# Patient Record
Sex: FEMALE | Race: WHITE | Employment: FULL TIME | URBAN - NONMETROPOLITAN AREA
[De-identification: names, ages, dates, MRNs, and addresses within clinical notes are randomized per-mention and may not be internally consistent; named-entity substitution may affect disease eponyms.]

---

## 2017-01-04 ENCOUNTER — OFFICE VISIT (OUTPATIENT)
Dept: FAMILY MEDICINE | Facility: CLINIC | Age: 37
End: 2017-01-04
Payer: COMMERCIAL

## 2017-01-04 VITALS
TEMPERATURE: 98.8 F | RESPIRATION RATE: 18 BRPM | SYSTOLIC BLOOD PRESSURE: 120 MMHG | HEIGHT: 60 IN | HEART RATE: 76 BPM | OXYGEN SATURATION: 98 % | BODY MASS INDEX: 39.19 KG/M2 | WEIGHT: 199.6 LBS | DIASTOLIC BLOOD PRESSURE: 70 MMHG

## 2017-01-04 DIAGNOSIS — N89.8 VAGINAL DISCHARGE: ICD-10-CM

## 2017-01-04 DIAGNOSIS — J34.89 NOSE IRRITATION: Primary | ICD-10-CM

## 2017-01-04 LAB
MICRO REPORT STATUS: NORMAL
SPECIMEN SOURCE: NORMAL
WET PREP SPEC: NORMAL

## 2017-01-04 PROCEDURE — 99213 OFFICE O/P EST LOW 20 MIN: CPT | Performed by: NURSE PRACTITIONER

## 2017-01-04 PROCEDURE — 87210 SMEAR WET MOUNT SALINE/INK: CPT | Performed by: NURSE PRACTITIONER

## 2017-01-04 ASSESSMENT — PAIN SCALES - GENERAL: PAINLEVEL: MILD PAIN (2)

## 2017-01-04 NOTE — MR AVS SNAPSHOT
After Visit Summary   1/4/2017    Sebastian Schuster    MRN: [id number]           Patient Information     Date Of Birth          1980        Visit Information        Provider Department      1/4/2017 3:20 PM Kaela Gerardo APRN CNP [facility]        Today's Diagnoses     Nose irritation    -  1     Vaginal discharge           Care Instructions    Increase humidity in your home. Recommend using a humidifier in your bedroom.     Begin using saline nasal spray, which you can buy over the counter, 2 squirts into each nostril 2-4 times a day.    If you don't begin to feel better by the end of the week or if you begin to cough up discolored phlegm or nasal discharge, please call us back.         Follow-ups after your visit        Who to contact     If you have questions or need follow up information about today's clinic visit or your schedule please contact [facility] directly at [phone].  Normal or non-critical lab and imaging results will be communicated to you by WooWhohart, letter or phone within 4 business days after the clinic has received the results. If you do not hear from us within 7 days, please contact the clinic through WooWhohart or phone. If you have a critical or abnormal lab result, we will notify you by phone as soon as possible.  Submit refill requests through BlaBlaCar or call your pharmacy and they will forward the refill request to us. Please allow 3 business days for your refill to be completed.          Additional Information About Your Visit        MyChart Information     BlaBlaCar gives you secure access to your electronic health record. If you see a primary care provider, you can also send messages to your care team and make appointments. If you have questions, please call your primary care clinic.  If you do not have a primary care provider, please call [phone] and they will assist you.        Care EveryWhere ID     This is your Care EveryWhere  "ID. This could be used by other organizations to access your [facility] medical records  [id number]        Your Vitals Were     Pulse Temperature Respirations    76 98.8  F (37.1  C) (Tympanic) 18    Height BMI (Body Mass Index) Pulse Oximetry    5' 0.25\" (1.53 m) 38.68 kg/m2 98%    Last Period Breastfeeding?       12/04/2016 No        Blood Pressure from Last 3 Encounters:   01/04/17 120/70   11/19/15 112/68   10/22/15 114/82    Weight from Last 3 Encounters:   01/04/17 199 lb 9.6 oz (90.538 kg)   11/19/15 192 lb (87.091 kg)   10/22/15 188 lb 12.8 oz (85.639 kg)              We Performed the Following     Wet prep          Today's Medication Changes          These changes are accurate as of: 1/4/17  4:09 PM.  If you have any questions, ask your nurse or doctor.               Stop taking these medicines if you haven't already. Please contact your care team if you have questions.     CLARITIN 10 MG tablet   Generic drug:  loratadine   Stopped by:  Kaela Gerardo APRN CNP           ibuprofen 200 MG tablet   Commonly known as:  ADVIL/MOTRIN   Stopped by:  Kaela Gerardo APRN CNP           metroNIDAZOLE 500 MG tablet   Commonly known as:  FLAGYL   Stopped by:  Kaela Gerardo APRN CNP                    Primary Care Provider Office Phone # Fax #    Karyna Kaiser BOB [place] [phone]       [address] [address]        Thank you!     Thank you for choosing [facility]  for your care. Our goal is always to provide you with excellent care. Hearing back from our patients is one way we can continue to improve our services. Please take a few minutes to complete the written survey that you may receive in the mail after your visit with us. Thank you!             Your Updated Medication List - Protect others around you: Learn how to safely use, store and throw away your medicines at www.disposemymeds.org.          This list is " accurate as of: 1/4/17  4:09 PM.  Always use your most recent med list.                   Brand Name Dispense Instructions for use    desogestrel-ethinyl estradiol 0.15-30 MG-MCG per tablet    APRI    84 tablet    Take 1 tablet by mouth daily

## 2017-01-04 NOTE — PROGRESS NOTES
SUBJECTIVE:                                                    Sebastian Schuster is a 36 year old female who presents to clinic today for the following health issues:      ENT SYMPTOMS      Duration: 2 weeks    Description  nasal congestion, sore throat and right nostril is bleeding (noticed last night)    Severity: mild    Accompanying signs and symptoms: None    History (predisposing factors):  none    Precipitating or alleviating factors: None    Therapies tried and outcome:  Sinus and cold otc, only helped temporary    -stopped taking last week   - Throat is scratchy, raw and feels irritated.   - Lots of nasal discharge. Typically clear watery drainage. Has had occasional thick, discharge and noted bloody mucous yesterday.  - No cough, no sob, no pain, pressure with inspir.   - Has felt well enough to work.  - Has not been around others who are sick. Son had cold but resolved.      Vaginal  SYMPTOMS    Duration: 2-3 days ago  Description  vaginal discharge, clear. Increased about a week ago. Period is due shortly. On OCP, sexually active, one partner  Intensity:  mild  Accompanying signs and symptoms:  Fever/chills: no   Flank pain no   Nausea and vomiting: no   Vaginal symptoms: discharge  Abdominal/Pelvic Pain: no   History  History of frequent UTI's: no   History of kidney stones: no   Sexually Active: YES  Possibility of pregnancy: No  Precipitating or alleviating factors: None  Therapies tried and outcome: none   Outcome: 0          Problem list and histories reviewed & adjusted, as indicated.  Additional history: as documented    Problem list, Medication list, Allergies, and Medical/Social/Surgical histories reviewed in [facility] and updated as appropriate.    ROS:  Constitutional, HEENT, cardiovascular, pulmonary, gi and gu systems are negative, except as otherwise noted.    OBJECTIVE:                                                    /70 mmHg  Pulse 76  Temp(Src) 98.8  F (37.1  C) (Tympanic)  Resp 18   "Ht 5' 0.25\" (1.53 m)  Wt 199 lb 9.6 oz (90.538 kg)  BMI 38.68 kg/m2  SpO2 98%  LMP 12/04/2016  Breastfeeding? No  Body mass index is 38.68 kg/(m^2).  GENERAL: healthy, alert and no distress  EYES: Eyes grossly normal to inspection and conjunctivae and sclerae normal  HENT: bilateral nares are mildly reddened, right nare has 3 small irritated areas which are likely source of bleeding. normal cephalic/atraumatic, ear canals and TM's normal, nose and mouth without ulcers or lesions, rhinorrhea clear, oropharynx clear, oral mucous membranes moist   NECK: no adenopathy, no asymmetry, masses, or scars and thyroid normal to palpation  RESP: lungs clear to auscultation - no rales, rhonchi or wheezes  CV: regular rate and rhythm, normal S1 S2, no S3 or S4, no murmur, click or rub, no peripheral edema and peripheral pulses strong  MS: no gross musculoskeletal defects noted, no edema  PSYCH: mentation appears normal, affect normal/bright    Diagnostic Test Results:  none      ASSESSMENT/PLAN:                                                        1. Nose irritation  Dry nasal mucosa, irritated area in right nares bleeding. No sign of infection.  - Saline nasal spray 2-4 times daily  - Humidify home/ bedroom    2. Vaginal discharge  Description  Consistent with  Normal vaginal discharge as no odor, color or unusual texture/consistency  - Wet prep    Patient Instructions   Increase humidity in your home. Recommend using a humidifier in your bedroom.     Begin using saline nasal spray, which you can buy over the counter, 2 squirts into each nostril 2-4 times a day.    If you don't begin to feel better by the end of the week or if you begin to cough up discolored phlegm or nasal discharge, please call us back.         Kaela Gerardo, NP, APRN [facility]    "

## 2017-01-04 NOTE — PATIENT INSTRUCTIONS
Increase humidity in your home. Recommend using a humidifier in your bedroom.     Begin using saline nasal spray, which you can buy over the counter, 2 squirts into each nostril 2-4 times a day.    If you don't begin to feel better by the end of the week or if you begin to cough up discolored phlegm or nasal discharge, please call us back.

## 2017-01-04 NOTE — NURSING NOTE
"Chief Complaint   Patient presents with     UTI     Throat Problem       Initial /70 mmHg  Pulse 76  Temp(Src) 98.8  F (37.1  C) (Tympanic)  Resp 18  Ht 5' 0.25\" (1.53 m)  Wt 199 lb 9.6 oz (90.538 kg)  BMI 38.68 kg/m2  SpO2 98%  LMP 12/04/2016  Breastfeeding? No Estimated body mass index is 38.68 kg/(m^2) as calculated from the following:    Height as of this encounter: 5' 0.25\" (1.53 m).    Weight as of this encounter: 199 lb 9.6 oz (90.538 kg).  BP completed using cuff size: regular  "

## 2017-01-26 DIAGNOSIS — N92.1 METRORRHAGIA: Primary | ICD-10-CM

## 2017-01-26 RX ORDER — DESOGESTREL AND ETHINYL ESTRADIOL 0.15-0.03
1 KIT ORAL DAILY
Qty: 84 TABLET | Refills: 1 | Status: SHIPPED | OUTPATIENT
Start: 2017-01-26 | End: 2017-07-21

## 2017-01-26 NOTE — TELEPHONE ENCOUNTER
APRI      Last Written Prescription Date: 8/17/16  Last Fill Quantity: 84, # refills: 1  Last Office Visit with G, [facility] or [facility] prescribing provider: 1/4/17       BP Readings from Last 3 Encounters:   01/04/17 120/70   11/19/15 112/68   10/22/15 114/82     Date of last Breast Exam: 8/26/15

## 2017-02-28 ENCOUNTER — OFFICE VISIT (OUTPATIENT)
Dept: FAMILY MEDICINE | Facility: CLINIC | Age: 37
End: 2017-02-28
Payer: COMMERCIAL

## 2017-02-28 VITALS
WEIGHT: 203 LBS | SYSTOLIC BLOOD PRESSURE: 120 MMHG | DIASTOLIC BLOOD PRESSURE: 80 MMHG | HEART RATE: 79 BPM | OXYGEN SATURATION: 97 % | TEMPERATURE: 98.6 F | RESPIRATION RATE: 20 BRPM | BODY MASS INDEX: 39.32 KG/M2

## 2017-02-28 DIAGNOSIS — H65.02 ACUTE SEROUS OTITIS MEDIA OF LEFT EAR, RECURRENCE NOT SPECIFIED: Primary | ICD-10-CM

## 2017-02-28 PROCEDURE — 99213 OFFICE O/P EST LOW 20 MIN: CPT | Performed by: NURSE PRACTITIONER

## 2017-02-28 RX ORDER — FLUTICASONE PROPIONATE 50 MCG
1-2 SPRAY, SUSPENSION (ML) NASAL DAILY
Qty: 16 G | Refills: 0 | Status: SHIPPED | OUTPATIENT
Start: 2017-02-28 | End: 2019-03-14

## 2017-02-28 NOTE — NURSING NOTE
"Chief Complaint   Patient presents with     Otalgia       Initial /80  Pulse 79  Temp 98.6  F (37  C) (Tympanic)  Resp 20  Wt 203 lb (92.1 kg)  SpO2 97%  BMI 39.32 kg/m2 Estimated body mass index is 39.32 kg/(m^2) as calculated from the following:    Height as of 1/4/17: 5' 0.25\" (1.53 m).    Weight as of this encounter: 203 lb (92.1 kg).  Medication Reconciliation: complete    Health Maintenance that is potentially due pending provider review:  NONE    "

## 2017-02-28 NOTE — MR AVS SNAPSHOT
After Visit Summary   2/28/2017    Sebastian Schuster    MRN: [id number]           Patient Information     Date Of Birth          1980        Visit Information        Provider Department      2/28/2017 1:20 PM Cherelle Aaron APRN CNP [facility]        Today's Diagnoses     Acute serous otitis media of left ear, recurrence not specified    -  1      Care Instructions    Use medication as directed.    May use acetaminophen, ibuprofen prn.  Follow up with PCP for recheck in 2 weeks, return sooner if symptoms worsen or change.    Discussed further evaluation by ENT if recurrent otitis media or treatment failure occurs.     Patient voiced understanding of instructions given.        Fluid in the Middle Ear, No Infection (Adult)  Earaches can happen without an infection. This occurs when air and fluid build up behind the eardrum causing a feeling of fullness and discomfort and reduced hearing. This is called otitis media with effusion (OME) or serous otitis media. It means there is fluid in the middle ear. It is not the same as acute otitis media, which is typically from infection.  OME can happen when you have a cold if congestion blocks the passage that drains the middle ear (eustachian tube). It may also occur with nasal allergies or after a bacterial middle ear infection.    The pain/discomfort may come and go. You may hear clicking or popping sounds when you chew or swallow. You may feel that your balance is off. Or you may hear ringing in the ear.  It often takes from several weeks up to 3 months for the fluid to clear on its own. Oral pain relievers and ear drops help if there is pain. Decongestants and antihistamines sometimes help. Antibiotics don't help since there is no infection. Your doctor may prescribe a nasal spray to help reduce swelling in the nose and eustachian tube. This can allow the ear to drain.  If it doesn't improve after 3 months, surgery may be used to drain  the fluid and insert a small tube in the eardrum to allow continued drainage.  Because the middle ear fluid can become infected, it is important to watch for signs of an ear infection which may develop later. These signs include increased ear pain, fever, or drainage from the ear.  Home care  The following guidelines will help you care for yourself at home:    You may use acetaminophen or ibuprofen to control pain, unless another medicine was prescribed. [NOTE: If you have chronic liver or kidney disease or ever had a stomach ulcer or GI bleeding, talk with your doctor before using these medicines.] (Aspirin should never be used in anyone under 18 years of age who is ill with a fever. It may cause severe liver damage.)    While not always helpful, you may use over-the-counter decongestants such as phenylephrine or pseudoephedrine. Don't use nasal spray decongestants more than 3 days. Longer use can make congestion worse. (Prescription nasal sprays from your doctor don't typically have those restrictions.)    Antihistamines may help if you are also having allergy symptoms.    You may use medicines such as guaifenesin to thin mucus and promote drainage.  Follow-up care  Follow up with your doctor or as advised if you are not feeling better after 3 days.  When to seek medical care  Get prompt medical attention if any of the following occur:    Ear pain gets worse or does not start to improve     Fever of 100.4 F (38 C) or higher, or as directed by your health care provider    Fluid or blood draining from the ear    Headache or sinus pain    Stiff neck    Unusual drowsiness or confusion    9462-1121 [employer]. [address] [place] [address], [place] [address]. All rights reserved. This information is not intended as a substitute for professional medical care. Always follow your healthcare professional's instructions.              Follow-ups after your visit        Who to contact     If you have questions or need  follow up information about today's clinic visit or your schedule please contact [facility] directly at [phone].  Normal or non-critical lab and imaging results will be communicated to you by Xamplifiedhart, letter or phone within 4 business days after the clinic has received the results. If you do not hear from us within 7 days, please contact the clinic through Xamplifiedhart or phone. If you have a critical or abnormal lab result, we will notify you by phone as soon as possible.  Submit refill requests through PodPoster or call your pharmacy and they will forward the refill request to us. Please allow 3 business days for your refill to be completed.          Additional Information About Your Visit        XamplifiedharSpotbros Information     PodPoster gives you secure access to your electronic health record. If you see a primary care provider, you can also send messages to your care team and make appointments. If you have questions, please call your primary care clinic.  If you do not have a primary care provider, please call [phone] and they will assist you.        Care EveryWhere ID     This is your Care EveryWhere ID. This could be used by other organizations to access your [facility] medical records  [id number]        Your Vitals Were     Pulse Temperature Respirations Pulse Oximetry BMI (Body Mass Index)       79 98.6  F (37  C) (Tympanic) 20 97% 39.32 kg/m2        Blood Pressure from Last 3 Encounters:   02/28/17 120/80   01/04/17 120/70   11/19/15 112/68    Weight from Last 3 Encounters:   02/28/17 203 lb (92.1 kg)   01/04/17 199 lb 9.6 oz (90.5 kg)   11/19/15 192 lb (87.1 kg)              Today, you had the following     No orders found for display         Today's Medication Changes          These changes are accurate as of: 2/28/17  2:07 PM.  If you have any questions, ask your nurse or doctor.               Start taking these medicines.        Dose/Directions    fluticasone 50 MCG/ACT spray   Commonly  known as:  FLONASE   Used for:  Acute serous otitis media of left ear, recurrence not specified        Dose:  1-2 spray   Spray 1-2 sprays into both nostrils daily   Quantity:  16 g   Refills:  0            Where to get your medicines      [place] [facility] [place] [address] [place] [address]  [address] [place] [address]     Phone:  [phone]     fluticasone 50 MCG/ACT spray                Primary Care Provider Office Phone # Fax #    Karyna Awildadavid [place], [facility] [phone]       [address] [address]        Thank you!     Thank you for choosing [facility]  for your care. Our goal is always to provide you with excellent care. Hearing back from our patients is one way we can continue to improve our services. Please take a few minutes to complete the written survey that you may receive in the mail after your visit with us. Thank you!             Your Updated Medication List - Protect others around you: Learn how to safely use, store and throw away your medicines at www.disposemymeds.org.          This list is accurate as of: 2/28/17  2:07 PM.  Always use your most recent med list.                   Brand Name Dispense Instructions for use    desogestrel-ethinyl estradiol 0.15-30 MG-MCG per tablet    APRI    84 tablet    Take 1 tablet by mouth daily       fluticasone 50 MCG/ACT spray    FLONASE    16 g    Spray 1-2 sprays into both nostrils daily

## 2017-02-28 NOTE — PROGRESS NOTES
SUBJECTIVE:                                                    Sebastian Schuster is a 36 year old female who presents to clinic today for the following health issues:      ENT Symptoms             Symptoms: cc Present Absent Comment   Fever/Chills   x    Fatigue   x    Muscle Aches   x    Eye Irritation   x    Sneezing   x    Nasal Ervin/Drg   x    Sinus Pressure/Pain   x    Loss of smell   x    Dental pain   x    Sore Throat   x    Swollen Glands   x    Ear Pain/Fullness  x  L ear   Cough   x    Wheeze   x    Chest Pain   x    Shortness of breath   x    Rash   x    Other   x      Symptom duration:  couple of days   Symptom severity:  mild   Treatments tried:  none   Contacts:       History of recurrent otitis: no    Past Medical History   Diagnosis Date     Metrorrhagia 4/25/2013       Social History   Substance Use Topics     Smoking status: Never Smoker     Smokeless tobacco: Never Used     Alcohol use No      Comment: rare       REVIEW OF SYSTEMS  ROS:   CONSTITUTIONAL:NEGATIVE for fever, chills, change in weight  INTEGUMENTARY/SKIN: NEGATIVE for worrisome rashes, moles or lesions  EYES: NEGATIVE for vision changes or irritation  ENT/MOUTH: See above  RESP:NEGATIVE for significant cough or SOB  CV: NEGATIVE for chest pain, palpitations or peripheral edema  GI: NEGATIVE for nausea, abdominal pain, heartburn, or change in bowel habits  MUSCULOSKELETAL: NEGATIVE for significant arthralgias or myalgia  NEURO: NEGATIVE for weakness, dizziness or paresthesias        OBJECTIVE:  /80  Pulse 79  Temp 98.6  F (37  C) (Tympanic)  Resp 20  Wt 203 lb (92.1 kg)  SpO2 97%  BMI 39.32 kg/m2   The right TM is normal: no effusions, no erythema, and normal landmarks     The right auditory canal is normal and without drainage, edema or erythema  The left TM is normal: no effusions, no erythema, and normal landmarks and bubbles  The left auditory canal is normal and without drainage, edema or erythema  Oropharynx exam is normal: no  lesions, erythema, adenopathy or exudate.  GENERAL: no acute distress  EYES: EOMI,  PERRL, conjunctiva clear  NECK: supple, non-tender to palpation, no adenopathy noted  RESP: lungs clear to auscultation - no rales, rhonchi or wheezes  SKIN: no suspicious lesions or rashes   CV: regular rates and rhythm, normal    ASSESSMENT:    ICD-10-CM    1. Acute serous otitis media of left ear, recurrence not specified H65.02 fluticasone (FLONASE) 50 MCG/ACT spray         PLAN:  Patient Instructions   Use medication as directed.    May use acetaminophen, ibuprofen prn.  Follow up with PCP for recheck in 2 weeks, return sooner if symptoms worsen or change.    Discussed further evaluation by ENT if recurrent otitis media or treatment failure occurs.     Patient voiced understanding of instructions given.        Fluid in the Middle Ear, No Infection (Adult)  Earaches can happen without an infection. This occurs when air and fluid build up behind the eardrum causing a feeling of fullness and discomfort and reduced hearing. This is called otitis media with effusion (OME) or serous otitis media. It means there is fluid in the middle ear. It is not the same as acute otitis media, which is typically from infection.  OME can happen when you have a cold if congestion blocks the passage that drains the middle ear (eustachian tube). It may also occur with nasal allergies or after a bacterial middle ear infection.    The pain/discomfort may come and go. You may hear clicking or popping sounds when you chew or swallow. You may feel that your balance is off. Or you may hear ringing in the ear.  It often takes from several weeks up to 3 months for the fluid to clear on its own. Oral pain relievers and ear drops help if there is pain. Decongestants and antihistamines sometimes help. Antibiotics don't help since there is no infection. Your doctor may prescribe a nasal spray to help reduce swelling in the nose and eustachian tube. This can allow  the ear to drain.  If it doesn't improve after 3 months, surgery may be used to drain the fluid and insert a small tube in the eardrum to allow continued drainage.  Because the middle ear fluid can become infected, it is important to watch for signs of an ear infection which may develop later. These signs include increased ear pain, fever, or drainage from the ear.  Home care  The following guidelines will help you care for yourself at home:    You may use acetaminophen or ibuprofen to control pain, unless another medicine was prescribed. [NOTE: If you have chronic liver or kidney disease or ever had a stomach ulcer or GI bleeding, talk with your doctor before using these medicines.] (Aspirin should never be used in anyone under 18 years of age who is ill with a fever. It may cause severe liver damage.)    While not always helpful, you may use over-the-counter decongestants such as phenylephrine or pseudoephedrine. Don't use nasal spray decongestants more than 3 days. Longer use can make congestion worse. (Prescription nasal sprays from your doctor don't typically have those restrictions.)    Antihistamines may help if you are also having allergy symptoms.    You may use medicines such as guaifenesin to thin mucus and promote drainage.  Follow-up care  Follow up with your doctor or as advised if you are not feeling better after 3 days.  When to seek medical care  Get prompt medical attention if any of the following occur:    Ear pain gets worse or does not start to improve     Fever of 100.4 F (38 C) or higher, or as directed by your health care provider    Fluid or blood draining from the ear    Headache or sinus pain    Stiff neck    Unusual drowsiness or confusion    [id number] [employer]. [address] [place] [address], [place] [address]. All rights reserved. This information is not intended as a substitute for professional medical care. Always follow your healthcare professional's  instructions.                JOSE MANUEL Cleaning CNP

## 2017-02-28 NOTE — PATIENT INSTRUCTIONS
Use medication as directed.    May use acetaminophen, ibuprofen prn.  Follow up with PCP for recheck in 2 weeks, return sooner if symptoms worsen or change.    Discussed further evaluation by ENT if recurrent otitis media or treatment failure occurs.     Patient voiced understanding of instructions given.        Fluid in the Middle Ear, No Infection (Adult)  Earaches can happen without an infection. This occurs when air and fluid build up behind the eardrum causing a feeling of fullness and discomfort and reduced hearing. This is called otitis media with effusion (OME) or serous otitis media. It means there is fluid in the middle ear. It is not the same as acute otitis media, which is typically from infection.  OME can happen when you have a cold if congestion blocks the passage that drains the middle ear (eustachian tube). It may also occur with nasal allergies or after a bacterial middle ear infection.    The pain/discomfort may come and go. You may hear clicking or popping sounds when you chew or swallow. You may feel that your balance is off. Or you may hear ringing in the ear.  It often takes from several weeks up to 3 months for the fluid to clear on its own. Oral pain relievers and ear drops help if there is pain. Decongestants and antihistamines sometimes help. Antibiotics don't help since there is no infection. Your doctor may prescribe a nasal spray to help reduce swelling in the nose and eustachian tube. This can allow the ear to drain.  If it doesn't improve after 3 months, surgery may be used to drain the fluid and insert a small tube in the eardrum to allow continued drainage.  Because the middle ear fluid can become infected, it is important to watch for signs of an ear infection which may develop later. These signs include increased ear pain, fever, or drainage from the ear.  Home care  The following guidelines will help you care for yourself at home:    You may use acetaminophen or ibuprofen to  control pain, unless another medicine was prescribed. [NOTE: If you have chronic liver or kidney disease or ever had a stomach ulcer or GI bleeding, talk with your doctor before using these medicines.] (Aspirin should never be used in anyone under 18 years of age who is ill with a fever. It may cause severe liver damage.)    While not always helpful, you may use over-the-counter decongestants such as phenylephrine or pseudoephedrine. Don't use nasal spray decongestants more than 3 days. Longer use can make congestion worse. (Prescription nasal sprays from your doctor don't typically have those restrictions.)    Antihistamines may help if you are also having allergy symptoms.    You may use medicines such as guaifenesin to thin mucus and promote drainage.  Follow-up care  Follow up with your doctor or as advised if you are not feeling better after 3 days.  When to seek medical care  Get prompt medical attention if any of the following occur:    Ear pain gets worse or does not start to improve     Fever of 100.4 F (38 C) or higher, or as directed by your health care provider    Fluid or blood draining from the ear    Headache or sinus pain    Stiff neck    Unusual drowsiness or confusion    6604-9881 [employer]. [address] [place] [address], [place] [address]. All rights reserved. This information is not intended as a substitute for professional medical care. Always follow your healthcare professional's instructions.

## 2017-03-09 ENCOUNTER — OFFICE VISIT (OUTPATIENT)
Dept: FAMILY MEDICINE | Facility: CLINIC | Age: 37
End: 2017-03-09
Payer: COMMERCIAL

## 2017-03-09 VITALS
DIASTOLIC BLOOD PRESSURE: 83 MMHG | BODY MASS INDEX: 38.74 KG/M2 | TEMPERATURE: 98.2 F | WEIGHT: 200 LBS | HEART RATE: 70 BPM | SYSTOLIC BLOOD PRESSURE: 121 MMHG

## 2017-03-09 DIAGNOSIS — F41.0 ANXIETY ATTACK: Primary | ICD-10-CM

## 2017-03-09 DIAGNOSIS — R07.89 CHEST PRESSURE: ICD-10-CM

## 2017-03-09 DIAGNOSIS — R63.5 UNEXPLAINED WEIGHT GAIN: ICD-10-CM

## 2017-03-09 LAB — TSH SERPL DL<=0.005 MIU/L-ACNC: 2.28 MU/L (ref 0.4–4)

## 2017-03-09 PROCEDURE — 84443 ASSAY THYROID STIM HORMONE: CPT | Performed by: NURSE PRACTITIONER

## 2017-03-09 PROCEDURE — 99213 OFFICE O/P EST LOW 20 MIN: CPT | Performed by: NURSE PRACTITIONER

## 2017-03-09 PROCEDURE — 93000 ELECTROCARDIOGRAM COMPLETE: CPT | Performed by: NURSE PRACTITIONER

## 2017-03-09 PROCEDURE — 36415 COLL VENOUS BLD VENIPUNCTURE: CPT | Performed by: NURSE PRACTITIONER

## 2017-03-09 RX ORDER — HYDROXYZINE HYDROCHLORIDE 25 MG/1
25 TABLET, FILM COATED ORAL EVERY 6 HOURS PRN
Qty: 40 TABLET | Refills: 0 | Status: SHIPPED | OUTPATIENT
Start: 2017-03-09 | End: 2019-03-14

## 2017-03-09 NOTE — PROGRESS NOTES
SUBJECTIVE:                                                    Sebastian Schuster is a 36 year old female who presents to clinic today for the following health issues:      Abnormal Mood Symptoms     Onset: started this moring     Description:   Depression: YES  Anxiety: YES    Accompanying Signs & Symptoms:  Still participating in activities that you used to enjoy: no  Fatigue: no   Irritability: no   Difficulty concentrating: no  Changes in appetite: no  Problems with sleep: no   Heart racing/beating fast : no  Thoughts of hurting yourself or others: none     History:   Recent stress: YES winter driving   Prior depression hospitalization: None  Family history of depression: no  Family history of anxiety: no      Precipitating factors:   Alcohol/drug use: no    Alleviating factors:  none       Therapies Tried and outcome: None    Has happened a little in the past but nothing like this. Felt like she could not breath and had heaviness in her chest and then it went away. Had a jittery feeling that woke her up about 5:30 this morning. No chest pressure until she got to work. No radiation of pain. No diaphoresis or nausea. No family hx of cardiac disease under age 50. No current chest pain. Feels better.      Problem list and histories reviewed & adjusted, as indicated.  Additional history: as documented    Patient Active Problem List   Diagnosis     Contraceptive management     CARDIOVASCULAR SCREENING; LDL GOAL LESS THAN 160     Adjustment disorder with disturbance of emotion     Obesity (BMI 35.0-39.9 without comorbidity) (H)     Past Surgical History   Procedure Laterality Date     C/section, low transverse  3-     , Low Transverse     C/section, low transverse       Gallbladder surgery       Colonoscopy  2013     Procedure: COLONOSCOPY;  Colonoscopy  ;  Surgeon: Jorge Underwood MD;  Location: [facility]       Social History   Substance Use Topics     Smoking status: Never Smoker      Smokeless tobacco: Never Used     Alcohol use No      Comment: rare     Family History   Problem Relation Age of Onset     CANCER Maternal Grandmother      Circulatory Mother 43     carotid and leg stents         Current Outpatient Prescriptions   Medication Sig Dispense Refill     hydrOXYzine (ATARAX) 25 MG tablet Take 1 tablet (25 mg) by mouth every 6 hours as needed for anxiety 40 tablet 0     fluticasone (FLONASE) 50 MCG/ACT spray Spray 1-2 sprays into both nostrils daily 16 g 0     desogestrel-ethinyl estradiol (APRI) 0.15-30 MG-MCG per tablet Take 1 tablet by mouth daily 84 tablet 1     Allergies   Allergen Reactions     Augmentin Rash     Rash      Penicillins      See augmentin reaction      Lavender Oil Rash     Labs reviewed in EPIC    Reviewed and updated as needed this visit by clinical staff  Allergies  Meds  Problems       Reviewed and updated as needed this visit by Provider  Allergies  Meds  Problems         ROS:  Constitutional, HEENT, cardiovascular, pulmonary, GI, , musculoskeletal, neuro, skin, endocrine and psych systems are negative, except as otherwise noted.    OBJECTIVE:                                                    /83  Pulse 70  Temp 98.2  F (36.8  C) (Tympanic)  Wt 200 lb (90.7 kg)  BMI 38.74 kg/m2  Body mass index is 38.74 kg/(m^2).  GENERAL: healthy, alert and no distress, nontoxic in appearrance   EYES: Eyes grossly normal to inspection, PERRL and conjunctivae and sclerae normal  HENT: ear canals and TM's normal, nose and mouth without ulcers or lesions  NECK: no adenopathy, no asymmetry, masses, or scars and thyroid normal to palpation  RESP: lungs clear to auscultation - no rales, rhonchi or wheezes  CV: regular rate and rhythm, normal S1 S2, no S3 or S4, no murmur, click or rub, no peripheral edema   ABDOMEN: soft, nontender, no hepatosplenomegaly, no masses and bowel sounds normal  MS: no gross musculoskeletal defects noted, no edema  No rash    Diagnostic Test  Results:  No results found for this or any previous visit (from the past 24 hour(s)).     ASSESSMENT/PLAN:                                                      Problem List Items Addressed This Visit     None      Visit Diagnoses     Anxiety attack    -  Primary    Relevant Medications    hydrOXYzine (ATARAX) 25 MG tablet    Other Relevant Orders    MENTAL HEALTH REFERRAL    Chest pressure        Relevant Orders    EKG 12-lead complete w/read - Clinics (Completed)    Unexplained weight gain        Relevant Orders    TSH with free T4 reflex (Completed)               Patient Instructions     Take medication as directed.    Follow up with your primary care provider next week.    Set up counseling appointment to see what could be causing your anxiety.        Indications for emergent return to emergency department discussed with patient, who verbalized good understanding and agreement.  Patient understands the limitations of today's evaluation.         Treating Anxiety Disorders with Therapy  If you have an anxiety disorder, you don t have to suffer anymore. Treatment is available. Therapy (also called counseling) is often a helpful treatment for anxiety disorders. With therapy, a specially trained professional (therapist) helps you face and learn to manage your anxiety. Therapy can be short-term or long-term depending on your needs. In some cases, medication may also be prescribed with therapy. It may take time before you notice how much therapy is helping, but stick with it. With therapy, you can feel better.    Cognitive behavioral therapy (CBT)  Cognitive behavioral therapy (CBT) teaches you to manage anxiety. It does this by helping you understand how you think and act when you re anxious. Research has shown CBT to be a very effective treatment for anxiety disorders. How CBT is run is almost like a class. It involves homework and activities to build skills that teach you to cope with anxiety step by step. It can be  done in a group or one-on-one, and often takes place for a set number of sessions. CBT has two main parts:    Cognitive therapy helps you identify the negative, irrational thoughts that occur with your anxiety. You ll learn to replace these with more positive, realistic thoughts.    Behavioral therapy helps you change how you react to anxiety. You ll learn coping skills and methods for relaxing to help you better deal with anxiety.  Other forms of therapy  Other therapy methods may work better for you than CBT. Or, you may move from CBT to another form of therapy as your treatment needs change. This may mean meeting with a therapist by yourself or in a group. Therapy can also help you work through problems in your life, such as drug or alcohol dependence, that may be making your anxiety worse.  Getting better takes time  Therapy will help you feel better and teach you skills to help manage anxiety long term. But change doesn t happen right away. It takes a commitment from you. And treatment only works if you learn to face the causes of your anxiety. So, you might feel worse before you feel better. This can sometimes make it hard to stick with it. But remember: Therapy is a very effective treatment. The results will be well worth it.  Helping yourself  If anxiety is wearing you down, here are some things you can do to cope:    Don t fight your feelings. Anxiety feeds itself. The more you worry about it, the worse it gets. Instead, try to identify what might have triggered your anxiety. Then try to put this threat in perspective.    Keep in mind that you can t control everything about a situation. Change what you can and let the rest take its course.    Exercise -- it s a great way to relieve tension and help your body feel relaxed.    Examine your life for stress, and try to find ways to reduce it.    Avoid caffeine and nicotine, which can make anxiety symptoms worse.    Fight the temptation to turn to alcohol or  unprescribed drugs for relief. They only make things worse in the long run.     0767-5431 [employer]. [address] [place] [address], [place] [address]. All rights reserved. This information is not intended as a substitute for professional medical care. Always follow your healthcare professional's instructions.        Treating Anxiety Disorders with Medication  An anxiety disorder can make you feel nervous or apprehensive, even without a clear reason. Certain anxiety disorders can cause intense feelings of fear or panic. You may even have physical symptoms, such as a racing heartbeat or dizziness. If you have these feelings, you don t have to suffer anymore. Treatment to help you overcome your fears will likely include therapy (also called counseling). Medication may also be prescribed to help control your symptoms.    Medications  Certain medications may be prescribed to help control your symptoms. As a result, you may feel less anxious. You may also feel able to move forward with therapy. At first, medications and dosages may need to be adjusted to find what works best for you. Try to be patient. Tell your health care provider how a medication makes you feel. This way, you can work together to find the treatment that s best for you. Keep in mind that medications can have side effects. Talk to your provider about any side effects that are bothering you. Changing the dose or type of medication may help. Don t stop taking medication on your own because it can cause symptoms to come back.    Anti-anxiety medication: This medication eases symptoms and helps you relax. Your health care provider will explain when and how to use it. It may be prescribed for use before situations that makes you anxious. Or, you may be told to take it on a regular schedule. Anti-anxiety medication may make you feel a little sleepy or  out of it.  Don t drive a car or operate machinery while on this medication, until you know how it  affects you.  Caution  Never use alcohol or other drugs with anti-anxiety medications. This could result in loss of muscular control, sedation, coma or death. Also, use only the amount of medication prescribed for you. If you think you may have taken too much, get emergency care right away.     Antidepressant medication: This kind of medication is often used to treat anxiety, even if you aren t depressed. An antidepressant helps balance out brain chemicals. This helps keep anxiety under control. This medication is taken on a schedule. It takes a few weeks to start working. If you don t notice a change at first, you may just need more time. But if you don t notice results after the first few weeks, tell your provider.  Keep taking medications as prescribed  Never change your dosage or stop taking your medications without talking to your health care provider first. Keep the following in mind:    Some medications must be taken on a schedule. Make this part of your daily routine. For instance, always take your pill before brushing your teeth. A pillbox can help you remember if you ve taken your medication each day.    Medications are often taken for 6 to 12 months. Your health care provider will then evaluate whether you need to stay on them. Many people who have also had therapy may no longer need medication to manage anxiety.    You may need to stop taking medication slowly to give your body time to adjust. When it s time to stop, your health care provider will tell you more. Remember: Never stop taking your medication without talking to your provider first.    If symptoms return, you may need to start taking medications again. This isn t your fault. It s just the nature of your anxiety disorder.  Special concerns    Side effects: Medications may cause side effects. Ask your health care provider or pharmacist what you can expect. They may have ideas for avoiding some side effects.    Sexual problems: Some  antidepressants can affect your desire for sex or your ability to have an orgasm. A change in dosage or medication often solves the problem. If you have a sexual side effect that concerns you, tell your health care provider.    Addiction: Antidepressants are not addictive. And if you ve never had a problem with drugs or alcohol, you likely won t have a problem with anti-anxiety medication. But if you have history of addiction, you may need to avoid this medication.     5531-4560 [employer]. [address] [place] [address], [place] [address]. All rights reserved. This information is not intended as a substitute for professional medical care. Always follow your healthcare professional's instructions.            JOSE MANUEL Torres [facility]

## 2017-03-09 NOTE — MR AVS SNAPSHOT
After Visit Summary   3/9/2017    Sebastian Schuster    MRN: [id number]           Patient Information     Date Of Birth          1980        Visit Information        Provider Department      3/9/2017 11:20 AM Janett Mix APRN [facility]        Today's Diagnoses     Anxiety attack    -  1    Chest pressure        Unexplained weight gain          Care Instructions    Take medication as directed.    Follow up with your primary care provider next week.    Set up counseling appointment to see what could be causing your anxiety.        Indications for emergent return to emergency department discussed with patient, who verbalized good understanding and agreement.  Patient understands the limitations of today's evaluation.         Treating Anxiety Disorders with Therapy  If you have an anxiety disorder, you don t have to suffer anymore. Treatment is available. Therapy (also called counseling) is often a helpful treatment for anxiety disorders. With therapy, a specially trained professional (therapist) helps you face and learn to manage your anxiety. Therapy can be short-term or long-term depending on your needs. In some cases, medication may also be prescribed with therapy. It may take time before you notice how much therapy is helping, but stick with it. With therapy, you can feel better.    Cognitive behavioral therapy (CBT)  Cognitive behavioral therapy (CBT) teaches you to manage anxiety. It does this by helping you understand how you think and act when you re anxious. Research has shown CBT to be a very effective treatment for anxiety disorders. How CBT is run is almost like a class. It involves homework and activities to build skills that teach you to cope with anxiety step by step. It can be done in a group or one-on-one, and often takes place for a set number of sessions. CBT has two main parts:    Cognitive therapy helps you identify the negative, irrational thoughts  that occur with your anxiety. You ll learn to replace these with more positive, realistic thoughts.    Behavioral therapy helps you change how you react to anxiety. You ll learn coping skills and methods for relaxing to help you better deal with anxiety.  Other forms of therapy  Other therapy methods may work better for you than CBT. Or, you may move from CBT to another form of therapy as your treatment needs change. This may mean meeting with a therapist by yourself or in a group. Therapy can also help you work through problems in your life, such as drug or alcohol dependence, that may be making your anxiety worse.  Getting better takes time  Therapy will help you feel better and teach you skills to help manage anxiety long term. But change doesn t happen right away. It takes a commitment from you. And treatment only works if you learn to face the causes of your anxiety. So, you might feel worse before you feel better. This can sometimes make it hard to stick with it. But remember: Therapy is a very effective treatment. The results will be well worth it.  Helping yourself  If anxiety is wearing you down, here are some things you can do to cope:    Don t fight your feelings. Anxiety feeds itself. The more you worry about it, the worse it gets. Instead, try to identify what might have triggered your anxiety. Then try to put this threat in perspective.    Keep in mind that you can t control everything about a situation. Change what you can and let the rest take its course.    Exercise -- it s a great way to relieve tension and help your body feel relaxed.    Examine your life for stress, and try to find ways to reduce it.    Avoid caffeine and nicotine, which can make anxiety symptoms worse.    Fight the temptation to turn to alcohol or unprescribed drugs for relief. They only make things worse in the long run.     2179-5738 [employer]. [address] [place] [address], [place] [address]. All rights reserved.  This information is not intended as a substitute for professional medical care. Always follow your healthcare professional's instructions.        Treating Anxiety Disorders with Medication  An anxiety disorder can make you feel nervous or apprehensive, even without a clear reason. Certain anxiety disorders can cause intense feelings of fear or panic. You may even have physical symptoms, such as a racing heartbeat or dizziness. If you have these feelings, you don t have to suffer anymore. Treatment to help you overcome your fears will likely include therapy (also called counseling). Medication may also be prescribed to help control your symptoms.    Medications  Certain medications may be prescribed to help control your symptoms. As a result, you may feel less anxious. You may also feel able to move forward with therapy. At first, medications and dosages may need to be adjusted to find what works best for you. Try to be patient. Tell your health care provider how a medication makes you feel. This way, you can work together to find the treatment that s best for you. Keep in mind that medications can have side effects. Talk to your provider about any side effects that are bothering you. Changing the dose or type of medication may help. Don t stop taking medication on your own because it can cause symptoms to come back.    Anti-anxiety medication: This medication eases symptoms and helps you relax. Your health care provider will explain when and how to use it. It may be prescribed for use before situations that makes you anxious. Or, you may be told to take it on a regular schedule. Anti-anxiety medication may make you feel a little sleepy or  out of it.  Don t drive a car or operate machinery while on this medication, until you know how it affects you.  Caution  Never use alcohol or other drugs with anti-anxiety medications. This could result in loss of muscular control, sedation, coma or death. Also, use only the  amount of medication prescribed for you. If you think you may have taken too much, get emergency care right away.     Antidepressant medication: This kind of medication is often used to treat anxiety, even if you aren t depressed. An antidepressant helps balance out brain chemicals. This helps keep anxiety under control. This medication is taken on a schedule. It takes a few weeks to start working. If you don t notice a change at first, you may just need more time. But if you don t notice results after the first few weeks, tell your provider.  Keep taking medications as prescribed  Never change your dosage or stop taking your medications without talking to your health care provider first. Keep the following in mind:    Some medications must be taken on a schedule. Make this part of your daily routine. For instance, always take your pill before brushing your teeth. A pillbox can help you remember if you ve taken your medication each day.    Medications are often taken for 6 to 12 months. Your health care provider will then evaluate whether you need to stay on them. Many people who have also had therapy may no longer need medication to manage anxiety.    You may need to stop taking medication slowly to give your body time to adjust. When it s time to stop, your health care provider will tell you more. Remember: Never stop taking your medication without talking to your provider first.    If symptoms return, you may need to start taking medications again. This isn t your fault. It s just the nature of your anxiety disorder.  Special concerns    Side effects: Medications may cause side effects. Ask your health care provider or pharmacist what you can expect. They may have ideas for avoiding some side effects.    Sexual problems: Some antidepressants can affect your desire for sex or your ability to have an orgasm. A change in dosage or medication often solves the problem. If you have a sexual side effect that concerns  you, tell your health care provider.    Addiction: Antidepressants are not addictive. And if you ve never had a problem with drugs or alcohol, you likely won t have a problem with anti-anxiety medication. But if you have history of addiction, you may need to avoid this medication.     2831-4362 [employer]. [address] [place] [address]. All rights reserved. This information is not intended as a substitute for professional medical care. Always follow your healthcare professional's instructions.              Follow-ups after your visit        Additional Services     MENTAL HEALTH REFERRAL       Your provider has referred you to: Behavioral Healthcare Providers Intake Scheduling [phone]   [email or web address]  [facility]: [facility] [phone]   [email or web address]   *Referral from [facility] Primary Care Provider required - Consultation Model - medication management & future refills will be returned to [facility] PCP upon completion of evaluation  *Please call to schedule an appointment.  [facility]: [facility] [phone]   [email or web address]   *Referral from [facility] Primary Care Provider required - Consultation Model - medication management & future refills will be returned to [facility] PCP upon completion of evaluation  *Please call to schedule an appointment.  [facility]: [facility]  [phone]   [email or web address]   *Referral from [facility] Primary Care Provider required - Consultation Model - medication management & future refills will be returned to [facility] PCP upon completion of evaluation  *Please call to schedule an  appointment.    All scheduling is subject to the client's specific insurance plan & benefits, provider/location availability, and provider clinical specialities.  Please arrive 15 minutes early for your first appointment and bring your completed paperwork.    Please be aware that coverage of these services is subject to the terms and limitations of your health insurance plan.  Call member services at your health plan with any benefit or coverage questions.                  Follow-up notes from your care team     See patient instructions section of the AVS Return in about 1 week (around 3/16/2017) for Follow up with your primary care provider.      Who to contact     If you have questions or need follow up information about today's clinic visit or your schedule please contact [facility] directly at [phone].  Normal or non-critical lab and imaging results will be communicated to you by Nuforce[employer], letter or phone within 4 business days after the clinic has received the results. If you do not hear from us within 7 days, please contact the clinic through [employer]force[employer] or phone. If you have a critical or abnormal lab result, we will notify you by phone as soon as possible.  Submit refill requests through [employer] or call your pharmacy and they will forward the refill request to us. Please allow 3 business days for your refill to be completed.          Additional Information About Your Visit        Data[employer] Information     [employer] gives you secure access to your electronic health record. If you see a primary care provider, you can also send messages to your care team and make appointments. If you have questions, please call your primary care clinic.  If you do not have a primary care provider, please call [phone] and they will assist you.        Care EveryWhere ID     This is your Care EveryWhere ID. This could be used by other organizations to access your [place] medical records  [id number]         Your Vitals Were     Pulse Temperature BMI (Body Mass Index)             70 98.2  F (36.8  C) (Tympanic) 38.74 kg/m2          Blood Pressure from Last 3 Encounters:   03/09/17 121/83   02/28/17 120/80   01/04/17 120/70    Weight from Last 3 Encounters:   03/09/17 200 lb (90.7 kg)   02/28/17 203 lb (92.1 kg)   01/04/17 199 lb 9.6 oz (90.5 kg)              We Performed the Following     EKG 12-lead complete w/read - Clinics     MENTAL HEALTH REFERRAL     TSH with free T4 reflex          Today's Medication Changes          These changes are accurate as of: 3/9/17 12:00 PM.  If you have any questions, ask your nurse or doctor.               Start taking these medicines.        Dose/Directions    hydrOXYzine 25 MG tablet   Commonly known as:  ATARAX   Used for:  Anxiety attack        Dose:  25 mg   Take 1 tablet (25 mg) by mouth every 6 hours as needed for anxiety   Quantity:  40 tablet   Refills:  0            Where to get your medicines      These medications were sent to [facility] [place] [id number]     Phone:  [phone]     hydrOXYzine 25 MG tablet                Primary Care Provider Office Phone # Fax #    Karyna Stockton [facility] [phone]       [address] [address]        Thank you!     Thank you for choosing [facility]  for your care. Our goal is always to provide you with excellent care. Hearing back from our patients is one way we can continue to improve our services. Please take a few minutes to complete the written survey that you may receive in the mail after your visit with us. Thank you!             Your Updated Medication List - Protect others around you: Learn how to safely use, store and throw away your medicines at www.disposemymeds.org.          This list is accurate as of: 3/9/17 12:00 PM.  Always use your most recent med list.                    Brand Name Dispense Instructions for use    desogestrel-ethinyl estradiol 0.15-30 MG-MCG per tablet    APRI    84 tablet    Take 1 tablet by mouth daily       fluticasone 50 MCG/ACT spray    FLONASE    16 g    Spray 1-2 sprays into both nostrils daily       hydrOXYzine 25 MG tablet    ATARAX    40 tablet    Take 1 tablet (25 mg) by mouth every 6 hours as needed for anxiety

## 2017-03-09 NOTE — PATIENT INSTRUCTIONS
Take medication as directed.    Follow up with your primary care provider next week.    Set up counseling appointment to see what could be causing your anxiety.        Indications for emergent return to emergency department discussed with patient, who verbalized good understanding and agreement.  Patient understands the limitations of today's evaluation.         Treating Anxiety Disorders with Therapy  If you have an anxiety disorder, you don t have to suffer anymore. Treatment is available. Therapy (also called counseling) is often a helpful treatment for anxiety disorders. With therapy, a specially trained professional (therapist) helps you face and learn to manage your anxiety. Therapy can be short-term or long-term depending on your needs. In some cases, medication may also be prescribed with therapy. It may take time before you notice how much therapy is helping, but stick with it. With therapy, you can feel better.    Cognitive behavioral therapy (CBT)  Cognitive behavioral therapy (CBT) teaches you to manage anxiety. It does this by helping you understand how you think and act when you re anxious. Research has shown CBT to be a very effective treatment for anxiety disorders. How CBT is run is almost like a class. It involves homework and activities to build skills that teach you to cope with anxiety step by step. It can be done in a group or one-on-one, and often takes place for a set number of sessions. CBT has two main parts:    Cognitive therapy helps you identify the negative, irrational thoughts that occur with your anxiety. You ll learn to replace these with more positive, realistic thoughts.    Behavioral therapy helps you change how you react to anxiety. You ll learn coping skills and methods for relaxing to help you better deal with anxiety.  Other forms of therapy  Other therapy methods may work better for you than CBT. Or, you may move from CBT to another form of therapy as your treatment needs  change. This may mean meeting with a therapist by yourself or in a group. Therapy can also help you work through problems in your life, such as drug or alcohol dependence, that may be making your anxiety worse.  Getting better takes time  Therapy will help you feel better and teach you skills to help manage anxiety long term. But change doesn t happen right away. It takes a commitment from you. And treatment only works if you learn to face the causes of your anxiety. So, you might feel worse before you feel better. This can sometimes make it hard to stick with it. But remember: Therapy is a very effective treatment. The results will be well worth it.  Helping yourself  If anxiety is wearing you down, here are some things you can do to cope:    Don t fight your feelings. Anxiety feeds itself. The more you worry about it, the worse it gets. Instead, try to identify what might have triggered your anxiety. Then try to put this threat in perspective.    Keep in mind that you can t control everything about a situation. Change what you can and let the rest take its course.    Exercise -- it s a great way to relieve tension and help your body feel relaxed.    Examine your life for stress, and try to find ways to reduce it.    Avoid caffeine and nicotine, which can make anxiety symptoms worse.    Fight the temptation to turn to alcohol or unprescribed drugs for relief. They only make things worse in the long run.     9616-6873 [employer]. [address] [place] [address]. All rights reserved. This information is not intended as a substitute for professional medical care. Always follow your healthcare professional's instructions.        Treating Anxiety Disorders with Medication  An anxiety disorder can make you feel nervous or apprehensive, even without a clear reason. Certain anxiety disorders can cause intense feelings of fear or panic. You may even have physical symptoms, such as a racing heartbeat  or dizziness. If you have these feelings, you don t have to suffer anymore. Treatment to help you overcome your fears will likely include therapy (also called counseling). Medication may also be prescribed to help control your symptoms.    Medications  Certain medications may be prescribed to help control your symptoms. As a result, you may feel less anxious. You may also feel able to move forward with therapy. At first, medications and dosages may need to be adjusted to find what works best for you. Try to be patient. Tell your health care provider how a medication makes you feel. This way, you can work together to find the treatment that s best for you. Keep in mind that medications can have side effects. Talk to your provider about any side effects that are bothering you. Changing the dose or type of medication may help. Don t stop taking medication on your own because it can cause symptoms to come back.    Anti-anxiety medication: This medication eases symptoms and helps you relax. Your health care provider will explain when and how to use it. It may be prescribed for use before situations that makes you anxious. Or, you may be told to take it on a regular schedule. Anti-anxiety medication may make you feel a little sleepy or  out of it.  Don t drive a car or operate machinery while on this medication, until you know how it affects you.  Caution  Never use alcohol or other drugs with anti-anxiety medications. This could result in loss of muscular control, sedation, coma or death. Also, use only the amount of medication prescribed for you. If you think you may have taken too much, get emergency care right away.     Antidepressant medication: This kind of medication is often used to treat anxiety, even if you aren t depressed. An antidepressant helps balance out brain chemicals. This helps keep anxiety under control. This medication is taken on a schedule. It takes a few weeks to start working. If you don t notice  a change at first, you may just need more time. But if you don t notice results after the first few weeks, tell your provider.  Keep taking medications as prescribed  Never change your dosage or stop taking your medications without talking to your health care provider first. Keep the following in mind:    Some medications must be taken on a schedule. Make this part of your daily routine. For instance, always take your pill before brushing your teeth. A pillbox can help you remember if you ve taken your medication each day.    Medications are often taken for 6 to 12 months. Your health care provider will then evaluate whether you need to stay on them. Many people who have also had therapy may no longer need medication to manage anxiety.    You may need to stop taking medication slowly to give your body time to adjust. When it s time to stop, your health care provider will tell you more. Remember: Never stop taking your medication without talking to your provider first.    If symptoms return, you may need to start taking medications again. This isn t your fault. It s just the nature of your anxiety disorder.  Special concerns    Side effects: Medications may cause side effects. Ask your health care provider or pharmacist what you can expect. They may have ideas for avoiding some side effects.    Sexual problems: Some antidepressants can affect your desire for sex or your ability to have an orgasm. A change in dosage or medication often solves the problem. If you have a sexual side effect that concerns you, tell your health care provider.    Addiction: Antidepressants are not addictive. And if you ve never had a problem with drugs or alcohol, you likely won t have a problem with anti-anxiety medication. But if you have history of addiction, you may need to avoid this medication.     2857-7262 [employer]. [address] [place] [address], [place] [address]. All rights reserved. This information is not intended as a  substitute for professional medical care. Always follow your healthcare professional's instructions.

## 2017-03-09 NOTE — NURSING NOTE
"Chief Complaint   Patient presents with     Anxiety     panic attack this morning        Initial /83  Pulse 70  Temp 98.2  F (36.8  C) (Tympanic)  Wt 200 lb (90.7 kg)  BMI 38.74 kg/m2 Estimated body mass index is 38.74 kg/(m^2) as calculated from the following:    Height as of 1/4/17: 5' 0.25\" (1.53 m).    Weight as of this encounter: 200 lb (90.7 kg).  Medication Reconciliation: complete    Health Maintenance that is potentially due pending provider review:  NONE    n/a    "

## 2017-04-07 ENCOUNTER — TELEPHONE (OUTPATIENT)
Dept: FAMILY MEDICINE | Facility: CLINIC | Age: 37
End: 2017-04-07

## 2017-04-07 ENCOUNTER — MYC MEDICAL ADVICE (OUTPATIENT)
Dept: FAMILY MEDICINE | Facility: CLINIC | Age: 37
End: 2017-04-07

## 2017-04-07 PROBLEM — F41.0 PANIC DISORDER WITHOUT AGORAPHOBIA: Status: ACTIVE | Noted: 2017-04-07

## 2017-04-07 ASSESSMENT — ANXIETY QUESTIONNAIRES
3. WORRYING TOO MUCH ABOUT DIFFERENT THINGS: MORE THAN HALF THE DAYS
GAD7 TOTAL SCORE: 9
7. FEELING AFRAID AS IF SOMETHING AWFUL MIGHT HAPPEN: 1 = SEVERAL DAYS
GAD7 TOTAL SCORE: 9
5. BEING SO RESTLESS THAT IT IS HARD TO SIT STILL: NOT AT ALL
1. FEELING NERVOUS, ANXIOUS, OR ON EDGE: MORE THAN HALF THE DAYS
6. BECOMING EASILY ANNOYED OR IRRITABLE: SEVERAL DAYS
2. NOT BEING ABLE TO STOP OR CONTROL WORRYING: MORE THAN HALF THE DAYS
7. FEELING AFRAID AS IF SOMETHING AWFUL MIGHT HAPPEN: SEVERAL DAYS

## 2017-04-07 ASSESSMENT — PATIENT HEALTH QUESTIONNAIRE - PHQ9: 5. POOR APPETITE OR OVEREATING: SEVERAL DAYS

## 2017-04-07 NOTE — TELEPHONE ENCOUNTER
I haven't seen this patient since 2015. She has seen Leatha Gerardo and Alex Mix. Read Alex Wilkerson's recent OV notes. She should follow-up with someone. Thanks. DAVIDE Maxwell

## 2017-04-07 NOTE — TELEPHONE ENCOUNTER
Panel Management Review      Patient has the following on her problem list: None   This pt came on the CV suite weekly scorecard as needing Anxiety added to the problem list. This was added per dx 3/2017  Due for Gad7    Composite cancer screening  Chart review shows that this patient is due/due soon for the following None  Summary:    Patient is due/failing the following:   gad7    Action needed:   gad7    Type of outreach:    Sent [employer] message.    Questions for provider review:    None                                                                                                                                    Dai AMBROSE CMA       Chart routed to Care Team .

## 2017-04-08 ASSESSMENT — ANXIETY QUESTIONNAIRES: GAD7 TOTAL SCORE: 9

## 2017-04-11 ENCOUNTER — OFFICE VISIT (OUTPATIENT)
Dept: FAMILY MEDICINE | Facility: CLINIC | Age: 37
End: 2017-04-11
Payer: COMMERCIAL

## 2017-04-11 VITALS
RESPIRATION RATE: 20 BRPM | HEART RATE: 80 BPM | WEIGHT: 203 LBS | TEMPERATURE: 99 F | DIASTOLIC BLOOD PRESSURE: 72 MMHG | BODY MASS INDEX: 39.85 KG/M2 | SYSTOLIC BLOOD PRESSURE: 126 MMHG | HEIGHT: 60 IN

## 2017-04-11 DIAGNOSIS — N89.8 VAGINAL DISCHARGE: Primary | ICD-10-CM

## 2017-04-11 DIAGNOSIS — B36.0 TINEA VERSICOLOR: ICD-10-CM

## 2017-04-11 DIAGNOSIS — B37.31 YEAST INFECTION OF THE VAGINA: ICD-10-CM

## 2017-04-11 DIAGNOSIS — F41.0 PANIC DISORDER WITHOUT AGORAPHOBIA: ICD-10-CM

## 2017-04-11 LAB
MICRO REPORT STATUS: ABNORMAL
SPECIMEN SOURCE: ABNORMAL
WET PREP SPEC: ABNORMAL

## 2017-04-11 PROCEDURE — 87210 SMEAR WET MOUNT SALINE/INK: CPT | Performed by: NURSE PRACTITIONER

## 2017-04-11 PROCEDURE — 99214 OFFICE O/P EST MOD 30 MIN: CPT | Performed by: NURSE PRACTITIONER

## 2017-04-11 RX ORDER — FLUCONAZOLE 150 MG/1
150 TABLET ORAL ONCE
Qty: 2 TABLET | Refills: 0 | Status: SHIPPED | OUTPATIENT
Start: 2017-04-11 | End: 2017-04-11

## 2017-04-11 RX ORDER — KETOCONAZOLE 20 MG/ML
SHAMPOO TOPICAL
Qty: 120 ML | Refills: 1 | Status: SHIPPED | OUTPATIENT
Start: 2017-04-11 | End: 2019-03-14

## 2017-04-11 NOTE — PATIENT INSTRUCTIONS
Results for orders placed or performed in visit on 04/11/17   Wet prep   Result Value Ref Range    Specimen Description Vagina     Wet Prep (A)      No Trichomonas seen  No clue cells seen  Yeast seen      Micro Report Status FINAL 04/11/2017      Kefir 1/4 cup daily for preventative    Tinea Versicolor  This is a rash caused by a fungus in the top layers of the skin. This fungus is normally present in the pores of the skin and causes no symptoms, but when the fungus overgrows it causes a rash. The fungus grows more easily in hot climates and on oily or sweaty skin. It is unknown why some people get this rash and others do not, or why the rash would suddenly appear in someone who has never had it before.  The rash consists of irregular, pale, or tan spots and patches. The rash is usually on the neck, upper back, chest, and shoulders. There may be mild itching, especially if you become overheated, but it doesn't cause other symptoms. Because these spots don't change color with sun exposure like normal skin, the rash may appear as lighter or darker than normal skin.  Since the rash is harmless and usually causes no symptoms, the only reason for treatment is to improve appearance. Follow the advice below to clear the rash. However, it will take several months for normal skin color to return.  Home care  The following guidelines will help you care for yourself at home:    Apply medicated dandruff shampoo (no prescription required) to the rash at night. Then, rinse off in the morning using a bath brush or buffing sponge to scrub the skin and remove top layers of dead skin which contain the fungus. Do this every day for 4 weeks.    As an alternate treatment, you may buy an antifungal cream (miconazole or clotrimazole, which are available without a prescription) and apply this twice a day for 7 days. You should still use a bath brush or buffing sponge to scrub the skin once a day in the shower.    This rash is not  contagious to others. So there is no need to avoid exposure of others at school, , or work.  Prevention  This fungus can recur after treatment. To prevent return of the rash, once a month for the next year, apply medicated dandruff shampoo to the areas where the rash once appeared. Rinse off in the morning and scrub the skin as described above. This is especially important to do in the summer time when the rash is most likely to recur.  Other prevention tips include:    Avoid oily skin products.    Wear loose clothing.    Use sunscreen and protect yourself from sunlight.    Avoid tanning beds.  Follow-up care  Follow up with your doctor or as advised by our staff if the rash fails to improve with the above treatment, or if new symptoms appear.  When to seek medical care  Get prompt medical attention if any of the following occur:    Increasing redness of the rash    Change in appearance of the rash    Fever of 100.4 F (38 C) or higher, or as directed by your health care provider    5781-7685 [employer]. [address] [place] [address]. All rights reserved. This information is not intended as a substitute for professional medical care. Always follow your healthcare professional's instructions.        Vaginal Infection: Yeast (Candidiasis)  Yeast infection occurs when yeast in the vagina increase and start attacking the vaginal tissues. Yeast is a type of fungus. These infections are often caused by a type of yeast called Candida albicans. Other species of yeast can also cause infections. Factors that may make infection more likely include recent antibiotic use, douching, or increased sex. Yeast infections are more common in women who have diabetes, or are obese or pregnant, or have a weak immune system.  Symptoms of yeast infection    Clumpy or thin, white discharge, which may look like cottage cheese    No odor or minimal odor    Severe vaginal itching or burning    Burning with  "urination    Swelling, redness of vulva    Pain during sex  Treating yeast infection  Yeast infection is treated with a vaginal antifungal cream. In some cases, antifungal pills are prescribed instead. During treatment:    Finish all of your medicine, even if your symptoms go away.    Apply the cream before going to bed. Lie flat after applying so that it doesn't drip out.    Do not douche or use tampons.    Don't rely on a diaphragm or condoms, since the cream may weaken them.    Avoid intercourse if advised by your healthcare provider.     Should I treat a yeast infection myself?  Discuss with your healthcare provider whether you should use over-the-counter medicines to treat a yeast infection. Self-treatment may depend on whether:    You've had a yeast infection in the past.    You're at risk for STDs.  Call your healthcare provider if symptoms do not go away or come back after treatment.     3145-6642 [employer]. [address] [place] [address]. All rights reserved. This information is not intended as a substitute for professional medical care. Always follow your healthcare professional's instructions.    Phone apps: mindful meditation    Mental Health Resources:   24hr Crisis Intervention   2-531-439-9980   [facility] (24 hour/7 days counselors) Text \"life\" to 19569   National [place] on Mental Illness            640.373.6810 or 466-007-7262.   [place] Association for Children's Mental Health         658.803.8043.   Alcoholics, Alanon, Narcotics Anonymous           219.578.8065  Suicide Awareness Voices of Education (SAVE)         4- 590-141-SAVE (4294)  National Suicide Prevention Line (www.mentalhealthmn.org)        632-200-CFUA (6545)  Mental Health Consumer/Survivor Network of [place]         119.587.3170 or 695-525-4952  M[place] Association of [place]            826.117.6882 or 428-628-6318  Mental Health referral options    1. [facility]     [phone]  Direct number but is a " part of [facility]/DEC - two locations - includes psychiatrist - if doing counseling and want psychiatrist at [facility] - must change counseling to [facility] as well  2. [facility]        [phone], or [phone]  Helps pt find a doctor who uses their services - takes insurance into consideration and can schedule appointment    3. [facility] child and family services     [place]   [phone]   [place]  [phone]  4. [facility] crisis  [phone]  5. [facility]- Dr. Annie Hernandez  [phone]   [place]   6. [facility] - counseling/coaching  [phone]  [place] based in [place] and [place]  7. [facility]   [phone]  [place] -counseling, psychologist  8. [facility]    [phone]  [facility]  9. [facility]  [phone] or [phone]  [facility]        Psychiatrist in the area  1. [facility]      [phone]  2. [facility] Mental Health  [phone]    Saritha Carmona NP (need to be a [facility] patient or have - sha      can prescribe medications)  3. [facility]     [place]      [phone]    [place]      [phone]  Extension# [id number]   [phone]    JORGE Bal - Not a psychiatrist but can order medications and is a little quicker to get into at times - does not go through [facility]  5. [facility]     [facility] [phone], Fax referral to [phone]    JORGE Hernandez - Schedule through [facility] at number above. Does not need to be connected with a provider in [facility] system. Patients ages 18+      [facility] Resources  Contact us for complete information on available services and eligibility information:   [facility] and Human Services Department   [address], [place] [address]   [phone]   [address] [place] [address]   [phone]   Toll  Free: [phone] web: [email or web address] health and human services

## 2017-04-11 NOTE — NURSING NOTE
"Chief Complaint   Patient presents with     Derm Problem     Vaginal Problem       Initial /72  Pulse 80  Temp 99  F (37.2  C) (Tympanic)  Resp 20  Wt 203 lb (92.1 kg)  LMP 03/28/2017  BMI 39.32 kg/m2 Estimated body mass index is 39.32 kg/(m^2) as calculated from the following:    Height as of 1/4/17: 5' 0.25\" (1.53 m).    Weight as of this encounter: 203 lb (92.1 kg).  Medication Reconciliation: complete  "

## 2017-04-11 NOTE — MR AVS SNAPSHOT
After Visit Summary   4/11/2017    Sebastian Schuster    MRN: [id number]           Patient Information     Date Of Birth          1980        Visit Information        Provider Department      4/11/2017 4:00 PM Karyna Stockton, CNP [facility]        Today's Diagnoses     Vaginal discharge    -  1    Yeast infection of the vagina        Tinea versicolor          Care Instructions    Results for orders placed or performed in visit on 04/11/17   Wet prep   Result Value Ref Range    Specimen Description Vagina     Wet Prep (A)      No Trichomonas seen  No clue cells seen  Yeast seen      Micro Report Status FINAL 04/11/2017      Kefir 1/4 cup daily for preventative    Tinea Versicolor  This is a rash caused by a fungus in the top layers of the skin. This fungus is normally present in the pores of the skin and causes no symptoms, but when the fungus overgrows it causes a rash. The fungus grows more easily in hot climates and on oily or sweaty skin. It is unknown why some people get this rash and others do not, or why the rash would suddenly appear in someone who has never had it before.  The rash consists of irregular, pale, or tan spots and patches. The rash is usually on the neck, upper back, chest, and shoulders. There may be mild itching, especially if you become overheated, but it doesn't cause other symptoms. Because these spots don't change color with sun exposure like normal skin, the rash may appear as lighter or darker than normal skin.  Since the rash is harmless and usually causes no symptoms, the only reason for treatment is to improve appearance. Follow the advice below to clear the rash. However, it will take several months for normal skin color to return.  Home care  The following guidelines will help you care for yourself at home:    Apply medicated dandruff shampoo (no prescription required) to the rash at night. Then, rinse off in the morning using a bath brush or  buffing sponge to scrub the skin and remove top layers of dead skin which contain the fungus. Do this every day for 4 weeks.    As an alternate treatment, you may buy an antifungal cream (miconazole or clotrimazole, which are available without a prescription) and apply this twice a day for 7 days. You should still use a bath brush or buffing sponge to scrub the skin once a day in the shower.    This rash is not contagious to others. So there is no need to avoid exposure of others at school, , or work.  Prevention  This fungus can recur after treatment. To prevent return of the rash, once a month for the next year, apply medicated dandruff shampoo to the areas where the rash once appeared. Rinse off in the morning and scrub the skin as described above. This is especially important to do in the summer time when the rash is most likely to recur.  Other prevention tips include:    Avoid oily skin products.    Wear loose clothing.    Use sunscreen and protect yourself from sunlight.    Avoid tanning beds.  Follow-up care  Follow up with your doctor or as advised by our staff if the rash fails to improve with the above treatment, or if new symptoms appear.  When to seek medical care  Get prompt medical attention if any of the following occur:    Increasing redness of the rash    Change in appearance of the rash    Fever of 100.4 F (38 C) or higher, or as directed by your health care provider    3119-9446 The "[employer]. [address] [place] [address]. All rights reserved. This information is not intended as a substitute for professional medical care. Always follow your healthcare professional's instructions.        Vaginal Infection: Yeast (Candidiasis)  Yeast infection occurs when yeast in the vagina increase and start attacking the vaginal tissues. Yeast is a type of fungus. These infections are often caused by a type of yeast called Candida albicans. Other species of yeast can also cause  "infections. Factors that may make infection more likely include recent antibiotic use, douching, or increased sex. Yeast infections are more common in women who have diabetes, or are obese or pregnant, or have a weak immune system.  Symptoms of yeast infection    Clumpy or thin, white discharge, which may look like cottage cheese    No odor or minimal odor    Severe vaginal itching or burning    Burning with urination    Swelling, redness of vulva    Pain during sex  Treating yeast infection  Yeast infection is treated with a vaginal antifungal cream. In some cases, antifungal pills are prescribed instead. During treatment:    Finish all of your medicine, even if your symptoms go away.    Apply the cream before going to bed. Lie flat after applying so that it doesn't drip out.    Do not douche or use tampons.    Don't rely on a diaphragm or condoms, since the cream may weaken them.    Avoid intercourse if advised by your healthcare provider.     Should I treat a yeast infection myself?  Discuss with your healthcare provider whether you should use over-the-counter medicines to treat a yeast infection. Self-treatment may depend on whether:    You've had a yeast infection in the past.    You're at risk for STDs.  Call your healthcare provider if symptoms do not go away or come back after treatment.     9440-4877 [employer]. [address] [place] [address], [place] [address]. All rights reserved. This information is not intended as a substitute for professional medical care. Always follow your healthcare professional's instructions.    Phone apps: mindful meditation    Mental Health Resources:   24hr Crisis Intervention   6-928-528-6435   [facility] (24 hour/7 days counselors) Text \"life\" to 48809   National [place] on Mental Illness            724.245.2677 or 710-771-6730.   [place] Association for Children's Mental Health         783.992.2837.   Alcoholics, Alanon, Narcotics Anonymous  "          [phone]  Suicide Awareness Voices of Education (SAVE)         1- 888-511-SAVE (9306)  National Suicide Prevention Line (www.mentalhealthmn.org)        042-558-ZVQS (6124)  Mental Health Consumer/Survivor Network of [place]         410.753.3061 or 297-730-7651  M[place] Association of [place]            346.292.5749 or [phone]  Mental Health referral options    1. [facility]'s     [phone]  Direct number but is a part of [facility]/DEC - two locations - includes psychiatrist - if doing counseling and want psychiatrist at [facility] - must change counseling to [facility] as well  2. [facility]/DEC        [phone], or [phone]  Helps pt find a doctor who uses their services - takes insurance into consideration and can schedule appointment    3. [facility] child and family services     [place]   [phone]   [place]  [phone]  4. [facility] crisis  [phone]  5. [facility]- Dr. Annie Hernandez  [phone]   [place]   6. [facility] - counseling/coaching  [phone]  [place] based in [facility]  7. [facility]   [phone]  [place] -counseling, psychologist  8. [facility]    [phone]  [facility]  9. [facility]  [phone] or [phone]  [facility]        Psychiatrist in the area  1. [facility]      [phone]  2. [facility] Mental Health  [phone]    Saritha Carmona NP (need to be a [facility] patient or have - sha      can prescribe medications)  3. [facility]     [place]      [phone]    [place]      [phone]  Extension# 2420[facility] Human Services   [phone]    Radha Lockhart, CNS - Not a psychiatrist but can order medications and is a little quicker to get into at times - does not go through [facility]  5. [place] clinics     [facility] [phone], Fax referral to [phone]    Kaela [place],  CNS - Schedule through [facility] at number above. Does not need to be connected with a provider in [place] system. Patients ages 18+      [facility] Resources  Contact us for complete information on available services and eligibility information:   [facility] and Human Services Department   [address], [place] [address]   [phone]   [address] [place] [address]   [phone]   Toll Free: [phone] web: [email or web address] and human services            Follow-ups after your visit        Who to contact     If you have questions or need follow up information about today's clinic visit or your schedule please contact [facility] directly at [phone].  Normal or non-critical lab and imaging results will be communicated to you by [employer], letter or phone within 4 business days after the clinic has received the results. If you do not hear from us within 7 days, please contact the clinic through [employer] or phone. If you have a critical or abnormal lab result, we will notify you by phone as soon as possible.  Submit refill requests through [employer] or call your pharmacy and they will forward the refill request to us. Please allow 3 business days for your refill to be completed.          Additional Information About Your Visit        [employer] Information     [employer] gives you secure access to your electronic health record. If you see a primary care provider, you can also send messages to your care team and make appointments. If you have questions, please call your primary care clinic.  If you do not have a primary care provider, please call [phone] and they will assist you.        Care EveryWhere ID     This is your Care EveryWhere ID. This could be used by other organizations to access your [place] medical records  [id number]        Your Vitals Were     Pulse Temperature Respirations Height Last Period BMI (Body Mass Index)    80 99  F (37.2  C)  "(Tympanic) 20 5' 0.25\" (1.53 m) 03/28/2017 39.32 kg/m2       Blood Pressure from Last 3 Encounters:   04/11/17 126/72   03/09/17 121/83   02/28/17 120/80    Weight from Last 3 Encounters:   04/11/17 203 lb (92.1 kg)   03/09/17 200 lb (90.7 kg)   02/28/17 203 lb (92.1 kg)              We Performed the Following     Wet prep          Today's Medication Changes          These changes are accurate as of: 4/11/17  4:42 PM.  If you have any questions, ask your nurse or doctor.               Start taking these medicines.        Dose/Directions    fluconazole 150 MG tablet   Commonly known as:  DIFLUCAN   Used for:  Yeast infection of the vagina   Started by:  Karyna Stockton CNP        Dose:  150 mg   Take 1 tablet (150 mg) by mouth once for 1 dose Take second one in 2-3 days   Quantity:  2 tablet   Refills:  0       ketoconazole 2 % shampoo   Commonly known as:  NIZORAL   Used for:  Tinea versicolor   Started by:  Karyna Stockton CNP        Apply to torso rash and leave on for 5 minutes, then wash off.  This can be done 3 days in a row.   Quantity:  120 mL   Refills:  1            Where to get your medicines      These medications were sent to [facility] [address] [place] [address]  [address] [place] [address]     Phone:  [phone]     fluconazole 150 MG tablet    ketoconazole 2 % shampoo                Primary Care Provider    None Specified       No primary provider on file.        Thank you!     Thank you for choosing [facility]  for your care. Our goal is always to provide you with excellent care. Hearing back from our patients is one way we can continue to improve our services. Please take a few minutes to complete the written survey that you may receive in the mail after your visit with us. Thank you!             Your Updated Medication List - Protect others around you: Learn how to safely use, store and throw away your medicines at " www.disposemymeds.org.          This list is accurate as of: 4/11/17  4:42 PM.  Always use your most recent med list.                   Brand Name Dispense Instructions for use    desogestrel-ethinyl estradiol 0.15-30 MG-MCG per tablet    APRI    84 tablet    Take 1 tablet by mouth daily       fluconazole 150 MG tablet    DIFLUCAN    2 tablet    Take 1 tablet (150 mg) by mouth once for 1 dose Take second one in 2-3 days       fluticasone 50 MCG/ACT spray    FLONASE    16 g    Spray 1-2 sprays into both nostrils daily       hydrOXYzine 25 MG tablet    ATARAX    40 tablet    Take 1 tablet (25 mg) by mouth every 6 hours as needed for anxiety       ketoconazole 2 % shampoo    NIZORAL    120 mL    Apply to torso rash and leave on for 5 minutes, then wash off.  This can be done 3 days in a row.

## 2017-04-11 NOTE — PROGRESS NOTES
SUBJECTIVE:                                                    Sebastian Schuster is a 36 year old female who presents to clinic today for the following health issues:      Rash      Duration: 1 week     Description  Location: Neck, shoulders, and back   Itching: no    Intensity:  Intensifies with sweating      Accompanying signs and symptoms: None    History (similar episodes/previous evaluation)  Had the same rash and was given a prescription shampoo/body wash     Precipitating or alleviating factors:  New exposures:  None  Recent travel: no      Therapies tried and outcome: none   Tinea versicolor diagnosed 10/22/2015- Nizoral 2% shampoo apply to dry skin and leave o for 5 mintues before washing off. Do this 3 days in a row    Vaginal Symptoms      Duration: 1 week     Description  vaginal discharge - light yellow and itching    Intensity:  mild    Accompanying signs and symptoms (fever/dysuria/abdominal or back pain): None    History  Sexually active: yes, single partner, contraception - oral contraceptives (combined)  Possibility of pregnancy: No  Recent antibiotic use: no     Precipitating or alleviating factors: None    Therapies tried and outcome: none   Outcome: NA     Anxiety  Noted at the end of the visit that she was recently seen for Anxiety  She was given information for counseling, she has not yet followed up on that   She doesn't really want medications  She did a FIDENCIO score via the phone- about the same  Results for orders placed or performed in visit on 04/11/17   Wet prep   Result Value Ref Range    Specimen Description Vagina     Wet Prep (A)      No Trichomonas seen  No clue cells seen  Yeast seen      Micro Report Status FINAL 04/11/2017      FIDENCIO-7 SCORE 5/1/2012 4/7/2017   Total Score 9 -   Total Score - 9 (mild anxiety)   Total Score - 9       PHQ-9 SCORE 5/26/2009 6/25/2009 5/1/2012   Total Score 0 0 6     HPI:     Patient Active Problem List   Diagnosis     Contraceptive management      "CARDIOVASCULAR SCREENING; LDL GOAL LESS THAN 160     Adjustment disorder with disturbance of emotion     Obesity (BMI 35.0-39.9 without comorbidity) (H)     Panic disorder without agoraphobia       Current Outpatient Prescriptions:      fluconazole (DIFLUCAN) 150 MG tablet, Take 1 tablet (150 mg) by mouth once for 1 dose Take second one in 2-3 days, Disp: 2 tablet, Rfl: 0     ketoconazole (NIZORAL) 2 % shampoo, Apply to torso rash and leave on for 5 minutes, then wash off.  This can be done 3 days in a row., Disp: 120 mL, Rfl: 1     hydrOXYzine (ATARAX) 25 MG tablet, Take 1 tablet (25 mg) by mouth every 6 hours as needed for anxiety, Disp: 40 tablet, Rfl: 0     fluticasone (FLONASE) 50 MCG/ACT spray, Spray 1-2 sprays into both nostrils daily, Disp: 16 g, Rfl: 0     desogestrel-ethinyl estradiol (APRI) 0.15-30 MG-MCG per tablet, Take 1 tablet by mouth daily, Disp: 84 tablet, Rfl: 1  Labs reviewed in EPIC      Reviewed and updated as needed this visit by Provider  Allergies  Meds  Problems      ROS:  Constitutional, HEENT, cardiovascular, pulmonary, gi and gu systems are negative, except as otherwise noted.  INTEGUMENTARY/SKIN: rash  : vaginal discharge  PSYCHIATRIC: history of anxiety    PHYSICAL EXAM:   /72  Pulse 80  Temp 99  F (37.2  C) (Tympanic)  Resp 20  Ht 5' 0.25\" (1.53 m)  Wt 203 lb (92.1 kg)  LMP 03/28/2017  BMI 39.32 kg/m2  Body mass index is 39.32 kg/(m^2).  GENERAL APPEARANCE: healthy, alert and no distress  RESP: lungs clear to auscultation - no rales, rhonchi or wheezes  CV: regular rates and rhythm, normal S1 S2, no S3 or S4 and no murmur, click or rub  SKIN: stuck-on brown spots to left side of neck and bilateral anterior torso  PSYCH: mentation appears normal and affect normal/bright      ASSESSMENT & PLAN:     (N89.8) Vaginal discharge  (primary encounter diagnosis)  Comment: acute  Plan: Wet prep        + Yeast    (B37.3) Yeast infection of the vagina  Comment: acute  Plan: " fluconazole (DIFLUCAN) 150 MG tablet          (B36.0) Tinea versicolor  Comment: acute on chronic  Plan: ketoconazole (NIZORAL) 2 % shampoo          (F41.0) Panic disorder without agoraphobia  Comment: chronic  Plan: schedule counseling    Patient Instructions     Results for orders placed or performed in visit on 04/11/17   Wet prep   Result Value Ref Range    Specimen Description Vagina     Wet Prep (A)      No Trichomonas seen  No clue cells seen  Yeast seen      Micro Report Status FINAL 04/11/2017      Kefir 1/4 cup daily for preventative    Tinea Versicolor  This is a rash caused by a fungus in the top layers of the skin. This fungus is normally present in the pores of the skin and causes no symptoms, but when the fungus overgrows it causes a rash. The fungus grows more easily in hot climates and on oily or sweaty skin. It is unknown why some people get this rash and others do not, or why the rash would suddenly appear in someone who has never had it before.  The rash consists of irregular, pale, or tan spots and patches. The rash is usually on the neck, upper back, chest, and shoulders. There may be mild itching, especially if you become overheated, but it doesn't cause other symptoms. Because these spots don't change color with sun exposure like normal skin, the rash may appear as lighter or darker than normal skin.  Since the rash is harmless and usually causes no symptoms, the only reason for treatment is to improve appearance. Follow the advice below to clear the rash. However, it will take several months for normal skin color to return.  Home care  The following guidelines will help you care for yourself at home:    Apply medicated dandruff shampoo (no prescription required) to the rash at night. Then, rinse off in the morning using a bath brush or buffing sponge to scrub the skin and remove top layers of dead skin which contain the fungus. Do this every day for 4 weeks.    As an alternate treatment, you  may buy an antifungal cream (miconazole or clotrimazole, which are available without a prescription) and apply this twice a day for 7 days. You should still use a bath brush or buffing sponge to scrub the skin once a day in the shower.    This rash is not contagious to others. So there is no need to avoid exposure of others at school, , or work.  Prevention  This fungus can recur after treatment. To prevent return of the rash, once a month for the next year, apply medicated dandruff shampoo to the areas where the rash once appeared. Rinse off in the morning and scrub the skin as described above. This is especially important to do in the summer time when the rash is most likely to recur.  Other prevention tips include:    Avoid oily skin products.    Wear loose clothing.    Use sunscreen and protect yourself from sunlight.    Avoid tanning beds.  Follow-up care  Follow up with your doctor or as advised by our staff if the rash fails to improve with the above treatment, or if new symptoms appear.  When to seek medical care  Get prompt medical attention if any of the following occur:    Increasing redness of the rash    Change in appearance of the rash    Fever of 100.4 F (38 C) or higher, or as directed by your health care provider    [phone] [employer]. [address] [place] [address], [place] [address]. All rights reserved. This information is not intended as a substitute for professional medical care. Always follow your healthcare professional's instructions.        Vaginal Infection: Yeast (Candidiasis)  Yeast infection occurs when yeast in the vagina increase and start attacking the vaginal tissues. Yeast is a type of fungus. These infections are often caused by a type of yeast called Candida albicans. Other species of yeast can also cause infections. Factors that may make infection more likely include recent antibiotic use, douching, or increased sex. Yeast infections are more common in women who  "have diabetes, or are obese or pregnant, or have a weak immune system.  Symptoms of yeast infection    Clumpy or thin, white discharge, which may look like cottage cheese    No odor or minimal odor    Severe vaginal itching or burning    Burning with urination    Swelling, redness of vulva    Pain during sex  Treating yeast infection  Yeast infection is treated with a vaginal antifungal cream. In some cases, antifungal pills are prescribed instead. During treatment:    Finish all of your medicine, even if your symptoms go away.    Apply the cream before going to bed. Lie flat after applying so that it doesn't drip out.    Do not douche or use tampons.    Don't rely on a diaphragm or condoms, since the cream may weaken them.    Avoid intercourse if advised by your healthcare provider.     Should I treat a yeast infection myself?  Discuss with your healthcare provider whether you should use over-the-counter medicines to treat a yeast infection. Self-treatment may depend on whether:    You've had a yeast infection in the past.    You're at risk for STDs.  Call your healthcare provider if symptoms do not go away or come back after treatment.     0577-8447 [employer]. [address] [place] [address]. All rights reserved. This information is not intended as a substitute for professional medical care. Always follow your healthcare professional's instructions.    Phone apps: mindful meditation    Mental Health Resources:   24hr Crisis Intervention   1-417-137-1910   [facility] (24 hour/7 days counselors) Text \"life\" to 22687   National [place] on Mental Illness            787.970.1510 or 119-612-4178.   [place] Association for Children's Mental Health         [phone].   Alcoholics, [place], Narcotics Anonymous           [phone]  Suicide Awareness Voices of Education (SAVE)         8- 665-511-SAVE (8679)  National Suicide Prevention Line (www.mentalhealthmn.org)        861-157-OEMK " (7796)  Mental Health Consumer/Survivor Network of [place]         [phone] or 984-852-8164  Mental Health Association of [place]            848.804.9253 or 579-333-8703  Mental Health referral options    1. [facility]     [phone]  Direct number but is a part of [facility] - two locations - includes psychiatrist - if doing counseling and want psychiatrist at [facility] - must change counseling to [facility] as well  2. [facility]        [phone], or [phone]  Helps pt find a doctor who uses their services - takes insurance into consideration and can schedule appointment    3. [facility] child and family services     [place]   [phone]   [place]  [phone]  4. [facility] crisis  [phone]  5. [facility]- Dr. Annie Hernandez  [phone]   [place]   6. [facility] - counseling/coaching  [phone]  [place] based in [facility]  7. [facility]   [phone]  [place] -counseling, psychologist  8. [facility]    [phone]  [facility]  9. [facility]  [phone] or [phone]  [facility], [facility]        Psychiatrist in the area  1. [facility]      [phone]  2. [facility]  [phone]    Saritha Carmona NP (need to be a [facility] patient or have - sha      can prescribe medications)  3. [facility]     [place]      [phone]    [place]      [phone]  Extension# 2420[facility]   [phone]    JORGE Bal - Not a psychiatrist but can order medications and is a little quicker to get into at times - does not go through [facility]  5. [place] clinics     [facility] [phone], Fax referral to [phone]    JORGE Hernandez - Schedule through [facility] at number above. Does not need to be connected with a provider in [place] system. Patients ages 18+      [facility] Resources  Contact   for complete information on available services and eligibility information:   [facility]   [address], [place] [address]   [phone]   [address] [facility] [address], [place] [address]   [phone]   Toll Free: [phone] web: co.[email or web address]. health and human services        Risks, benefits, side effects and rationale for treatment plan fully discussed with the patient and understanding expressed.    Karyna Stockton, [facility]

## 2017-07-21 DIAGNOSIS — N92.1 METRORRHAGIA: ICD-10-CM

## 2017-07-21 RX ORDER — DESOGESTREL AND ETHINYL ESTRADIOL 0.15-0.03
1 KIT ORAL DAILY
Qty: 84 TABLET | Refills: 0 | Status: SHIPPED | OUTPATIENT
Start: 2017-07-21 | End: 2017-10-12

## 2017-07-21 NOTE — TELEPHONE ENCOUNTER
ENSKYCE 0.15-30 MG-MCG per tablet   Last Written Prescription Date: ?  Last Fill Quantity: ?,  # refills: ?   Last Office Visit with [facility], [facility] or [facility] prescribing provider: 04/11/2017

## 2017-10-12 DIAGNOSIS — N92.1 METRORRHAGIA: ICD-10-CM

## 2017-10-12 RX ORDER — DESOGESTREL AND ETHINYL ESTRADIOL 0.15-0.03
1 KIT ORAL DAILY
Qty: 84 TABLET | Refills: 1 | Status: SHIPPED | OUTPATIENT
Start: 2017-10-12 | End: 2018-03-28

## 2017-10-12 ASSESSMENT — ANXIETY QUESTIONNAIRES: 7. FEELING AFRAID AS IF SOMETHING AWFUL MIGHT HAPPEN: NOT AT ALL

## 2017-10-12 NOTE — TELEPHONE ENCOUNTER
desogestrel-ethinyl estradiol (ENSKYCE) 0.15-30 MG-MCG per tablet      Last Written Prescription Date: 7/21/17  Last Fill Quantity: 84, # refills: 0  Last Office Visit with [facility], P or [facility] prescribing provider: 4/11/17       BP Readings from Last 3 Encounters:   04/11/17 126/72   03/09/17 121/83   02/28/17 120/80     Date of last Breast Exam: unknown

## 2018-03-28 DIAGNOSIS — N92.1 METRORRHAGIA: ICD-10-CM

## 2018-03-28 RX ORDER — DESOGESTREL AND ETHINYL ESTRADIOL 0.15-0.03
KIT ORAL
Qty: 84 TABLET | Refills: 3 | Status: SHIPPED | OUTPATIENT
Start: 2018-03-28 | End: 2019-03-01

## 2018-03-28 NOTE — TELEPHONE ENCOUNTER
"Requested Prescriptions   Pending Prescriptions Disp Refills     ENSKYCE 0.15-30 MG-MCG per tablet [Pharmacy Med Name: ENSKYCE TAB] 84 tablet 1     Sig: TAKE ONE TABLET BY MOUTH ONCE DAILY    Contraceptives Protocol Passed    3/28/2018 12:47 PM       Passed - Patient is not a current smoker if age is 35 or older       Passed - Recent (12 mo) or future (30 days) visit within the authorizing provider's specialty    Patient had office visit in the last 12 months or has a visit in the next 30 days with authorizing provider or within the authorizing provider's specialty.  See \"Patient Info\" tab in inbasket, or \"Choose Columns\" in Meds & Orders section of the refill encounter.           Passed - No active pregnancy on record       Passed - No positive pregnancy test in past 12 months        Last Written Prescription Date:  10/12/17  Last Fill Quantity: 84,  # refills: 3   Last office visit: 4/11/2017 with prescribing provider:     Future Office Visit:      "

## 2018-03-28 NOTE — TELEPHONE ENCOUNTER
Routing refill request to provider for review/approval because:  Patient needs to be seen because:  Medication has not been reviewed since 8-26-15.  Please advise refill.    STUART Shields

## 2018-04-12 ENCOUNTER — TELEPHONE (OUTPATIENT)
Dept: FAMILY MEDICINE | Facility: CLINIC | Age: 38
End: 2018-04-12

## 2018-04-12 ASSESSMENT — ANXIETY QUESTIONNAIRES
1. FEELING NERVOUS, ANXIOUS, OR ON EDGE: SEVERAL DAYS
3. WORRYING TOO MUCH ABOUT DIFFERENT THINGS: MORE THAN HALF THE DAYS
6. BECOMING EASILY ANNOYED OR IRRITABLE: SEVERAL DAYS
7. FEELING AFRAID AS IF SOMETHING AWFUL MIGHT HAPPEN: SEVERAL DAYS
5. BEING SO RESTLESS THAT IT IS HARD TO SIT STILL: NOT AT ALL
2. NOT BEING ABLE TO STOP OR CONTROL WORRYING: MORE THAN HALF THE DAYS
GAD7 TOTAL SCORE: 7
IF YOU CHECKED OFF ANY PROBLEMS ON THIS QUESTIONNAIRE, HOW DIFFICULT HAVE THESE PROBLEMS MADE IT FOR YOU TO DO YOUR WORK, TAKE CARE OF THINGS AT HOME, OR GET ALONG WITH OTHER PEOPLE: NOT DIFFICULT AT ALL

## 2018-04-12 ASSESSMENT — PATIENT HEALTH QUESTIONNAIRE - PHQ9: 5. POOR APPETITE OR OVEREATING: NOT AT ALL

## 2018-04-12 NOTE — TELEPHONE ENCOUNTER
[facility] was due for a FIDENCIO. She called and we did a PHQ-9 and GAD7 over the phone.     Please advise.    Kaley Alston-[place]

## 2018-04-12 NOTE — TELEPHONE ENCOUNTER
PHQ-9 SCORE 6/25/2009 5/1/2012 4/12/2018   Total Score 0 6 -   Total Score - - 6     FIDENCIO-7 SCORE 5/1/2012 4/7/2017 4/12/2018   Total Score 9 - -   Total Score - 9 (mild anxiety) -   Total Score - 9 7       Both are stable. Continue to follow with Counselor.   Karyna Stockton, DAVIDE

## 2018-04-13 ASSESSMENT — ANXIETY QUESTIONNAIRES: GAD7 TOTAL SCORE: 7

## 2018-04-13 ASSESSMENT — PATIENT HEALTH QUESTIONNAIRE - PHQ9: SUM OF ALL RESPONSES TO PHQ QUESTIONS 1-9: 6

## 2018-06-29 ENCOUNTER — OFFICE VISIT (OUTPATIENT)
Dept: FAMILY MEDICINE | Facility: CLINIC | Age: 38
End: 2018-06-29
Payer: COMMERCIAL

## 2018-06-29 VITALS
RESPIRATION RATE: 20 BRPM | HEART RATE: 76 BPM | HEIGHT: 60 IN | TEMPERATURE: 99 F | WEIGHT: 218 LBS | SYSTOLIC BLOOD PRESSURE: 128 MMHG | BODY MASS INDEX: 42.8 KG/M2 | DIASTOLIC BLOOD PRESSURE: 80 MMHG

## 2018-06-29 DIAGNOSIS — R10.32 ABDOMINAL PAIN, LEFT LOWER QUADRANT: Primary | ICD-10-CM

## 2018-06-29 DIAGNOSIS — Z97.5 IUD CONTRACEPTION: ICD-10-CM

## 2018-06-29 LAB
ALBUMIN UR-MCNC: NEGATIVE MG/DL
APPEARANCE UR: CLEAR
BILIRUB UR QL STRIP: NEGATIVE
COLOR UR AUTO: YELLOW
GLUCOSE UR STRIP-MCNC: NEGATIVE MG/DL
HGB UR QL STRIP: NEGATIVE
KETONES UR STRIP-MCNC: NEGATIVE MG/DL
LEUKOCYTE ESTERASE UR QL STRIP: NEGATIVE
NITRATE UR QL: NEGATIVE
PH UR STRIP: 6.5 PH (ref 5–7)
SOURCE: NORMAL
SP GR UR STRIP: <=1.005 (ref 1–1.03)
UROBILINOGEN UR STRIP-ACNC: 0.2 EU/DL (ref 0.2–1)

## 2018-06-29 PROCEDURE — 81003 URINALYSIS AUTO W/O SCOPE: CPT | Performed by: NURSE PRACTITIONER

## 2018-06-29 PROCEDURE — 99214 OFFICE O/P EST MOD 30 MIN: CPT | Performed by: NURSE PRACTITIONER

## 2018-06-29 RX ORDER — IBUPROFEN 800 MG/1
800 TABLET, FILM COATED ORAL EVERY 8 HOURS PRN
Qty: 60 TABLET | Refills: 1 | Status: SHIPPED | OUTPATIENT
Start: 2018-06-29 | End: 2019-03-14

## 2018-06-29 NOTE — PROGRESS NOTES
SUBJECTIVE:   Sebastian Schuster is a 37 year old female who presents to clinic today for the following health issues:      Abdominal Pain      Duration: 3 days (though it is feeling much better right now)    Description (location/character/radiation): Lt lower area (effie of along the groin line)       Associated flank pain: Lt sided back pain     Intensity:  Moderate all the time and severe when pushing to urinate or have a bowel movement     Accompanying signs and symptoms:        Fever/Chills: no        Gas/Bloating: no        Nausea/vomitting: no        Diarrhea: no        Dysuria or Hematuria: no     History (previous similar pain/trauma/previous testing): No    Precipitating or alleviating factors:       Pain worse with eating/BM/urination: see above       Pain relieved by BM: no     Therapies tried and outcome: Tylenol 1000 mg alternating  every 4 hours    LMP:  2018    Regular periods lasting 5 days  Last period more painful  Feeling a little more PMS symptoms over the last 2 months  Some more weight gain, would like to switch birth control  No FMHX of gynecological concerns   X 2   BM regular daily, no change to consistency  History of cholecystectomy        HPI:   PCP:  Karyna Stockton, [facility] [phone]    Patient Active Problem List   Diagnosis     Contraceptive management     CARDIOVASCULAR SCREENING; LDL GOAL LESS THAN 160     Adjustment disorder with disturbance of emotion     Obesity (BMI 35.0-39.9 without comorbidity)     Panic disorder without agoraphobia     Current Outpatient Prescriptions   Medication     ENSKYCE 0.15-30 MG-MCG per tablet     fluticasone (FLONASE) 50 MCG/ACT spray     hydrOXYzine (ATARAX) 25 MG tablet     ibuprofen (ADVIL/MOTRIN) 800 MG tablet     ketoconazole (NIZORAL) 2 % shampoo     No current facility-administered medications for this visit.        There are no preventive care reminders to display for this patient.    Reviewed and updated:  Tobacco   "Allergies  Meds  Med Hx  Surg Hx  Fam Hx  Soc Hx     ROS:  Constitutional, neuro, ENT, endocrine, pulmonary, cardiac, gastrointestinal, genitourinary, musculoskeletal, integument and psychiatric systems are negative, except as otherwise noted.    PHYSICAL EXAM:   /80 (BP Location: Right arm, Patient Position: Sitting, Cuff Size: Adult Large)  Pulse 76  Temp 99  F (37.2  C) (Tympanic)  Resp 20  Ht 5' 0.25\" (1.53 m)  Wt 218 lb (98.9 kg)  LMP 06/21/2018  BMI 42.22 kg/m2  Body mass index is 42.22 kg/(m^2).  GENERAL APPEARANCE: healthy, alert, no distress and over weight  RESP: lungs clear to auscultation - no rales, rhonchi or wheezes  CV: regular rates and rhythm, normal S1 S2, no S3 or S4 and no murmur, click or rub  ABDOMEN: soft, nontender, without hepatosplenomegaly or masses, bowel sounds normal and no organomegaly, neg obturator sign, neg BcBurney's, no point tenderness, pain is not reproducible at visit  MS: extremities normal- no gross deformities noted    hip flexors bilaterally are tested, strong, not tight, no pain to LLQ  PSYCH: mentation appears normal and affect normal/bright    ASSESSMENT & PLAN:   1. Abdominal pain, left lower quadrant  Acute  - *UA reflex to Microscopic and Culture ([facility] (except [place] and [place])  - US Pelvic Complete with Transvaginal; Future  - ibuprofen (ADVIL/MOTRIN) 800 MG tablet; Take 1 tablet (800 mg) by mouth every 8 hours as needed for moderate pain  Dispense: 60 tablet; Refill: 1  - Use Tylenol in between dosing of Ibuprofen  - Push fluids  - If worsening symptoms over the weekend, proceed to ER    Results for orders placed or performed in visit on 06/29/18   *UA reflex to Microscopic and Culture ([facility] (except [place] and [place])   Result Value Ref Range    Color Urine Yellow     Appearance Urine Clear     Glucose Urine Negative NEG^Negative mg/dL    Bilirubin Urine Negative NEG^Negative    Ketones " Urine Negative NEG^Negative mg/dL    Specific Gravity Urine <=1.005 1.003 - 1.035    Blood Urine Negative NEG^Negative    pH Urine 6.5 5.0 - 7.0 pH    Protein Albumin Urine Negative NEG^Negative mg/dL    Urobilinogen Urine 0.2 0.2 - 1.0 EU/dL    Nitrite Urine Negative NEG^Negative    Leukocyte Esterase Urine Negative NEG^Negative    Source Midstream Urine      2. IUD contraception  Interested in new form of contraception  - OB/GYN REFERRAL    Risks, benefits, side effects and rationale for treatment plan fully discussed with the patient and understanding expressed.    Karyna Stockton, [facility]

## 2018-06-29 NOTE — PATIENT INSTRUCTIONS
1. Abdominal pain, left lower quadrant  Acute  - *UA reflex to Microscopic and Culture ([place] [facility] (except [place] and [place])  - US Pelvic Complete with Transvaginal; Future  - ibuprofen (ADVIL/MOTRIN) 800 MG tablet; Take 1 tablet (800 mg) by mouth every 8 hours as needed for moderate pain  Dispense: 60 tablet; Refill: 1  - Use Tylenol in between dosing of Ibuprofen  - Push fluids  - If worsening symptoms over the weekend, proceed to ER    Results for orders placed or performed in visit on 06/29/18   *UA reflex to Microscopic and Culture ([place] [facility] (except [place] and [place])   Result Value Ref Range    Color Urine Yellow     Appearance Urine Clear     Glucose Urine Negative NEG^Negative mg/dL    Bilirubin Urine Negative NEG^Negative    Ketones Urine Negative NEG^Negative mg/dL    Specific Gravity Urine <=1.005 1.003 - 1.035    Blood Urine Negative NEG^Negative    pH Urine 6.5 5.0 - 7.0 pH    Protein Albumin Urine Negative NEG^Negative mg/dL    Urobilinogen Urine 0.2 0.2 - 1.0 EU/dL    Nitrite Urine Negative NEG^Negative    Leukocyte Esterase Urine Negative NEG^Negative    Source Midstream Urine

## 2018-06-29 NOTE — NURSING NOTE
"Chief Complaint   Patient presents with     Abdominal Pain       Initial /80 (BP Location: Right arm, Patient Position: Sitting, Cuff Size: Adult Large)  Pulse 76  Temp 99  F (37.2  C) (Tympanic)  Resp 20  Ht 5' 0.25\" (1.53 m)  Wt 218 lb (98.9 kg)  LMP 06/21/2018  BMI 42.22 kg/m2 Estimated body mass index is 42.22 kg/(m^2) as calculated from the following:    Height as of this encounter: 5' 0.25\" (1.53 m).    Weight as of this encounter: 218 lb (98.9 kg).      Health Maintenance that is potentially due pending provider review:  NONE    n/a    Is there anyone who you would like to be able to receive your results? No  If yes have patient fill out DANE    "

## 2018-06-29 NOTE — MR AVS SNAPSHOT
After Visit Summary   6/29/2018    Sebastian Schuster    MRN: [id number]           Patient Information     Date Of Birth          1980        Visit Information        Provider Department      6/29/2018 3:20 PM Karyna Stockton CNP [facility]        Today's Diagnoses     Abdominal pain, left lower quadrant    -  1    IUD contraception          Care Instructions    1. Abdominal pain, left lower quadrant  Acute  - *UA reflex to Microscopic and Culture ([place] [facility] (except [place] and [place])  - US Pelvic Complete with Transvaginal; Future  - ibuprofen (ADVIL/MOTRIN) 800 MG tablet; Take 1 tablet (800 mg) by mouth every 8 hours as needed for moderate pain  Dispense: 60 tablet; Refill: 1  - Use Tylenol in between dosing of Ibuprofen  - Push fluids  - If worsening symptoms over the weekend, proceed to ER    Results for orders placed or performed in visit on 06/29/18   *UA reflex to Microscopic and Culture ([facility] (except [place] and [place])   Result Value Ref Range    Color Urine Yellow     Appearance Urine Clear     Glucose Urine Negative NEG^Negative mg/dL    Bilirubin Urine Negative NEG^Negative    Ketones Urine Negative NEG^Negative mg/dL    Specific Gravity Urine <=1.005 1.003 - 1.035    Blood Urine Negative NEG^Negative    pH Urine 6.5 5.0 - 7.0 pH    Protein Albumin Urine Negative NEG^Negative mg/dL    Urobilinogen Urine 0.2 0.2 - 1.0 EU/dL    Nitrite Urine Negative NEG^Negative    Leukocyte Esterase Urine Negative NEG^Negative    Source Midstream Urine                Follow-ups after your visit        Additional Services     OB/GYN REFERRAL       Your provider has referred you to:  [facility]: [facility] [phone]   [email or web address]  Dr. Noland in [place] site    Please be aware that coverage of these services is subject to the terms and limitations of your health insurance plan.  Call  member services at your health plan with any benefit or coverage questions.      Please bring the following with you to your appointment:    (1) Any X-Rays, CTs or MRIs which have been performed.  Contact the facility where they were done to arrange for  prior to your scheduled appointment.   (2) List of current medications   (3) This referral request   (4) Any documents/labs given to you for this referral                  Future tests that were ordered for you today     Open Future Orders        Priority Expected Expires Ordered    US Pelvic Complete with Transvaginal Routine  6/29/2019 6/29/2018            Who to contact     If you have questions or need follow up information about today's clinic visit or your schedule please contact [facility] directly at [phone].  Normal or non-critical lab and imaging results will be communicated to you by BizeeBeehart, letter or phone within 4 business days after the clinic has received the results. If you do not hear from us within 7 days, please contact the clinic through BizeeBeehart or phone. If you have a critical or abnormal lab result, we will notify you by phone as soon as possible.  Submit refill requests through [email or web address] or call your pharmacy and they will forward the refill request to us. Please allow 3 business days for your refill to be completed.          Additional Information About Your Visit        [email or web address] Information     [email or web address] gives you secure access to your electronic health record. If you see a primary care provider, you can also send messages to your care team and make appointments. If you have questions, please call your primary care clinic.  If you do not have a primary care provider, please call [phone] and they will assist you.        Care EveryWhere ID     This is your Care EveryWhere ID. This could be used by other organizations to access your [place] medical records  [id number]        Your Vitals Were     Pulse Temperature  "Respirations Height Last Period BMI (Body Mass Index)    76 99  F (37.2  C) (Tympanic) 20 5' 0.25\" (1.53 m) 06/21/2018 42.22 kg/m2       Blood Pressure from Last 3 Encounters:   06/29/18 128/80   04/11/17 126/72   03/09/17 121/83    Weight from Last 3 Encounters:   06/29/18 218 lb (98.9 kg)   04/11/17 203 lb (92.1 kg)   03/09/17 200 lb (90.7 kg)              We Performed the Following     *UA reflex to Microscopic and Culture ([place] [facility] (except [place] and [place])     OB/GYN REFERRAL          Today's Medication Changes          These changes are accurate as of 6/29/18  3:50 PM.  If you have any questions, ask your nurse or doctor.               Start taking these medicines.        Dose/Directions    ibuprofen 800 MG tablet   Commonly known as:  ADVIL/MOTRIN   Used for:  Abdominal pain, left lower quadrant   Started by:  Karyna Stockton CNP        Dose:  800 mg   Take 1 tablet (800 mg) by mouth every 8 hours as needed for moderate pain   Quantity:  60 tablet   Refills:  1            Where to get your medicines      [facility] [address] [place] [address]  [address] [address]     Phone:  [phone]     ibuprofen 800 MG tablet                Primary Care Provider Office Phone # Fax #    Karyna Stockton [phone]       [address] [place] [id number]        Equal Access to Services     [facility] AH: Hadii aad ku hadasho Soraúlali, waaxda luqadaha, qaybta kaalmada adeegyada, gaby rankin. So [facility] [phone].    ATENCIÓN: Si juanla ramona, tiene a south disposición servicios gratuitos de asistencia lingüística. Llame al [phone].    We comply with applicable federal civil rights laws and [place] laws. We do not discriminate on the basis of race, color, national origin, age, disability, sex, sexual orientation, or gender identity.            Thank you!     " Thank you for choosing [facility]  for your care. Our goal is always to provide you with excellent care. Hearing back from our patients is one way we can continue to improve our services. Please take a few minutes to complete the written survey that you may receive in the mail after your visit with us. Thank you!             Your Updated Medication List - Protect others around you: Learn how to safely use, store and throw away your medicines at www.disposemymeds.org.          This list is accurate as of 6/29/18  3:50 PM.  Always use your most recent med list.                   Brand Name Dispense Instructions for use Diagnosis    ENSKYCE 0.15-30 MG-MCG per tablet   Generic drug:  desogestrel-ethinyl estradiol     84 tablet    TAKE ONE TABLET BY MOUTH ONCE DAILY    Metrorrhagia       fluticasone 50 MCG/ACT spray    FLONASE    16 g    Spray 1-2 sprays into both nostrils daily    Acute serous otitis media of left ear, recurrence not specified       hydrOXYzine 25 MG tablet    ATARAX    40 tablet    Take 1 tablet (25 mg) by mouth every 6 hours as needed for anxiety    Anxiety attack       ibuprofen 800 MG tablet    ADVIL/MOTRIN    60 tablet    Take 1 tablet (800 mg) by mouth every 8 hours as needed for moderate pain    Abdominal pain, left lower quadrant       ketoconazole 2 % shampoo    NIZORAL    120 mL    Apply to torso rash and leave on for 5 minutes, then wash off.  This can be done 3 days in a row.    Tinea versicolor

## 2018-07-02 ENCOUNTER — RADIANT APPOINTMENT (OUTPATIENT)
Dept: GENERAL RADIOLOGY | Facility: CLINIC | Age: 38
End: 2018-07-02
Attending: NURSE PRACTITIONER
Payer: COMMERCIAL

## 2018-07-02 DIAGNOSIS — R10.32 ABDOMINAL PAIN, LEFT LOWER QUADRANT: ICD-10-CM

## 2018-07-02 PROCEDURE — 76856 US EXAM PELVIC COMPLETE: CPT

## 2018-07-02 PROCEDURE — 76830 TRANSVAGINAL US NON-OB: CPT

## 2018-07-05 ENCOUNTER — MYC MEDICAL ADVICE (OUTPATIENT)
Dept: FAMILY MEDICINE | Facility: CLINIC | Age: 38
End: 2018-07-05

## 2018-07-05 NOTE — TELEPHONE ENCOUNTER
Per 07-02-18 pelvic US-     Read by Sebastian Schuster at 7/5/2018  2:26 PM   Dear Sebastian,   Looks like 2 cysts on the left ovary. Same treatment as we discussed in clinic. Use Ibuprofen starting 3 days before your cycle, and birth control pills are part of the treatment options for chronic ovarian cysts. If pain is too bad, then I would suggest referral to OBGYN to discuss further treatment options- surgical removal.   Please contact our clinic via phone or My Chart if you have any questions or concerns.   Thanks,   DAVIDE Maxwell      Forwarded to Hossein David, RN

## 2018-07-05 NOTE — PROGRESS NOTES
Dear Sebastian,  Looks like 2 cysts on the left ovary. Same treatment as we discussed in clinic. Use Ibuprofen starting 3 days before your cycle, and birth control pills are part of the treatment options for chronic ovarian cysts. If pain is too bad, then I would suggest referral to OBGYN to discuss further treatment options- surgical removal.   Please contact our clinic via phone or My Chart if you have any questions or concerns.  Thanks,  DAVIDE Maxwell

## 2018-07-06 NOTE — TELEPHONE ENCOUNTER
Sebastian,   The birth control pill can prevent some cysts from growing, but it won't break up a cyst. What I would recommend is a repeat ultrasound in 3 months to see if the cysts have resolved. If they have, great. If not, and you are still experiencing pain, then I think a discussion with OBGYN would be a good idea to evaluate your individual options.   Karyna Stockton, DAVIDE    [place] message sent

## 2018-09-27 ENCOUNTER — OFFICE VISIT (OUTPATIENT)
Dept: FAMILY MEDICINE | Facility: CLINIC | Age: 38
End: 2018-09-27
Payer: COMMERCIAL

## 2018-09-27 VITALS
RESPIRATION RATE: 20 BRPM | HEART RATE: 96 BPM | SYSTOLIC BLOOD PRESSURE: 110 MMHG | DIASTOLIC BLOOD PRESSURE: 76 MMHG | OXYGEN SATURATION: 98 % | BODY MASS INDEX: 42.45 KG/M2 | TEMPERATURE: 98.7 F | WEIGHT: 216.2 LBS | HEIGHT: 60 IN

## 2018-09-27 DIAGNOSIS — H65.91 OME (OTITIS MEDIA WITH EFFUSION), RIGHT: Primary | ICD-10-CM

## 2018-09-27 PROCEDURE — 99213 OFFICE O/P EST LOW 20 MIN: CPT | Performed by: NURSE PRACTITIONER

## 2018-09-27 RX ORDER — AZITHROMYCIN 250 MG/1
TABLET, FILM COATED ORAL
Qty: 6 TABLET | Refills: 0 | Status: SHIPPED | OUTPATIENT
Start: 2018-09-27 | End: 2018-12-06

## 2018-09-27 NOTE — NURSING NOTE
"Chief Complaint   Patient presents with     Otalgia       Initial /76  Pulse 96  Temp 98.7  F (37.1  C) (Tympanic)  Resp 20  Ht 5' 0.24\" (1.53 m)  Wt 216 lb 3.2 oz (98.1 kg)  LMP 09/10/2018 (Exact Date)  SpO2 98%  BMI 41.89 kg/m2 Estimated body mass index is 41.89 kg/(m^2) as calculated from the following:    Height as of this encounter: 5' 0.24\" (1.53 m).    Weight as of this encounter: 216 lb 3.2 oz (98.1 kg).    Patient presents to the clinic using No DME    Health Maintenance that is potentially due pending provider review:  NONE    n/a    Is there anyone who you would like to be able to receive your results? No  If yes have patient fill out DANE      "

## 2018-09-27 NOTE — PATIENT INSTRUCTIONS
Increase rest and fluids. Tylenol and/or Ibuprofen for comfort. Cool mist vaporizer. If your symptoms worsen or do not resolve follow up with your primary care provider in 1 week and sooner if needed.        Indications for emergent return to emergency department discussed with patient, who verbalized good understanding and agreement.  Patient understands the limitations of today's evaluation.           Middle Ear Infection (Adult)  You have an infection of the middle ear, the space behind the eardrum. This is also called acute otitis media (AOM). Sometimes it is caused by the common cold. This is because congestion can block the internal passage (eustachian tube) that drains fluid from the middle ear. When the middle ear fills with fluid, bacteria can grow there and cause an infection. Oral antibiotics are used to treat this illness, not ear drops. Symptoms usually start to improve within 1 to 2 days of treatment.    Home care  The following are general care guidelines:    Finish all of the antibiotic medicine given, even though you may feel better after the first few days.    You may use over-the-counter medicine, such as acetaminophen or ibuprofen, to control pain and fever, unless something else was prescribed. If you have chronic liver or kidney disease or have ever had a stomach ulcer or gastrointestinal bleeding, talk with your healthcare provider before using these medicines. Do not give aspirin to anyone under 18 years of age who has a fever. It may cause severe illness or death.  Follow-up care  Follow up with your healthcare provider, or as advised, in 2 weeks if all symptoms have not gotten better, or if hearing doesn't go back to normal within 1 month.  When to seek medical advice  Call your healthcare provider right away if any of these occur:    Ear pain gets worse or does not improve after 3 days of treatment    Unusual drowsiness or confusion    Neck pain, stiff neck, or headache    Fluid or blood  draining from the ear canal    Fever of 100.4 F (38 C) or as advised     Seizure  Date Last Reviewed: 6/1/2016 2000-2017 [employer]. [address] [place] [address], [place] [address]. All rights reserved. This information is not intended as a substitute for professional medical care. Always follow your healthcare professional's instructions.

## 2018-09-27 NOTE — MR AVS SNAPSHOT
After Visit Summary   9/27/2018    Sebastian Schuster    MRN: [id number]           Patient Information     Date Of Birth          1980        Visit Information        Provider Department      9/27/2018 11:40 AM Janett Mix APRN [facility]        Today's Diagnoses     OME (otitis media with effusion), right    -  1      Care Instructions    Increase rest and fluids. Tylenol and/or Ibuprofen for comfort. Cool mist vaporizer. If your symptoms worsen or do not resolve follow up with your primary care provider in 1 week and sooner if needed.        Indications for emergent return to emergency department discussed with patient, who verbalized good understanding and agreement.  Patient understands the limitations of today's evaluation.           Middle Ear Infection (Adult)  You have an infection of the middle ear, the space behind the eardrum. This is also called acute otitis media (AOM). Sometimes it is caused by the common cold. This is because congestion can block the internal passage (eustachian tube) that drains fluid from the middle ear. When the middle ear fills with fluid, bacteria can grow there and cause an infection. Oral antibiotics are used to treat this illness, not ear drops. Symptoms usually start to improve within 1 to 2 days of treatment.    Home care  The following are general care guidelines:    Finish all of the antibiotic medicine given, even though you may feel better after the first few days.    You may use over-the-counter medicine, such as acetaminophen or ibuprofen, to control pain and fever, unless something else was prescribed. If you have chronic liver or kidney disease or have ever had a stomach ulcer or gastrointestinal bleeding, talk with your healthcare provider before using these medicines. Do not give aspirin to anyone under 18 years of age who has a fever. It may cause severe illness or death.  Follow-up care  Follow up with your  healthcare provider, or as advised, in 2 weeks if all symptoms have not gotten better, or if hearing doesn't go back to normal within 1 month.  When to seek medical advice  Call your healthcare provider right away if any of these occur:    Ear pain gets worse or does not improve after 3 days of treatment    Unusual drowsiness or confusion    Neck pain, stiff neck, or headache    Fluid or blood draining from the ear canal    Fever of 100.4 F (38 C) or as advised     Seizure  Date Last Reviewed: 6/1/2016 2000-2017 [employer]. [address] [place] [address]. All rights reserved. This information is not intended as a substitute for professional medical care. Always follow your healthcare professional's instructions.                Follow-ups after your visit        Follow-up notes from your care team     See patient instructions section of the AVS Return in about 1 week (around 10/4/2018), or if symptoms worsen or fail to improve, for Follow up with your primary care provider.      Who to contact     If you have questions or need follow up information about today's clinic visit or your schedule please contact [facility] directly at [phone].  Normal or non-critical lab and imaging results will be communicated to you by TIM Grouphart, letter or phone within 4 business days after the clinic has received the results. If you do not hear from us within 7 days, please contact the clinic through TIM Grouphart or phone. If you have a critical or abnormal lab result, we will notify you by phone as soon as possible.  Submit refill requests through Twelve or call your pharmacy and they will forward the refill request to us. Please allow 3 business days for your refill to be completed.          Additional Information About Your Visit        MyChart Information     Twelve gives you secure access to your electronic health record. If you see a primary care provider, you can also send messages  "to your care team and make appointments. If you have questions, please call your primary care clinic.  If you do not have a primary care provider, please call [phone] and they will assist you.        Care EveryWhere ID     This is your Care EveryWhere ID. This could be used by other organizations to access your [place] medical records  [id number]        Your Vitals Were     Pulse Temperature Respirations Height Last Period Pulse Oximetry    96 98.7  F (37.1  C) (Tympanic) 20 5' 0.24\" (1.53 m) 09/10/2018 (Exact Date) 98%    BMI (Body Mass Index)                   41.89 kg/m2            Blood Pressure from Last 3 Encounters:   09/27/18 110/76   06/29/18 128/80   04/11/17 126/72    Weight from Last 3 Encounters:   09/27/18 216 lb 3.2 oz (98.1 kg)   06/29/18 218 lb (98.9 kg)   04/11/17 203 lb (92.1 kg)              Today, you had the following     No orders found for display         Today's Medication Changes          These changes are accurate as of 9/27/18 12:03 PM.  If you have any questions, ask your nurse or doctor.               Start taking these medicines.        Dose/Directions    azithromycin 250 MG tablet   Commonly known as:  ZITHROMAX Z-GUERITA   Used for:  OME (otitis media with effusion), right   Started by:  Janett Mix APRN CNP        Take 2 tablets on day 1 and then take 1 tablet days 2-5   Quantity:  6 tablet   Refills:  0            Where to get your medicines      These medications were sent to [facility] [address] [address]     Phone:  [phone]     azithromycin 250 MG tablet                Primary Care Provider Office Phone # Fax #    Karyna Stockton [phone]       [address] [place] [address]        Equal Access to Services     AMANDA LAMAS AH: Inga jones Soapryl, waaxda luqadaha, qaybta kaalmaananya chester, gaby davis " ah. So [facility] [phone].    ATENCIÓN: Si abraham greene, tiene a south disposición servicios gratuitos de asistencia lingüística. Brown dubon [phone].    We comply with applicable federal civil rights laws and [place] laws. We do not discriminate on the basis of race, color, national origin, age, disability, sex, sexual orientation, or gender identity.            Thank you!     Thank you for choosing [facility]  for your care. Our goal is always to provide you with excellent care. Hearing back from our patients is one way we can continue to improve our services. Please take a few minutes to complete the written survey that you may receive in the mail after your visit with us. Thank you!             Your Updated Medication List - Protect others around you: Learn how to safely use, store and throw away your medicines at www.disposemymeds.org.          This list is accurate as of 9/27/18 12:03 PM.  Always use your most recent med list.                   Brand Name Dispense Instructions for use Diagnosis    azithromycin 250 MG tablet    ZITHROMAX Z-GUERITA    6 tablet    Take 2 tablets on day 1 and then take 1 tablet days 2-5    OME (otitis media with effusion), right       ENSKYCE 0.15-30 MG-MCG per tablet   Generic drug:  desogestrel-ethinyl estradiol     84 tablet    TAKE ONE TABLET BY MOUTH ONCE DAILY    Metrorrhagia       fluticasone 50 MCG/ACT spray    FLONASE    16 g    Spray 1-2 sprays into both nostrils daily    Acute serous otitis media of left ear, recurrence not specified       hydrOXYzine 25 MG tablet    ATARAX    40 tablet    Take 1 tablet (25 mg) by mouth every 6 hours as needed for anxiety    Anxiety attack       ibuprofen 800 MG tablet    ADVIL/MOTRIN    60 tablet    Take 1 tablet (800 mg) by mouth every 8 hours as needed for moderate pain    Abdominal pain, left lower quadrant       ketoconazole 2 % shampoo    NIZORAL    120 mL    Apply to torso rash and leave on for 5 minutes, then wash  off.  This can be done 3 days in a row.    Tinea versicolor

## 2018-09-27 NOTE — PROGRESS NOTES
SUBJECTIVE:   Sebastian Schuster is a 37 year old female who presents to clinic today for the following health issues:      Ear pain       Duration: started last night     Description (location/character/radiation): right ear     Intensity:  moderate    Accompanying signs and symptoms: ear crackles, feels like water is in the ear, hurts to chew and open her jaw, right side of face is numb, no sinus pressure, no fevers     History (similar episodes/previous evaluation): None    Precipitating or alleviating factors: None    Therapies tried and outcome: None            Problem list and histories reviewed & adjusted, as indicated.  Additional history: as documented    Patient Active Problem List   Diagnosis     Contraceptive management     CARDIOVASCULAR SCREENING; LDL GOAL LESS THAN 160     Adjustment disorder with disturbance of emotion     Obesity (BMI 35.0-39.9 without comorbidity)     Panic disorder without agoraphobia     Morbid obesity (H)     Past Surgical History:   Procedure Laterality Date     C/SECTION, LOW TRANSVERSE  3-    , Low Transverse     C/SECTION, LOW TRANSVERSE       COLONOSCOPY  2013    Procedure: COLONOSCOPY;  Colonoscopy  ;  Surgeon: Jorge Underwood MD;  Location: [facility]     GALLBLADDER SURGERY         Social History   Substance Use Topics     Smoking status: Never Smoker     Smokeless tobacco: Never Used     Alcohol use No      Comment: rare     Family History   Problem Relation Age of Onset     Cancer Maternal Grandmother      Circulatory Mother 43     carotid and leg stents         Current Outpatient Prescriptions   Medication Sig Dispense Refill     azithromycin (ZITHROMAX Z-GUERITA) 250 MG tablet Take 2 tablets on day 1 and then take 1 tablet days 2-5 6 tablet 0     ENSKYCE 0.15-30 MG-MCG per tablet TAKE ONE TABLET BY MOUTH ONCE DAILY 84 tablet 3     fluticasone (FLONASE) 50 MCG/ACT spray Spray 1-2 sprays into both nostrils daily (Patient not taking: Reported on  "9/27/2018) 16 g 0     hydrOXYzine (ATARAX) 25 MG tablet Take 1 tablet (25 mg) by mouth every 6 hours as needed for anxiety (Patient not taking: Reported on 9/27/2018) 40 tablet 0     ibuprofen (ADVIL/MOTRIN) 800 MG tablet Take 1 tablet (800 mg) by mouth every 8 hours as needed for moderate pain (Patient not taking: Reported on 9/27/2018) 60 tablet 1     ketoconazole (NIZORAL) 2 % shampoo Apply to torso rash and leave on for 5 minutes, then wash off.  This can be done 3 days in a row. (Patient not taking: Reported on 9/27/2018) 120 mL 1     Allergies   Allergen Reactions     Augmentin Rash     Rash      Penicillins      See augmentin reaction      Lavender Oil Rash     Labs reviewed in EPIC    Reviewed and updated as needed this visit by clinical staff  Tobacco  Allergies  Meds  Problems  Med Hx  Surg Hx  Fam Hx  Soc Hx        Reviewed and updated as needed this visit by Provider  Allergies  Meds  Problems         ROS:  Constitutional, HEENT, cardiovascular, pulmonary, GI, , musculoskeletal, neuro, skin, endocrine and psych systems are negative, except as otherwise noted.    OBJECTIVE:     /76  Pulse 96  Temp 98.7  F (37.1  C) (Tympanic)  Resp 20  Ht 5' 0.24\" (1.53 m)  Wt 216 lb 3.2 oz (98.1 kg)  LMP 09/10/2018 (Exact Date)  SpO2 98%  BMI 41.89 kg/m2  Body mass index is 41.89 kg/(m^2).   GENERAL: healthy, alert and no distress, nontoxic in appearance  EYES: Eyes grossly normal to inspection, PERRL and conjunctivae and sclerae normal  HENT: ear canals and TM's  Intact bilaterally with left normal and right pink with effusion, nose and mouth without ulcers or lesions  NECK: no adenopathy, supple with full ROM  RESP: lungs clear to auscultation - no rales, rhonchi or wheezes  CV: regular rate and rhythm, normal S1 S2, no S3 or S4, no murmur, click or rub, no peripheral edema   ABDOMEN: soft, nontender, no hepatosplenomegaly, no masses and bowel sounds normal  MS: no gross musculoskeletal " defects noted, no edema  No rash    Diagnostic Test Results:  No results found for this or any previous visit (from the past 24 hour(s)).    ASSESSMENT/PLAN:     Problem List Items Addressed This Visit     None      Visit Diagnoses     OME (otitis media with effusion), right    -  Primary    Relevant Medications    azithromycin (ZITHROMAX Z-GUERITA) 250 MG tablet               Patient Instructions   Increase rest and fluids. Tylenol and/or Ibuprofen for comfort. Cool mist vaporizer. If your symptoms worsen or do not resolve follow up with your primary care provider in 1 week and sooner if needed.        Indications for emergent return to emergency department discussed with patient, who verbalized good understanding and agreement.  Patient understands the limitations of today's evaluation.           Middle Ear Infection (Adult)  You have an infection of the middle ear, the space behind the eardrum. This is also called acute otitis media (AOM). Sometimes it is caused by the common cold. This is because congestion can block the internal passage (eustachian tube) that drains fluid from the middle ear. When the middle ear fills with fluid, bacteria can grow there and cause an infection. Oral antibiotics are used to treat this illness, not ear drops. Symptoms usually start to improve within 1 to 2 days of treatment.    Home care  The following are general care guidelines:    Finish all of the antibiotic medicine given, even though you may feel better after the first few days.    You may use over-the-counter medicine, such as acetaminophen or ibuprofen, to control pain and fever, unless something else was prescribed. If you have chronic liver or kidney disease or have ever had a stomach ulcer or gastrointestinal bleeding, talk with your healthcare provider before using these medicines. Do not give aspirin to anyone under 18 years of age who has a fever. It may cause severe illness or death.  Follow-up care  Follow up with your  healthcare provider, or as advised, in 2 weeks if all symptoms have not gotten better, or if hearing doesn't go back to normal within 1 month.  When to seek medical advice  Call your healthcare provider right away if any of these occur:    Ear pain gets worse or does not improve after 3 days of treatment    Unusual drowsiness or confusion    Neck pain, stiff neck, or headache    Fluid or blood draining from the ear canal    Fever of 100.4 F (38 C) or as advised     Seizure  Date Last Reviewed: 6/1/2016 2000-2017 [employer]. [address] [place] [address]. All rights reserved. This information is not intended as a substitute for professional medical care. Always follow your healthcare professional's instructions.            JOSE MANUEL [facility]

## 2018-12-06 ENCOUNTER — OFFICE VISIT (OUTPATIENT)
Dept: FAMILY MEDICINE | Facility: CLINIC | Age: 38
End: 2018-12-06
Payer: COMMERCIAL

## 2018-12-06 VITALS
HEART RATE: 80 BPM | WEIGHT: 219 LBS | DIASTOLIC BLOOD PRESSURE: 84 MMHG | TEMPERATURE: 99.5 F | SYSTOLIC BLOOD PRESSURE: 138 MMHG | RESPIRATION RATE: 16 BRPM | BODY MASS INDEX: 43 KG/M2 | HEIGHT: 60 IN

## 2018-12-06 DIAGNOSIS — F32.81 PMDD (PREMENSTRUAL DYSPHORIC DISORDER): Primary | ICD-10-CM

## 2018-12-06 DIAGNOSIS — K64.4 EXTERNAL HEMORRHOIDS: ICD-10-CM

## 2018-12-06 PROCEDURE — 99213 OFFICE O/P EST LOW 20 MIN: CPT | Performed by: NURSE PRACTITIONER

## 2018-12-06 RX ORDER — FLUOXETINE 10 MG/1
10 CAPSULE ORAL DAILY
Qty: 30 CAPSULE | Refills: 0 | Status: SHIPPED | OUTPATIENT
Start: 2018-12-06 | End: 2019-03-14

## 2018-12-06 ASSESSMENT — ANXIETY QUESTIONNAIRES
7. FEELING AFRAID AS IF SOMETHING AWFUL MIGHT HAPPEN: NOT AT ALL
2. NOT BEING ABLE TO STOP OR CONTROL WORRYING: NEARLY EVERY DAY
5. BEING SO RESTLESS THAT IT IS HARD TO SIT STILL: SEVERAL DAYS
6. BECOMING EASILY ANNOYED OR IRRITABLE: MORE THAN HALF THE DAYS
1. FEELING NERVOUS, ANXIOUS, OR ON EDGE: MORE THAN HALF THE DAYS
3. WORRYING TOO MUCH ABOUT DIFFERENT THINGS: NEARLY EVERY DAY
GAD7 TOTAL SCORE: 12

## 2018-12-06 ASSESSMENT — PATIENT HEALTH QUESTIONNAIRE - PHQ9
SUM OF ALL RESPONSES TO PHQ QUESTIONS 1-9: 12
5. POOR APPETITE OR OVEREATING: SEVERAL DAYS

## 2018-12-06 NOTE — PATIENT INSTRUCTIONS
1. PMDD (premenstrual dysphoric disorder)  Acute  - FLUoxetine (PROZAC) 10 MG capsule; Take 1 capsule (10 mg) by mouth daily X 7 days before your period  Dispense: 30 capsule; Refill: 0  Vitamin D3 5,000 IU daily [employer] brand  Vitamin B12 1,000 mg daily [employer] brand    2. External hemorrhoids  Acute  - hydrocortisone (ANUSOL-HC) 2.5 % cream; Place rectally 2 times daily as needed for hemorrhoids  Dispense: 30 g; Refill: 0      Treating Hemorrhoids: Self-Care    Follow your healthcare provider s advice about caring for your hemorrhoids at home. Some treatments help relieve symptoms right away. Others involve making changes in your diet and exercise habits. These can help ease constipation and prevent hemorrhoid symptoms from coming back.  Relieving symptoms  Your healthcare provider may prescribe anti-inflammatory medicine to help ease your symptoms. The following tips will also help relieve pain and swelling.    Take sitz baths. Taking a sitz bath means sitting in a few inches of warm bath water. Soaking for 10 minutes twice a day can provide welcome relief from painful hemorrhoids. It can also help the area stay clean.    Develop good bowel habits. Use the bathroom when you need to. Don t ignore the urge to move your bowels. This can lead to constipation, hard stools, and straining. Also, don t read while on the toilet. Sit only as long as needed. Wipe gently with soft, unscented toilet tissue or baby wipes.    Use ice packs. Placing an ice pack on a thrombosed external hemorrhoid can help relieve pain right away. It will also help reduce the blood clot. Use the ice for 15 to 20 minutes at a time. Keep a cloth between the ice and your skin to prevent skin damage.    Use other measures. Laxatives and enemas can help ease constipation. But use them only on your healthcare provider s advice. For symptom relief, try using cotton pads soaked in witch hazel. These are available at most drugstores.  Over-the-counter hemorrhoid ointments and petroleum jelly can also provide relief.  Add fiber to your diet  Adding fiber to your diet can help relieve constipation by making stools softer and easier to pass. To increase your fiber intake, your healthcare provider may recommend a bulking agent, such as psyllium. This is a high-fiber supplement available at most grocery stores and drugstores. Eating more fiber-rich foods will also help. There are two types of fiber:    Insoluble fiber is the main ingredient in bulking agents. It s also found in foods such as wheat bran, whole-grain breads, fresh fruits, and vegetables.    Soluble fiber is found in foods such as oat bran. Although soluble fiber is good for you, it may not ease constipation as much as foods high in insoluble fiber.  Drink more water  Along with a high-fiber diet, drinking more water can help ease constipation. This is because insoluble fiber absorbs water, making stools soft and bulky. Be sure to drink plenty of water throughout the day. Drinking fruit juices, such as prune juice or apple juice, can also help prevent constipation.  Get more exercise  Regular exercise aids digestion and helps prevent constipation. It s also great for your health. So talk with your healthcare provider about starting an exercise program. Low-impact activities, such as swimming or walking, are good places to start. Take it easy at first. And remember to drink plenty of water when you exercise.  High-fiber foods  High-fiber foods offer many benefits. By making your stools softer, they help heal and prevent swollen hemorrhoids. They may also help reduce the risk of colon and rectal cancer. Best of all, they re usually low in calories and taste great. Here are some examples of fiber-rich foods.    Whole grains, such as wheat bran, corn bran, and brown rice.    Vegetables, especially carrots, broccoli, cabbage, and peas.    Fruits, such as apples, bananas, raisins, peaches, and  pears.    Nuts and legumes, especially peanuts, lentils, and kidney beans.  Easy ways to add fiber  The tips below offer some simple ways to add more high-fiber foods to your meals.    Start your day with a high-fiber breakfast. Eat a wheat bran cereal along with a sliced banana. Or, try peanut butter on whole-wheat toast.    Eat carrot sticks for snacks. They re easy to prepare, taste great, and are low in calories.    Use whole-grain breads instead of white bread for sandwiches.    Eat fruits for treats. Try an apple and some raisins instead of a candy bar.   Date Last Reviewed: 7/1/2016 2000-2018 [employer]. [address] [place] [address], [place] [address]. All rights reserved. This information is not intended as a substitute for professional medical care. Always follow your healthcare professional's instructions.        Managing PMS: Lifestyle Changes    Coping with PMS takes energy. But, PMS symptoms can make you feel like you don t have the strength to cope. The trick is to work helpful strategies into your daily life. Be active during the day and get enough sleep at night. Take time to relax. And don t be afraid to ask for support.  Being active    Activity raises the amount of oxygen in your body. This makes you feel better and gives you more energy. Exercise may also raise serotonin levels. For best results:  ? Try aerobic activities, such as walking, jogging, biking, swimming, or yoga.  ? Exercise for 30 minutes, most days of the week. If this seems like too much, start with 10 minutes a day and work your way up.  ? Find ways to fit activity into your day. Try taking the stairs instead of the elevator.  Sleeping well  When you re tired, PMS symptoms can be harder to cope with. Aim for at least 8 hours of sleep a night. To sleep better:    Follow a routine before bed. For instance, brush your teeth, read for half an hour, and turn out the lights at 10 p.m. every night.    Pull down window shades  and keep pets out of the bedroom. If you re a light sleeper, try wearing earplugs and an eye mask to block out noise and light.  Taking time to relax  Being relaxed can give you the energy to deal with life s ups and downs. This makes even PMS symptoms easier to cope with. Learn to relax through simple techniques you can do anytime, anywhere. If you think you re too busy, start with just 5 minutes a day. Try:    Taking in a slow, deep breath through your nose. Hold it for 5 counts, then exhale through your mouth. Repeat this 3 times.    Picturing yourself in a peaceful place, such as the countryside. Explore with your mind. Hear birds. Smell freshly cut grass. Enjoy a mental vacation.    Stretching to relax muscles and reduce aches. (If you have back problems, ask your healthcare provider about stretches that are safe for you.)  Finding support  You don t have to deal with PMS alone. To help you cope:    Talk to family, friends, and coworkers.    Let them know how they can help when you re dealing with PMS symptoms.    Chat with female friends. Support each other. You may learn some new coping strategies.    Join a support group for women with PMS. Or try a stress management group. Ask your healthcare provider for resources.  Date Last Reviewed: 2/1/2017 2000-2018 [employer]. [address] [place] [address], [place] [address]. All rights reserved. This information is not intended as a substitute for professional medical care. Always follow your healthcare professional's instructions.        Managing PMS: Diet and Nutrition     Nutrients in fresh fruits and vegetables can help you manage PMS.     Maintaining a healthy diet helps your body counter PMS. Certain foods boost serotonin levels and give you the energy to cope with symptoms. Other foods can be avoided to ease symptoms.  About supplements  Ask your healthcare provider about supplements before trying them.   Benefits of a balanced diet  To counter PMS  "symptoms, maintain a balanced diet. Eat foods from all the food groups: dairy, grains, fruits and vegetables, and protein. When planning meals, know that:    Calcium may ease mood swings, headache, bloating, and irritability. It's found in dairy products such as milk, cheese, and yogurt. Some juices, breads, cereals, and soy products have calcium added (fortified).    Magnesium may relieve bloating and breast tenderness. It's found in many foods, including fresh fruits and vegetables. To help your body get enough magnesium, eat 5 or more servings of a variety of fruits and vegetables a day.    Vitamin B6 helps the body use serotonin, thereby helping to ease depression. It's found in chicken, fish, potatoes, eggs, and carrots.    Vitamin E may reduce headache and breast tenderness. It's found in nuts such as almonds, peanuts, and hazelnuts. It's also found in green leafy vegetables.  \"Good mood\" foods  Eating foods high in carbohydrates (carbs) and fiber can help you manage PMS. That's because carbs raise serotonin levels. Carbs are also your body's main source of energy. To help keep energy and serotonin levels steady, eat small amounts throughout the day. High-fiber carbs include:    Whole-grain foods. Brown rice, whole-wheat pasta, whole-grain bread, and buckwheat noodles are good choices.    Fresh fruits and vegetables, especially when eaten unpeeled.    Beans and legumes, such as kidney beans, peas, and lentils.  Foods to limit  Some foods can make PMS symptoms worse. Know that:    Salt can cause bloating. Since canned vegetables are often high in salt, buy fresh instead. Flavor with herbs, lemon, or salt-free seasonings.    Sugar is a carb that provides only short bursts of energy. If you crave sugar, choose a food that's also high in fiber, like an unpeeled apple or a bran muffin.    Caffeine can disrupt sleep, which makes symptoms harder to cope with. Caffeine can also cause breast tenderness. Try to limit " chocolate and caffeinated drinks, such as coffee or soda.    Alcohol can make you feel depressed and can disrupt sleep. Many kinds of alcohol are also high in sugar. You may try limiting the amount of alcohol you drink.  Date Last Reviewed: 3/1/2017    6089-5692 [employer]. [address] [place] [address], [place] [address]. All rights reserved. This information is not intended as a substitute for professional medical care. Always follow your healthcare professional's instructions.

## 2018-12-06 NOTE — MR AVS SNAPSHOT
After Visit Summary   12/6/2018    Sebastian Schuster    MRN: [id number]           Patient Information     Date Of Birth          1980        Visit Information        Provider Department      12/6/2018 3:00 PM Karyna Stockton, CNP [facility]        Today's Diagnoses     PMDD (premenstrual dysphoric disorder)    -  1    External hemorrhoids          Care Instructions    1. PMDD (premenstrual dysphoric disorder)  Acute  - FLUoxetine (PROZAC) 10 MG capsule; Take 1 capsule (10 mg) by mouth daily X 7 days before your period  Dispense: 30 capsule; Refill: 0  Vitamin D3 5,000 IU daily [employer] Made brand  Vitamin B12 1,000 mg daily Nature Made brand    2. External hemorrhoids  Acute  - hydrocortisone (ANUSOL-HC) 2.5 % cream; Place rectally 2 times daily as needed for hemorrhoids  Dispense: 30 g; Refill: 0      Treating Hemorrhoids: Self-Care    Follow your healthcare provider s advice about caring for your hemorrhoids at home. Some treatments help relieve symptoms right away. Others involve making changes in your diet and exercise habits. These can help ease constipation and prevent hemorrhoid symptoms from coming back.  Relieving symptoms  Your healthcare provider may prescribe anti-inflammatory medicine to help ease your symptoms. The following tips will also help relieve pain and swelling.    Take sitz baths. Taking a sitz bath means sitting in a few inches of warm bath water. Soaking for 10 minutes twice a day can provide welcome relief from painful hemorrhoids. It can also help the area stay clean.    Develop good bowel habits. Use the bathroom when you need to. Don t ignore the urge to move your bowels. This can lead to constipation, hard stools, and straining. Also, don t read while on the toilet. Sit only as long as needed. Wipe gently with soft, unscented toilet tissue or baby wipes.    Use ice packs. Placing an ice pack on a thrombosed external hemorrhoid can help relieve pain  right away. It will also help reduce the blood clot. Use the ice for 15 to 20 minutes at a time. Keep a cloth between the ice and your skin to prevent skin damage.    Use other measures. Laxatives and enemas can help ease constipation. But use them only on your healthcare provider s advice. For symptom relief, try using cotton pads soaked in witch hazel. These are available at most drugstores. Over-the-counter hemorrhoid ointments and petroleum jelly can also provide relief.  Add fiber to your diet  Adding fiber to your diet can help relieve constipation by making stools softer and easier to pass. To increase your fiber intake, your healthcare provider may recommend a bulking agent, such as psyllium. This is a high-fiber supplement available at most grocery stores and drugstores. Eating more fiber-rich foods will also help. There are two types of fiber:    Insoluble fiber is the main ingredient in bulking agents. It s also found in foods such as wheat bran, whole-grain breads, fresh fruits, and vegetables.    Soluble fiber is found in foods such as oat bran. Although soluble fiber is good for you, it may not ease constipation as much as foods high in insoluble fiber.  Drink more water  Along with a high-fiber diet, drinking more water can help ease constipation. This is because insoluble fiber absorbs water, making stools soft and bulky. Be sure to drink plenty of water throughout the day. Drinking fruit juices, such as prune juice or apple juice, can also help prevent constipation.  Get more exercise  Regular exercise aids digestion and helps prevent constipation. It s also great for your health. So talk with your healthcare provider about starting an exercise program. Low-impact activities, such as swimming or walking, are good places to start. Take it easy at first. And remember to drink plenty of water when you exercise.  High-fiber foods  High-fiber foods offer many benefits. By making your stools softer, they  help heal and prevent swollen hemorrhoids. They may also help reduce the risk of colon and rectal cancer. Best of all, they re usually low in calories and taste great. Here are some examples of fiber-rich foods.    Whole grains, such as wheat bran, corn bran, and brown rice.    Vegetables, especially carrots, broccoli, cabbage, and peas.    Fruits, such as apples, bananas, raisins, peaches, and pears.    Nuts and legumes, especially peanuts, lentils, and kidney beans.  Easy ways to add fiber  The tips below offer some simple ways to add more high-fiber foods to your meals.    Start your day with a high-fiber breakfast. Eat a wheat bran cereal along with a sliced banana. Or, try peanut butter on whole-wheat toast.    Eat carrot sticks for snacks. They re easy to prepare, taste great, and are low in calories.    Use whole-grain breads instead of white bread for sandwiches.    Eat fruits for treats. Try an apple and some raisins instead of a candy bar.   Date Last Reviewed: 7/1/2016 2000-2018 [employer]. [address] [place] [address]. All rights reserved. This information is not intended as a substitute for professional medical care. Always follow your healthcare professional's instructions.        Managing PMS: Lifestyle Changes    Coping with PMS takes energy. But, PMS symptoms can make you feel like you don t have the strength to cope. The trick is to work helpful strategies into your daily life. Be active during the day and get enough sleep at night. Take time to relax. And don t be afraid to ask for support.  Being active    Activity raises the amount of oxygen in your body. This makes you feel better and gives you more energy. Exercise may also raise serotonin levels. For best results:  ? Try aerobic activities, such as walking, jogging, biking, swimming, or yoga.  ? Exercise for 30 minutes, most days of the week. If this seems like too much, start with 10 minutes a day and work your  way up.  ? Find ways to fit activity into your day. Try taking the stairs instead of the elevator.  Sleeping well  When you re tired, PMS symptoms can be harder to cope with. Aim for at least 8 hours of sleep a night. To sleep better:    Follow a routine before bed. For instance, brush your teeth, read for half an hour, and turn out the lights at 10 p.m. every night.    Pull down window shades and keep pets out of the bedroom. If you re a light sleeper, try wearing earplugs and an eye mask to block out noise and light.  Taking time to relax  Being relaxed can give you the energy to deal with life s ups and downs. This makes even PMS symptoms easier to cope with. Learn to relax through simple techniques you can do anytime, anywhere. If you think you re too busy, start with just 5 minutes a day. Try:    Taking in a slow, deep breath through your nose. Hold it for 5 counts, then exhale through your mouth. Repeat this 3 times.    Picturing yourself in a peaceful place, such as the countryside. Explore with your mind. Hear birds. Smell freshly cut grass. Enjoy a mental vacation.    Stretching to relax muscles and reduce aches. (If you have back problems, ask your healthcare provider about stretches that are safe for you.)  Finding support  You don t have to deal with PMS alone. To help you cope:    Talk to family, friends, and coworkers.    Let them know how they can help when you re dealing with PMS symptoms.    Chat with female friends. Support each other. You may learn some new coping strategies.    Join a support group for women with PMS. Or try a stress management group. Ask your healthcare provider for resources.  Date Last Reviewed: 2/1/2017 2000-2018 [employer]. [address] [place] [address], [place] [address]. All rights reserved. This information is not intended as a substitute for professional medical care. Always follow your healthcare professional's instructions.        Managing PMS: Diet and  "Nutrition     Nutrients in fresh fruits and vegetables can help you manage PMS.     Maintaining a healthy diet helps your body counter PMS. Certain foods boost serotonin levels and give you the energy to cope with symptoms. Other foods can be avoided to ease symptoms.  About supplements  Ask your healthcare provider about supplements before trying them.   Benefits of a balanced diet  To counter PMS symptoms, maintain a balanced diet. Eat foods from all the food groups: dairy, grains, fruits and vegetables, and protein. When planning meals, know that:    Calcium may ease mood swings, headache, bloating, and irritability. It's found in dairy products such as milk, cheese, and yogurt. Some juices, breads, cereals, and soy products have calcium added (fortified).    Magnesium may relieve bloating and breast tenderness. It's found in many foods, including fresh fruits and vegetables. To help your body get enough magnesium, eat 5 or more servings of a variety of fruits and vegetables a day.    Vitamin B6 helps the body use serotonin, thereby helping to ease depression. It's found in chicken, fish, potatoes, eggs, and carrots.    Vitamin E may reduce headache and breast tenderness. It's found in nuts such as almonds, peanuts, and hazelnuts. It's also found in green leafy vegetables.  \"Good mood\" foods  Eating foods high in carbohydrates (carbs) and fiber can help you manage PMS. That's because carbs raise serotonin levels. Carbs are also your body's main source of energy. To help keep energy and serotonin levels steady, eat small amounts throughout the day. High-fiber carbs include:    Whole-grain foods. Brown rice, whole-wheat pasta, whole-grain bread, and buckwheat noodles are good choices.    Fresh fruits and vegetables, especially when eaten unpeeled.    Beans and legumes, such as kidney beans, peas, and lentils.  Foods to limit  Some foods can make PMS symptoms worse. Know that:    Salt can cause bloating. Since " canned vegetables are often high in salt, buy fresh instead. Flavor with herbs, lemon, or salt-free seasonings.    Sugar is a carb that provides only short bursts of energy. If you crave sugar, choose a food that's also high in fiber, like an unpeeled apple or a bran muffin.    Caffeine can disrupt sleep, which makes symptoms harder to cope with. Caffeine can also cause breast tenderness. Try to limit chocolate and caffeinated drinks, such as coffee or soda.    Alcohol can make you feel depressed and can disrupt sleep. Many kinds of alcohol are also high in sugar. You may try limiting the amount of alcohol you drink.  Date Last Reviewed: 3/1/2017    [id number] [employer]. [address] [place] [address], [place] [address]. All rights reserved. This information is not intended as a substitute for professional medical care. Always follow your healthcare professional's instructions.                Follow-ups after your visit        Follow-up notes from your care team     Return in about 2 months (around 2/6/2019) for recheck in 2 months regarding Prozac for PMDD.      Who to contact     If you have questions or need follow up information about today's clinic visit or your schedule please contact [facility] directly at [phone].  Normal or non-critical lab and imaging results will be communicated to you by [employer], letter or phone within 4 business days after the clinic has received the results. If you do not hear from us within 7 days, please contact the clinic through [employer] or phone. If you have a critical or abnormal lab result, we will notify you by phone as soon as possible.  Submit refill requests through [employer] or call your pharmacy and they will forward the refill request to us. Please allow 3 business days for your refill to be completed.          Additional Information About Your Visit        [employer] Information     [employer] gives you secure access to your electronic health record.  "If you see a primary care provider, you can also send messages to your care team and make appointments. If you have questions, please call your primary care clinic.  If you do not have a primary care provider, please call [phone] and they will assist you.        Care EveryWhere ID     This is your Care EveryWhere ID. This could be used by other organizations to access your [place] medical records  [id number]        Your Vitals Were     Pulse Temperature Respirations Height Last Period BMI (Body Mass Index)    80 99.5  F (37.5  C) (Tympanic) 16 5' 0.25\" (1.53 m) 12/06/2018 42.42 kg/m2       Blood Pressure from Last 3 Encounters:   12/06/18 138/84   09/27/18 110/76   06/29/18 128/80    Weight from Last 3 Encounters:   12/06/18 219 lb (99.3 kg)   09/27/18 216 lb 3.2 oz (98.1 kg)   06/29/18 218 lb (98.9 kg)              Today, you had the following     No orders found for display         Today's Medication Changes          These changes are accurate as of 12/6/18  3:50 PM.  If you have any questions, ask your nurse or doctor.               Start taking these medicines.        Dose/Directions    FLUoxetine 10 MG capsule   Commonly known as:  PROzac   Used for:  PMDD (premenstrual dysphoric disorder)   Started by:  Karyna Stockton CNP        Dose:  10 mg   Take 1 capsule (10 mg) by mouth daily X 7 days before your period   Quantity:  30 capsule   Refills:  0       hydrocortisone 2.5 % cream   Commonly known as:  ANUSOL-HC   Used for:  External hemorrhoids   Started by:  Karyna Stockton CNP        Place rectally 2 times daily as needed for hemorrhoids   Quantity:  30 g   Refills:  0            Where to get your medicines      These medications were sent to [facility] [address] [place] [address] [address] [facility] [address]     Phone:  [phone]     FLUoxetine 10 MG capsule    hydrocortisone 2.5 % cream                Primary Care Provider Office Phone # Fax #    Karyna " Awilda Stockton, [phone]       [address] [facility] [address]        Equal Access to Services     AMANDA LAMAS : Hadii aad ku hadazleandra Mally, wajobda luqkathy, qadelorista kainduda henrik, gaby sarahin hayaathien bojose tineo ryan rnakin. So [facility] [phone].    ATENCIÓN: Si habla español, tiene a south disposición servicios gratuitos de asistencia lingüística. Llame al [phone].    We comply with applicable federal civil rights laws and [place] laws. We do not discriminate on the basis of race, color, national origin, age, disability, sex, sexual orientation, or gender identity.            Thank you!     Thank you for choosing [facility]  for your care. Our goal is always to provide you with excellent care. Hearing back from our patients is one way we can continue to improve our services. Please take a few minutes to complete the written survey that you may receive in the mail after your visit with us. Thank you!             Your Updated Medication List - Protect others around you: Learn how to safely use, store and throw away your medicines at www.disposemymeds.org.          This list is accurate as of 12/6/18  3:50 PM.  Always use your most recent med list.                   Brand Name Dispense Instructions for use Diagnosis    ENSKYCE 0.15-30 MG-MCG tablet   Generic drug:  desogestrel-ethinyl estradiol     84 tablet    TAKE ONE TABLET BY MOUTH ONCE DAILY    Metrorrhagia       FLUoxetine 10 MG capsule    PROzac    30 capsule    Take 1 capsule (10 mg) by mouth daily X 7 days before your period    PMDD (premenstrual dysphoric disorder)       fluticasone 50 MCG/ACT nasal spray    FLONASE    16 g    Spray 1-2 sprays into both nostrils daily    Acute serous otitis media of left ear, recurrence not specified       hydrocortisone 2.5 % cream    ANUSOL-HC    30 g    Place rectally 2 times daily as needed for hemorrhoids    External hemorrhoids       hydrOXYzine 25 MG tablet     ATARAX    40 tablet    Take 1 tablet (25 mg) by mouth every 6 hours as needed for anxiety    Anxiety attack       ibuprofen 800 MG tablet    ADVIL/MOTRIN    60 tablet    Take 1 tablet (800 mg) by mouth every 8 hours as needed for moderate pain    Abdominal pain, left lower quadrant       ketoconazole 2 % external shampoo    NIZORAL    120 mL    Apply to torso rash and leave on for 5 minutes, then wash off.  This can be done 3 days in a row.    Tinea versicolor

## 2018-12-06 NOTE — NURSING NOTE
"Chief Complaint   Patient presents with     MOOD CHANGES     Derm Problem       Initial /84 (BP Location: Right arm, Patient Position: Chair, Cuff Size: Adult Large)  Pulse 80  Temp 99.5  F (37.5  C) (Tympanic)  Resp 16  Ht 5' 0.25\" (1.53 m)  Wt 219 lb (99.3 kg)  LMP 12/06/2018  BMI 42.42 kg/m2 Estimated body mass index is 42.42 kg/(m^2) as calculated from the following:    Height as of this encounter: 5' 0.25\" (1.53 m).    Weight as of this encounter: 219 lb (99.3 kg).    Patient presents to the clinic using No DME    Health Maintenance that is potentially due pending provider review:  Tetanus immunization    Pt declines to have immunization.    Is there anyone who you would like to be able to receive your results? No  If yes have patient fill out DANE    Bhavani Masters CMA        "

## 2018-12-06 NOTE — PROGRESS NOTES
SUBJECTIVE:   Sebastian Schuster is a 38 year old female who presents to clinic today for the following health issues:      HPI  Abnormal Mood Symptoms      Duration: Couple months, increasing in severity more recently    Description:   Depression: no  Anxiety: YES- mood swings  Panic attacks: YES- has had one     Accompanying signs and symptoms: see PHQ-9 and FIDENCIO scores    History (similar episodes/previous evaluation): None    Precipitating or alleviating factors: Pt. States she feels that her mood swings are worse around the time of her menstrual cycle- 1 week before, otherwise she is good.    Therapies tried and outcome: Takes Atarax on an occasional basis for anxiety.  PHQ-9 SCORE 5/1/2012 4/12/2018 12/6/2018   PHQ-9 Total Score 6 - -   PHQ-9 Total Score - 6 12     FIDENCIO-7 SCORE 4/7/2017 4/12/2018 12/6/2018   Total Score - - -   Total Score 9 (mild anxiety) - -   Total Score 9 7 12       Derm Problem      Duration: Almost a year    Description (location/character/radiation):  Right buttock/ Anus, itching    Intensity:  mild    History (similar episodes/previous evaluation): None    Therapies tried and outcome: None     HPI:   PCP:  Karyna Stockton, [facility] [phone]    Patient Active Problem List   Diagnosis     Contraceptive management     CARDIOVASCULAR SCREENING; LDL GOAL LESS THAN 160     Adjustment disorder with disturbance of emotion     Obesity (BMI 35.0-39.9 without comorbidity)     Panic disorder without agoraphobia     Morbid obesity (H)     Current Outpatient Prescriptions   Medication     ENSKYCE 0.15-30 MG-MCG per tablet     FLUoxetine (PROZAC) 10 MG capsule     fluticasone (FLONASE) 50 MCG/ACT spray     hydrocortisone (ANUSOL-HC) 2.5 % cream     hydrOXYzine (ATARAX) 25 MG tablet     ibuprofen (ADVIL/MOTRIN) 800 MG tablet     ketoconazole (NIZORAL) 2 % shampoo     No current facility-administered medications for this visit.        Health Maintenance Due   Topic Date Due     DEPRESSION ACTION PLAN   "11/23/2000     INFLUENZA VACCINE (1) 09/01/2018     TETANUS IMMUNIZATION (SYSTEM ASSIGNED)  09/03/2018     PHQ-9 Q6 MONTHS  10/12/2018       Reviewed and updated:  Tobacco  Allergies  Meds  Med Hx  Surg Hx  Fam Hx  Soc Hx     ROS:  Constitutional, HEENT, cardiovascular, pulmonary, gi and gu systems are negative, except as otherwise noted.    PHYSICAL EXAM:   /84 (BP Location: Right arm, Patient Position: Chair, Cuff Size: Adult Large)  Pulse 80  Temp 99.5  F (37.5  C) (Tympanic)  Resp 16  Ht 5' 0.25\" (1.53 m)  Wt 219 lb (99.3 kg)  LMP 12/06/2018  BMI 42.42 kg/m2  Body mass index is 42.42 kg/(m^2).  GENERAL APPEARANCE: healthy, alert and no distress, overweight  RESP: lungs clear to auscultation - no rales, rhonchi or wheezes  CV: regular rates and rhythm, normal S1 S2, no S3 or S4 and no murmur, click or rub  RECTUM: small external hemorrhoid, no rash or discoloration to the surrounding skin  MS: extremities normal- no gross deformities noted  PSYCH: mentation appears normal and affect normal/bright    ASSESSMENT & PLAN:     1. PMDD (premenstrual dysphoric disorder)  Acute  - FLUoxetine (PROZAC) 10 MG capsule; Take 1 capsule (10 mg) by mouth daily X 7 days before your period  Dispense: 30 capsule; Refill: 0  Vitamin D3 5,000 IU daily Nature Made brand  Vitamin B12 1,000 mg daily Nature Made brand   Could try Zoloft or Paxil or OC containing drospirenone 3 mg + EE 30 mcg (Maye)  Her insurance will only cover #21 tablets    2. External hemorrhoids  Acute  - hydrocortisone (ANUSOL-HC) 2.5 % cream; Place rectally 2 times daily as needed for hemorrhoids  Dispense: 30 g; Refill: 0      Treating Hemorrhoids: Self-Care    Follow your healthcare provider s advice about caring for your hemorrhoids at home. Some treatments help relieve symptoms right away. Others involve making changes in your diet and exercise habits. These can help ease constipation and prevent hemorrhoid symptoms from coming " back.  Relieving symptoms  Your healthcare provider may prescribe anti-inflammatory medicine to help ease your symptoms. The following tips will also help relieve pain and swelling.    Take sitz baths. Taking a sitz bath means sitting in a few inches of warm bath water. Soaking for 10 minutes twice a day can provide welcome relief from painful hemorrhoids. It can also help the area stay clean.    Develop good bowel habits. Use the bathroom when you need to. Don t ignore the urge to move your bowels. This can lead to constipation, hard stools, and straining. Also, don t read while on the toilet. Sit only as long as needed. Wipe gently with soft, unscented toilet tissue or baby wipes.    Use ice packs. Placing an ice pack on a thrombosed external hemorrhoid can help relieve pain right away. It will also help reduce the blood clot. Use the ice for 15 to 20 minutes at a time. Keep a cloth between the ice and your skin to prevent skin damage.    Use other measures. Laxatives and enemas can help ease constipation. But use them only on your healthcare provider s advice. For symptom relief, try using cotton pads soaked in witch hazel. These are available at most drugstores. Over-the-counter hemorrhoid ointments and petroleum jelly can also provide relief.  Add fiber to your diet  Adding fiber to your diet can help relieve constipation by making stools softer and easier to pass. To increase your fiber intake, your healthcare provider may recommend a bulking agent, such as psyllium. This is a high-fiber supplement available at most grocery stores and drugstores. Eating more fiber-rich foods will also help. There are two types of fiber:    Insoluble fiber is the main ingredient in bulking agents. It s also found in foods such as wheat bran, whole-grain breads, fresh fruits, and vegetables.    Soluble fiber is found in foods such as oat bran. Although soluble fiber is good for you, it may not ease constipation as much as foods  high in insoluble fiber.  Drink more water  Along with a high-fiber diet, drinking more water can help ease constipation. This is because insoluble fiber absorbs water, making stools soft and bulky. Be sure to drink plenty of water throughout the day. Drinking fruit juices, such as prune juice or apple juice, can also help prevent constipation.  Get more exercise  Regular exercise aids digestion and helps prevent constipation. It s also great for your health. So talk with your healthcare provider about starting an exercise program. Low-impact activities, such as swimming or walking, are good places to start. Take it easy at first. And remember to drink plenty of water when you exercise.  High-fiber foods  High-fiber foods offer many benefits. By making your stools softer, they help heal and prevent swollen hemorrhoids. They may also help reduce the risk of colon and rectal cancer. Best of all, they re usually low in calories and taste great. Here are some examples of fiber-rich foods.    Whole grains, such as wheat bran, corn bran, and brown rice.    Vegetables, especially carrots, broccoli, cabbage, and peas.    Fruits, such as apples, bananas, raisins, peaches, and pears.    Nuts and legumes, especially peanuts, lentils, and kidney beans.  Easy ways to add fiber  The tips below offer some simple ways to add more high-fiber foods to your meals.    Start your day with a high-fiber breakfast. Eat a wheat bran cereal along with a sliced banana. Or, try peanut butter on whole-wheat toast.    Eat carrot sticks for snacks. They re easy to prepare, taste great, and are low in calories.    Use whole-grain breads instead of white bread for sandwiches.    Eat fruits for treats. Try an apple and some raisins instead of a candy bar.   Date Last Reviewed: 7/1/2016 2000-2018 [employer]. [address] [facility], [place] [address]. All rights reserved. This information is not intended as a substitute for  professional medical care. Always follow your healthcare professional's instructions.        Managing PMS: Lifestyle Changes    Coping with PMS takes energy. But, PMS symptoms can make you feel like you don t have the strength to cope. The trick is to work helpful strategies into your daily life. Be active during the day and get enough sleep at night. Take time to relax. And don t be afraid to ask for support.  Being active    Activity raises the amount of oxygen in your body. This makes you feel better and gives you more energy. Exercise may also raise serotonin levels. For best results:  ? Try aerobic activities, such as walking, jogging, biking, swimming, or yoga.  ? Exercise for 30 minutes, most days of the week. If this seems like too much, start with 10 minutes a day and work your way up.  ? Find ways to fit activity into your day. Try taking the stairs instead of the elevator.  Sleeping well  When you re tired, PMS symptoms can be harder to cope with. Aim for at least 8 hours of sleep a night. To sleep better:    Follow a routine before bed. For instance, brush your teeth, read for half an hour, and turn out the lights at 10 p.m. every night.    Pull down window shades and keep pets out of the bedroom. If you re a light sleeper, try wearing earplugs and an eye mask to block out noise and light.  Taking time to relax  Being relaxed can give you the energy to deal with life s ups and downs. This makes even PMS symptoms easier to cope with. Learn to relax through simple techniques you can do anytime, anywhere. If you think you re too busy, start with just 5 minutes a day. Try:    Taking in a slow, deep breath through your nose. Hold it for 5 counts, then exhale through your mouth. Repeat this 3 times.    Picturing yourself in a peaceful place, such as the countryside. Explore with your mind. Hear birds. Smell freshly cut grass. Enjoy a mental vacation.    Stretching to relax muscles and reduce aches. (If you  have back problems, ask your healthcare provider about stretches that are safe for you.)  Finding support  You don t have to deal with PMS alone. To help you cope:    Talk to family, friends, and coworkers.    Let them know how they can help when you re dealing with PMS symptoms.    Chat with female friends. Support each other. You may learn some new coping strategies.    Join a support group for women with PMS. Or try a stress management group. Ask your healthcare provider for resources.  Date Last Reviewed: 2/1/2017 2000-2018 [employer]. [address] [place] [address]. All rights reserved. This information is not intended as a substitute for professional medical care. Always follow your healthcare professional's instructions.        Managing PMS: Diet and Nutrition     Nutrients in fresh fruits and vegetables can help you manage PMS.     Maintaining a healthy diet helps your body counter PMS. Certain foods boost serotonin levels and give you the energy to cope with symptoms. Other foods can be avoided to ease symptoms.  About supplements  Ask your healthcare provider about supplements before trying them.   Benefits of a balanced diet  To counter PMS symptoms, maintain a balanced diet. Eat foods from all the food groups: dairy, grains, fruits and vegetables, and protein. When planning meals, know that:    Calcium may ease mood swings, headache, bloating, and irritability. It's found in dairy products such as milk, cheese, and yogurt. Some juices, breads, cereals, and soy products have calcium added (fortified).    Magnesium may relieve bloating and breast tenderness. It's found in many foods, including fresh fruits and vegetables. To help your body get enough magnesium, eat 5 or more servings of a variety of fruits and vegetables a day.    Vitamin B6 helps the body use serotonin, thereby helping to ease depression. It's found in chicken, fish, potatoes, eggs, and carrots.    Vitamin E  "may reduce headache and breast tenderness. It's found in nuts such as almonds, peanuts, and hazelnuts. It's also found in green leafy vegetables.  \"Good mood\" foods  Eating foods high in carbohydrates (carbs) and fiber can help you manage PMS. That's because carbs raise serotonin levels. Carbs are also your body's main source of energy. To help keep energy and serotonin levels steady, eat small amounts throughout the day. High-fiber carbs include:    Whole-grain foods. Brown rice, whole-wheat pasta, whole-grain bread, and buckwheat noodles are good choices.    Fresh fruits and vegetables, especially when eaten unpeeled.    Beans and legumes, such as kidney beans, peas, and lentils.  Foods to limit  Some foods can make PMS symptoms worse. Know that:    Salt can cause bloating. Since canned vegetables are often high in salt, buy fresh instead. Flavor with herbs, lemon, or salt-free seasonings.    Sugar is a carb that provides only short bursts of energy. If you crave sugar, choose a food that's also high in fiber, like an unpeeled apple or a bran muffin.    Caffeine can disrupt sleep, which makes symptoms harder to cope with. Caffeine can also cause breast tenderness. Try to limit chocolate and caffeinated drinks, such as coffee or soda.    Alcohol can make you feel depressed and can disrupt sleep. Many kinds of alcohol are also high in sugar. You may try limiting the amount of alcohol you drink.  Date Last Reviewed: 3/1/2017    5405-5231 [employer]. [address] [place] [address], [place] [address]. All rights reserved. This information is not intended as a substitute for professional medical care. Always follow your healthcare professional's instructions.          Risks, benefits, side effects and rationale for treatment plan fully discussed with the patient and understanding expressed.    DAVIDE [facility]        "

## 2018-12-07 ASSESSMENT — ANXIETY QUESTIONNAIRES: GAD7 TOTAL SCORE: 12

## 2019-03-13 ENCOUNTER — MYC MEDICAL ADVICE (OUTPATIENT)
Dept: FAMILY MEDICINE | Facility: CLINIC | Age: 39
End: 2019-03-13

## 2019-03-13 NOTE — TELEPHONE ENCOUNTER
S-(situation): reddened rash like area to the neck    B-(background): patient is unsure, she thinks several weeks    A-(assessment): patient states this is not an allergic response, is breathing ok, swallowing and no swelling. States that the area is reddened on the neck only, not raised, not itchy. Has not come in contact with anything food or plant related, patient is not sure what has caused it.    R-(recommendations): Advised office visit to have it evaluated.  Patient is scheduled tomorrow with PCP.    STUART Shields

## 2019-03-14 ENCOUNTER — OFFICE VISIT (OUTPATIENT)
Dept: FAMILY MEDICINE | Facility: CLINIC | Age: 39
End: 2019-03-14
Payer: COMMERCIAL

## 2019-03-14 VITALS
RESPIRATION RATE: 16 BRPM | HEIGHT: 60 IN | HEART RATE: 80 BPM | SYSTOLIC BLOOD PRESSURE: 118 MMHG | DIASTOLIC BLOOD PRESSURE: 74 MMHG | WEIGHT: 225 LBS | TEMPERATURE: 98.9 F | BODY MASS INDEX: 44.17 KG/M2

## 2019-03-14 DIAGNOSIS — E66.01 MORBID OBESITY WITH BMI OF 40.0-44.9, ADULT (H): Chronic | ICD-10-CM

## 2019-03-14 DIAGNOSIS — B36.0 TINEA VERSICOLOR: Primary | ICD-10-CM

## 2019-03-14 PROBLEM — F41.0 PANIC DISORDER WITHOUT AGORAPHOBIA: Status: RESOLVED | Noted: 2017-04-07 | Resolved: 2019-03-14

## 2019-03-14 PROBLEM — K64.4 EXTERNAL HEMORRHOIDS: Status: ACTIVE | Noted: 2018-12-06

## 2019-03-14 PROBLEM — F32.81 PMDD (PREMENSTRUAL DYSPHORIC DISORDER): Status: RESOLVED | Noted: 2018-12-06 | Resolved: 2019-03-14

## 2019-03-14 PROCEDURE — 99213 OFFICE O/P EST LOW 20 MIN: CPT | Performed by: NURSE PRACTITIONER

## 2019-03-14 RX ORDER — KETOCONAZOLE 20 MG/ML
SHAMPOO TOPICAL
Qty: 120 ML | Refills: 1 | Status: SHIPPED | OUTPATIENT
Start: 2019-03-14 | End: 2020-04-15

## 2019-03-14 ASSESSMENT — ANXIETY QUESTIONNAIRES
6. BECOMING EASILY ANNOYED OR IRRITABLE: SEVERAL DAYS
1. FEELING NERVOUS, ANXIOUS, OR ON EDGE: MORE THAN HALF THE DAYS
GAD7 TOTAL SCORE: 8
2. NOT BEING ABLE TO STOP OR CONTROL WORRYING: MORE THAN HALF THE DAYS
7. FEELING AFRAID AS IF SOMETHING AWFUL MIGHT HAPPEN: SEVERAL DAYS
4. TROUBLE RELAXING: NOT AT ALL
3. WORRYING TOO MUCH ABOUT DIFFERENT THINGS: MORE THAN HALF THE DAYS
GAD7 TOTAL SCORE: 8
GAD7 TOTAL SCORE: 8
5. BEING SO RESTLESS THAT IT IS HARD TO SIT STILL: NOT AT ALL
7. FEELING AFRAID AS IF SOMETHING AWFUL MIGHT HAPPEN: SEVERAL DAYS

## 2019-03-14 ASSESSMENT — MIFFLIN-ST. JEOR: SCORE: 1636.06

## 2019-03-14 ASSESSMENT — PATIENT HEALTH QUESTIONNAIRE - PHQ9
SUM OF ALL RESPONSES TO PHQ QUESTIONS 1-9: 7
SUM OF ALL RESPONSES TO PHQ QUESTIONS 1-9: 7
10. IF YOU CHECKED OFF ANY PROBLEMS, HOW DIFFICULT HAVE THESE PROBLEMS MADE IT FOR YOU TO DO YOUR WORK, TAKE CARE OF THINGS AT HOME, OR GET ALONG WITH OTHER PEOPLE: NOT DIFFICULT AT ALL

## 2019-03-14 NOTE — PROGRESS NOTES
SUBJECTIVE:   Sebastian Schuster is a 38 year old female who presents to clinic today for the following health issues:  Stopped taking Prozac after 1 month     Derm issue      Duration: 1 week    Description (location/character/radiation): Lt side of neck     Intensity:  Denies pain or itch    Accompanying signs and symptoms: none    History (similar episodes/previous evaluation): None    Precipitating or alleviating factors: None    Therapies tried and outcome: None   Nizoral 2% shampoo in the past for tinea versicolor    Depression Followup: PMDD    Status since last visit: Improved Out bursts have stopped    See PHQ-9 for current symptoms.  Other associated symptoms: None    Complicating factors:   Significant life event:  No   Current substance abuse:  None  Anxiety or Panic symptoms:  No    PHQ 4/12/2018 12/6/2018 3/14/2019   PHQ-9 Total Score 6 12 7   Q9: Suicide Ideation Several days Not at all Not at all   F/U: Thoughts of suicide or self-harm No - -   F/U: Safety concerns No - -     FIDENCIO-7 SCORE 4/12/2018 12/6/2018 3/14/2019   Total Score - - -   Total Score - - 8 (mild anxiety)   Total Score 7 12 8     PHQ-9  English  PHQ-9   Any Language  Suicide Assessment Five-step Evaluation and Treatment (SAFE-T)    Amount of exercise or physical activity: usually    Problems taking medications regularly: NA    Medication side effects: not applicable    Diet: regular (no restrictions)        PHQ-9 SCORE 4/12/2018 12/6/2018 3/14/2019   PHQ-9 Total Score - - -   PHQ-9 Total Score MyChart - - 7 (Mild depression)   PHQ-9 Total Score 6 12 7     Obesity  Exercise: 2-3 times/week  Diet: unhealthy    PCP   Karyna Stockton, [facility] [phone]    Health Maintenance        Health Maintenance Due   Topic Date Due     DEPRESSION ACTION PLAN  11/23/2000     PREVENTIVE CARE VISIT  09/28/2011     INFLUENZA VACCINE (1) 09/01/2018     DTAP/TDAP/TD IMMUNIZATION (2 - Td) 09/03/2018     PAP Q5 YEARS  07/01/2019     HPV Q5 YEARS (Complete  "with PAP)  07/01/2019       HPI        Patient Active Problem List   Diagnosis     Contraceptive management     CARDIOVASCULAR SCREENING; LDL GOAL LESS THAN 160     Adjustment disorder with disturbance of emotion     Obesity (BMI 35.0-39.9 without comorbidity)     Panic disorder without agoraphobia     Morbid obesity (H)     PMDD (premenstrual dysphoric disorder)     External hemorrhoids     Current Outpatient Medications   Medication     ENSKYCE 0.15-30 MG-MCG tablet     hydrOXYzine (ATARAX) 25 MG tablet     FLUoxetine (PROZAC) 10 MG capsule     fluticasone (FLONASE) 50 MCG/ACT spray     ketoconazole (NIZORAL) 2 % shampoo     No current facility-administered medications for this visit.        Reviewed and updated:  Tobacco  Allergies  Meds  Med Hx  Surg Hx  Fam Hx  Soc Hx     ROS:  Constitutional, HEENT, cardiovascular, pulmonary, gi and gu systems are negative, except as otherwise noted.    PHYSICAL EXAM   /74 (BP Location: Right arm, Patient Position: Sitting, Cuff Size: Adult Large)   Pulse 80   Temp 98.9  F (37.2  C) (Tympanic)   Resp 16   Ht 1.53 m (5' 0.25\")   Wt 102.1 kg (225 lb)   LMP 02/26/2019   BMI 43.58 kg/m    Body mass index is 43.58 kg/m .  GENERAL APPEARANCE: healthy, alert, no distress and over weight  RESP: lungs clear to auscultation - no rales, rhonchi or wheezes  CV: regular rates and rhythm, normal S1 S2, no S3 or S4 and no murmur, click or rub  SKIN: left side of neck has a patchy rash with the tinea versicolor appearance. History of this in 2015.   PSYCH: mentation appears normal and affect normal/bright    ASSESSMENT & PLAN   1. Tinea versicolor  Acute, stable  - ketoconazole (NIZORAL) 2 % external shampoo; Apply to torso rash and leave on for 5 minutes, then wash off.  This can be done 3 days in a row.  Dispense: 120 mL; Refill: 1    2. Morbid obesity with BMI of 40.0-44.9, adult (H)  Acute, stable      HOW TO BE HEALTHIER    Tools to use: www.sparkpeople.com- FREE " website that helps you with food choices, and exercise. You can talk with people, talk to trainers and dieticians.    Increase your water intake daily to 6 glasses     Purchase a pedometer that will monitor how many steps you take (10, 000 daily is a goal for everyone)- these are sometimes included in smart phones.    Use meal replacements such as Aliyah's meals, Lean Cuisines, Healthy Choice, Smart Ones, Weight Watchers Meals, and Slim Fast and Glucerna shakes and supplement with fresh fruits and vegetables    Please drink a lot of water daily. Most people typically need about 2 liters of water daily and more if they are exercising, have a large weight, or have a fever or illness. Add Crystal Light for flavoring if desired. But no pop with calories in it.    Keep a food journal of what you eat, calories in what you eat, and mood. You will see where you are getting the majority of your calories. Bring the journal with you to your nutrition appointment (if you are being referred).    Consider using applications for smart phones such as CalastonenessPal, Figure 1, SGX PharmaceuticalsRecipes, LoseIt, Tap&Track, and RelaxM.    Focus on wet volumetrics, meaning, eat more foods that are high in water and fiber such as fruits and vegetables in order to get that full feeling. These are also good for your overall health as well.    Check out Dr. Lesvia Stearns from [facility] - she has cookbooks with low calorie volumetric recipes    You can try Let's Dish to help you prepare meals for you and your family. Often times, the caloric and nutrition data and serving sizes are available for this food. This can be a time saving maneuver. The website can give you more information http://www.[employer] LLC.HubPages/    [employer] has Let's Dish & fresh low calorie salads    Check out Hello Fresh at https://www.Flash Networks.HubPages/food-boxes/classic-box/    Try Cooking Light recipes for low calorie meal preparation and planning    Other food plan options you can  search for on the internet and check out include: Paul LOMELI, [facility]    Here are 10 ways to get you moving more often:   1. Be less efficient. People typically try to think of ways to make daily tasks easier. But if we make them harder, we can get more exercise, says Brooke Gabriel, MS, of [employer], a , , and spokeswoman for the [place] on Exercise (ACE).  Bring in the groceries from your car one bag at a time so you have to make several trips,  Harvey says.  Put the laundry away a few items at a time, rather than carrying it up in a basket.    2. Shun labor-saving devices. Wash the car by hand rather than taking it to the car wash.  It takes about an hour and a half to do a good job, and in the meantime you ve gotten great exercise,  Harvey says. Use a push mower rather than a riding mower to groom your lawn.   3. Going somewhere? Take the long way. Walking up or down a few flights of stairs each day can be good for your heart. Avoid elevators and escalators whenever possible. If you ride the bus or subway to work, get off a stop before your office and walk the extra distance. When you go to the mall or the grocery store, park furthest from the entrance, not as close to it as you can, and you'll get a few extra minutes of walking -- one of the best exercises there is, Dr. Alejandra says.  Walking is great because anyone can do it and you don t need any special equipment other than a properly fitting pair of sneakers.    4. Be a morning person. Studies show that people who exercise in the morning are more likely to stick with it. As Harvey explains,  Are you going to feel like exercising at the end of a hard day? Probably not. If you do your workout in the morning, you re not only more likely to do it, but you'll also set a positive tone for the day.    5. Ink the deal. Whether morning, afternoon, or evening, pick the time that is most  convenient for you to exercise and write it down in your daily planner. Keep your exercise routine as you would keep any appointment.   6. Watch your step. Investing in a good pedometer can help you stay motivated.  If you have a pedometer attached to your waist and you can see how many steps you ve taken, you ll see it doesn t take long to walk 5,000 steps and you will be inspired,  Harvey says. And building up to 10,000 steps a day won t seem like such a daunting a task.   7. Hire the right help. While weight training is important, if you don t know what you re doing, you run the risk of injuring yourself or not being effective, Harvey says. It s best to get instructions from a  at the gym. You also can buy a weight-training DVD and follow along in your living room.   8. Keep records. Grab a diary or logbook, and every day that you exercise, write down what you did and for how long. Your records will make it easy for you to see what you ve accomplished and make you more accountable. Blank pages? You d be ashamed.   9. Phone a friend. Find someone who likes the same activity that you do -- walking in the neighborhood, riding bikes, playing tennis -- and make a date to do it together.  Exercising with a friend or in a group can be very motivating,  Justyn says.  You are likely to walk longer or bike greater distances if you re talking to a friend along the way. The time will go by faster.  Don t have a jazmine who is available? Grab an MP3 player and listen to your favorite music or an audio book while exercising.   10. Do what you like. Whatever exercise you choose, be sure it s one that you enjoy. You re more likely to stick with it if it s something you have fun doing rather than something you see as a chore, Justyn says.   If you can t fit 40 minutes a day into your schedule, get more exercise simply by being less efficient with your chores and adding a little extra walking distance  everywhere you go. However, if you pick an activity you like, finding time for fitness will become effortless and the rewards enormous.   BOOKS ON WEIGHT LOSS  A course in Weight Loss by Cori Gatica    Eat, Drink, and Weigh Less by [place] researcher Taco Ceballos, and Misty Gamboa    Ultrametabolism by Scot Pelayo. Recommend keeping Carbohydrates to  30-45g/meal and 15g/snack with fiber of at least 4g/serving, protein goal of 60-90g/day. Keep food diary on myfitnesspal.com. Recommend pedometer with ultimate goal of 10,000 steps a day.     The Willpower Instinct by Betzy Singleton.    Finding Your Ideal Weight  Most of the people in magazines and on TV are far slimmer than average, yet this is the  ideal  that many people aim for. Before you decide that you won t be happy until you get down to a certain number of pounds, consider:      Your age. You probably wish you could get back to your college weight. But normal changes in metabolism and hormone levels that occur with aging make it unrealistic.    Your gender. In general, men have more muscle and heavier bones than women, which means that healthy men usually weigh more than healthy women of the same height.    Your current weight. If you are very heavy, focus on losing a smaller amount (such as 10 percent of your body weight). Losing just 5 to 10 pounds can improve your health.  Your Body Fat Percentage  A pound of muscle weighs the same as a pound of fat, but it takes up less space. Think of a trained athlete and a  couch potato.  Even though they may be the same height and weight, the athlete looks fitter, is healthier, and probably wears a smaller size of clothing. If you are muscular, a body fat test may be a more accurate measure of your ideal weight than the bathroom scale. Talk to your healthcare provider, who can help you set appropriate goals for yourself.    Weight Management: Healthy Eating  Food is your body s fuel. You can t live without it.  The key is to give your body enough nutrients and energy without eating too much. Reading food labels can help you make healthy choices. Also, learn new eating habits to manage your weight.      All the values on the label are based on one serving. The serving size is the average portion. Remember to multiply the values on the label by the number of servings you eat.   Eat Less Fat  A gram of fat has almost twice the calories of a gram of protein or carbohydrates. Try to balance your food choices so that 20% to 35% of your calories comes from total fat. This means an average of 2  to 3  grams of fat for each 100 calories you eat.  Eat More Fiber  High-fiber foods are digested more slowly than low-fiber foods, so you feel full longer. Try to get 31 grams of fiber each day. Foods high in fiber include:    Vegetables and fruits    Whole-grain or bran breads, pastas, and cereals    Legumes (beans) and peas  As you begin to eat more fiber, be sure to drink plenty of water to keep your digestive system working smoothly.  Tips    Don t skip meals. This often leads to overeating later on. It s best to spread your eating throughout the day.    Eat a variety of foods, not just a few favorites.    If you find yourself eating when you re not hungry, ask yourself why. Many of us eat when we re bored, stressed, or just to be polite. Listen to your body. If you re not hungry, get busy doing something else instead of eating.    Eat slower. It takes 20 minutes for your stomach to tell your brain that it s full.    Pay attention to what you eat. Don t read or watch TV during your meal.      Weight Management: Exercise and Activity  Goal: at least 40 minutes daily  Studies show that people who exercise are the most likely to lose weight and keep it off. Exercise burns calories. It helps build muscle to make your body stronger. Make exercise part of your weight-management plan. You must hit moderate- to high cardiovascular workout for  weight loss. Get your heart rate up to 75% to help you burn fat. Check online for free calculators.  Make Activity Part of Your Day  You may not think you have the time to exercise. But you can work activity into your daily life. Take 10 minutes out of your lunch hour to take a walk. Walk to the [place] to get your paper instead of having it delivered. Make it a habit to take the stairs instead of the elevator. Park in the farthest parking spot instead of the closest. You ll be surprised at how fast these little changes can make a difference.  The Benefits of Exercise    Exercise increases your metabolism (the speed at which your body burns calories).    Regular exercise can increase the amount of muscle in your body. Muscle burns calories faster than fat. The more muscle you have, the more calories you burn.    Exercise gives you energy and curbs your appetite.    Exercise decreases stress and helps you sleep better.  Make Exercise Fun  Exercise can be fun. Choose an activity you enjoy. You may even get a friend to do it with you.    Take a resistance-training or aerobics class    Join a team sport    Take a dance class    Walk the dog    Ride a bike  If you have health problems, be sure to ask your doctor before you start an exercise program. Have a  help you develop a plan that s safe for you.     1619-8293 [facility], [address] [place] [address], [place] [id number]. All rights reserved. This information is not intended as a substitute for professional medical care. Always follow your healthcare professional's instructions.    OTHER AVENUES TO INCREASE PHYSICAL FITNESS    Mapping Walks and Runs, biking   www.mapmyrun.com   www.SaleMove.com    Rating walkability of a neighborhood   www.walkscEmerald Logic.[place]    Treadmill Work Stations   www.Schedulize    Free Workouts at home   www.exercisetLeanplum.tv    Walking, hiking, biking trails   www.ralistotrails.com    Active volunteer  opportunities   www.networkREBIScangoZignals.org    Race or walk/run events in your area   www.active.Stupeflix    WorkAltenera Technology    www.O3b Networks/itunes/   www.Wayfair    Adventure Vacations   www.EXPO Communications    Heart rate Monitors   www.polarusa.com    Pedometers   www.OptiScan Biomedical.[employer]M Automotive    Outdoor treasure hunts on your own   www.Directworks    7 Worst Junk Ingredients to Avoid   (If you don't know what it is, it probably isn't good to eat it!)  1. Sodium Nitrate (also called Sodium Nitrite)  This is a preservative, coloring, and flavoring commonly found in processed meats like steve, ham, hot dogs, cold cuts and smoked fish. Studies have shown that it reacts with the body s digestive acids to form a cancer-causing agent called nitrosamines. So double-check that  healthy  turkey for carcinogens before you gobble down your sandwich!  2. Aspartame (aka NutraSweet/Equal)  In scientific terms, this is a chemical combination of two amino acids and methanol. It s better known by the brand names NutraSweet and Equal, which are sweeteners found in countless  diet  desserts, drink mixes, and soft drinks. Aspartame was once thought to be a safe artificial sweetener, but it is now believed to cause cancer and neurological problems such as dizziness and hallucinations.  3. Acesulfame-K  This artificial sweetener is 200 times sweeter than sugar and is often found in chewing gum and soft drinks. When tested in the laboratory, it caused cancer in rats. And that makes this additive a lot less sweet in our opinion!  4. Artificial Food Colorings  There are food colorings being used that are linked with cancer in animal testing as well as behavioral disorders in children. These include Yellow 5, Red 40, Blue 1, Blue 2, Green 3, Orange B, Red 3 and Yellow 6. Amazingly, these colors have been banned in the [place] yet remain in many American foods. They can easily be avoided by choosing natural foods that aren t chemically or  colorfully enhanced.  5. MSG  Monosodium Glutamate (MSG) is an amino acid used as a flavor enhancer in many soups, salad dressings, chips, frozen entrees and restaurant food. This nasty additive can ian with the nervous system causing side effects like migraines and overeating in some individuals. MSG appears on labels under several aliases, including yeast extract and calcium caseinate. It s even been found on the labels of organic products! Here s a list of the common aliases for MSG.    Monosodium Glutamate    Hydrolyzed Vegetable Protein    Hydrolyzed Protein    Hydrolyzed Plant Protein    Plant Protein Extract    Sodium Caseinate    Calcium Caseinate    Yeast Extract    Textured Protein    Autolyzed Yeast    Hydrolyzed Oat Flour    6. Trans Fats  Trans fats cause heart disease. It s a proven fact. Before purchasing any packaged food, check the ingredients list. Even if the label boasts  0g trans fats  BEWARE the product may still contain up to a 0.5g of trans fats per serving, if you see the words partially hydrogenated oils on the ingredients list. It s important to avoid even the smallest amount because it can raise your bad cholesterol and lower your good cholesterol, making you susceptible to all kinds of health problems!  7. Sodium Benzoate  Sodium benzoate is a preservative used in many foods and beverages. This ingredient is known to cause hives, asthma and other allergic reactions in sensitive individuals. New research shows that it may also cause behavioral disorders in children. One more reason to avoid this harmful ingredient: When used in beverages that also contain ascorbic acid (vitamin C) it forms benzene, a known carcinogen. Some drink manufacturers are still using this toxic duo, so you may have benzene lurking in your favorite drink!              Risks, benefits, side effects and rationale for treatment plan fully discussed with the patient and understanding expressed.    Karyna Stockton,  FNP-[facility]      Answers for HPI/ROS submitted by the patient on 3/14/2019   If you checked off any problems, how difficult have these problems made it for you to do your work, take care of things at home, or get along with other people?: Not difficult at all  PHQ9 TOTAL SCORE: 7  FIDENCIO 7 TOTAL SCORE: 8

## 2019-03-14 NOTE — PATIENT INSTRUCTIONS
1. Tinea versicolor  Acute, stable  - ketoconazole (NIZORAL) 2 % external shampoo; Apply to torso rash and leave on for 5 minutes, then wash off.  This can be done 3 days in a row.  Dispense: 120 mL; Refill: 1    2. Morbid obesity with BMI of 40.0-44.9, adult (H)  Acute, stable      HOW TO BE HEALTHIER    Tools to use: www.sparkpeople.com- FREE website that helps you with food choices, and exercise. You can talk with people, talk to trainers and dieticians.    Increase your water intake daily to 6 glasses     Purchase a pedometer that will monitor how many steps you take (10, 000 daily is a goal for everyone)- these are sometimes included in smart phones.    Use meal replacements such as Aliyah's meals, Lean Cuisines, Healthy Choice, Smart Ones, Weight Watchers Meals, and Slim Fast and Glucerna shakes and supplement with fresh fruits and vegetables    Please drink a lot of water daily. Most people typically need about 2 liters of water daily and more if they are exercising, have a large weight, or have a fever or illness. Add Crystal Light for flavoring if desired. But no pop with calories in it.    Keep a food journal of what you eat, calories in what you eat, and mood. You will see where you are getting the majority of your calories. Bring the journal with you to your nutrition appointment (if you are being referred).    Consider using applications for smart phones such as TaDaweb, Impulsiv, Rockbot, LoseIt, Tap&Track, and RelaxM.    Focus on wet volumetrics, meaning, eat more foods that are high in water and fiber such as fruits and vegetables in order to get that full feeling. These are also good for your overall health as well.    Check out Dr. Lesvia Stearns from [facility] - she has cookbooks with low calorie volumetric recipes    You can try Let's Dish to help you prepare meals for you and your family. Often times, the caloric and nutrition data and serving sizes are available for this food. This can  be a time saving maneuver. The website can give you more information http://www.MeetBall.[employer]/    [employer] Delivers has Let's Dish & fresh low calorie salads    Check out Hello Fresh at https://www.[employer]/food-boxes/classic-box/    Try Cooking Light recipes for low calorie meal preparation and planning    Other food plan options you can search for on the internet and check out include: Paul LOMELI, [facility]    Here are 10 ways to get you moving more often:   1. Be less efficient. People typically try to think of ways to make daily tasks easier. But if we make them harder, we can get more exercise, says Brooke Gabriel, MS, of [employer], a , , and spokeswoman for the [employer] (ACE).  Bring in the groceries from your car one bag at a time so you have to make several trips,  Harvey says.  Put the laundry away a few items at a time, rather than carrying it up in a basket.    2. Shun labor-saving devices. Wash the car by hand rather than taking it to the car wash.  It takes about an hour and a half to do a good job, and in the meantime you ve gotten great exercise,  Harvey says. Use a push mower rather than a riding mower to groom your lawn.   3. Going somewhere? Take the long way. Walking up or down a few flights of stairs each day can be good for your heart. Avoid elevators and escalators whenever possible. If you ride the bus or subway to work, get off a stop before your office and walk the extra distance. When you go to the mall or the grocery store, park furthest from the entrance, not as close to it as you can, and you'll get a few extra minutes of walking -- one of the best exercises there is, Dr. Alejandra says.  Walking is great because anyone can do it and you don t need any special equipment other than a properly fitting pair of sneakers.    4. Be a morning person. Studies show that people who exercise in the morning are  more likely to stick with it. As Harvey explains,  Are you going to feel like exercising at the end of a hard day? Probably not. If you do your workout in the morning, you re not only more likely to do it, but you'll also set a positive tone for the day.    5. Ink the deal. Whether morning, afternoon, or evening, pick the time that is most convenient for you to exercise and write it down in your daily planner. Keep your exercise routine as you would keep any appointment.   6. Watch your step. Investing in a good pedometer can help you stay motivated.  If you have a pedometer attached to your waist and you can see how many steps you ve taken, you ll see it doesn t take long to walk 5,000 steps and you will be inspired,  Harvey says. And building up to 10,000 steps a day won t seem like such a daunting a task.   7. Hire the right help. While weight training is important, if you don t know what you re doing, you run the risk of injuring yourself or not being effective, Harvey says. It s best to get instructions from a  at the gym. You also can buy a weight-training DVD and follow along in your living room.   8. Keep records. Grab a diary or logbook, and every day that you exercise, write down what you did and for how long. Your records will make it easy for you to see what you ve accomplished and make you more accountable. Blank pages? You d be ashamed.   9. Phone a friend. Find someone who likes the same activity that you do -- walking in the neighborhood, riding bikes, playing tennis -- and make a date to do it together.  Exercising with a friend or in a group can be very motivating,  Justyn says.  You are likely to walk longer or bike greater distances if you re talking to a friend along the way. The time will go by faster.  Don t have a jazmine who is available? Grab an MP3 player and listen to your favorite music or an audio book while exercising.   10. Do what you like. Whatever exercise you  choose, be sure it s one that you enjoy. You re more likely to stick with it if it s something you have fun doing rather than something you see as a chore, Justyn says.   If you can t fit 40 minutes a day into your schedule, get more exercise simply by being less efficient with your chores and adding a little extra walking distance everywhere you go. However, if you pick an activity you like, finding time for fitness will become effortless and the rewards enormous.   BOOKS ON WEIGHT LOSS  A course in Weight Loss by Cori Gatica    Eat, Drink, and Weigh Less by Welch researcher Taco Ceballos, and Misty Gamboa    Ultrametabolism by Scot Pelayo. Recommend keeping Carbohydrates to  30-45g/meal and 15g/snack with fiber of at least 4g/serving, protein goal of 60-90g/day. Keep food diary on myfitnesspal.com. Recommend pedometer with ultimate goal of 10,000 steps a day.     The Willpower Instinct by Betzy Singleton.    Finding Your Ideal Weight  Most of the people in magazines and on TV are far slimmer than average, yet this is the  ideal  that many people aim for. Before you decide that you won t be happy until you get down to a certain number of pounds, consider:      Your age. You probably wish you could get back to your college weight. But normal changes in metabolism and hormone levels that occur with aging make it unrealistic.    Your gender. In general, men have more muscle and heavier bones than women, which means that healthy men usually weigh more than healthy women of the same height.    Your current weight. If you are very heavy, focus on losing a smaller amount (such as 10 percent of your body weight). Losing just 5 to 10 pounds can improve your health.  Your Body Fat Percentage  A pound of muscle weighs the same as a pound of fat, but it takes up less space. Think of a trained athlete and a  couch potato.  Even though they may be the same height and weight, the athlete looks fitter, is healthier, and  probably wears a smaller size of clothing. If you are muscular, a body fat test may be a more accurate measure of your ideal weight than the bathroom scale. Talk to your healthcare provider, who can help you set appropriate goals for yourself.    Weight Management: Healthy Eating  Food is your body s fuel. You can t live without it. The key is to give your body enough nutrients and energy without eating too much. Reading food labels can help you make healthy choices. Also, learn new eating habits to manage your weight.      All the values on the label are based on one serving. The serving size is the average portion. Remember to multiply the values on the label by the number of servings you eat.   Eat Less Fat  A gram of fat has almost twice the calories of a gram of protein or carbohydrates. Try to balance your food choices so that 20% to 35% of your calories comes from total fat. This means an average of 2  to 3  grams of fat for each 100 calories you eat.  Eat More Fiber  High-fiber foods are digested more slowly than low-fiber foods, so you feel full longer. Try to get 31 grams of fiber each day. Foods high in fiber include:    Vegetables and fruits    Whole-grain or bran breads, pastas, and cereals    Legumes (beans) and peas  As you begin to eat more fiber, be sure to drink plenty of water to keep your digestive system working smoothly.  Tips    Don t skip meals. This often leads to overeating later on. It s best to spread your eating throughout the day.    Eat a variety of foods, not just a few favorites.    If you find yourself eating when you re not hungry, ask yourself why. Many of us eat when we re bored, stressed, or just to be polite. Listen to your body. If you re not hungry, get busy doing something else instead of eating.    Eat slower. It takes 20 minutes for your stomach to tell your brain that it s full.    Pay attention to what you eat. Don t read or watch TV during your meal.      Weight  Management: Exercise and Activity  Goal: at least 40 minutes daily  Studies show that people who exercise are the most likely to lose weight and keep it off. Exercise burns calories. It helps build muscle to make your body stronger. Make exercise part of your weight-management plan. You must hit moderate- to high cardiovascular workout for weight loss. Get your heart rate up to 75% to help you burn fat. Check online for free calculators.  Make Activity Part of Your Day  You may not think you have the time to exercise. But you can work activity into your daily life. Take 10 minutes out of your lunch hour to take a walk. Walk to the [employer] to get your paper instead of having it delivered. Make it a habit to take the stairs instead of the elevator. Park in the farthest parking spot instead of the closest. You ll be surprised at how fast these little changes can make a difference.  The Benefits of Exercise    Exercise increases your metabolism (the speed at which your body burns calories).    Regular exercise can increase the amount of muscle in your body. Muscle burns calories faster than fat. The more muscle you have, the more calories you burn.    Exercise gives you energy and curbs your appetite.    Exercise decreases stress and helps you sleep better.  Make Exercise Fun  Exercise can be fun. Choose an activity you enjoy. You may even get a friend to do it with you.    Take a resistance-training or aerobics class    Join a team sport    Take a dance class    Walk the dog    Ride a bike  If you have health problems, be sure to ask your doctor before you start an exercise program. Have a  help you develop a plan that s safe for you.     [id number] Jose Luis WarrenWell[facility], [address] [place] [address], [place] [address]. All rights reserved. This information is not intended as a substitute for professional medical care. Always follow your healthcare professional's instructions.    OTHER AVENUES TO INCREASE  PHYSICAL FITNESS    Mapping Walks and Runs, biking   www.mapmyrun.com   www.Jukely.com    Rating walkability of a neighborhood   www.walkContinental Wrestling Federation    Treadmill Work Stations   www.TrueFacet    Free Workouts at home   www.exercisetDr. TATTOFF.tv    Walking, hiking, biking trails   www.ralistotrails.com    Active volunteer opportunities   www.Osmosis.[employer]    Race or walk/run events in your area   www.active.Divshot    WorkoutCyberSense    wwwScienceLogic/[employer]itness/   MinuteBuzz    Adventure Vacations   www.etouches    Heart rate Monitors   www.polarusa.com    Pedometers   www.[employer]    Outdoor treasure hunts on your own   www.light    7 Worst Junk Ingredients to Avoid   (If you don't know what it is, it probably isn't good to eat it!)  1. Sodium Nitrate (also called Sodium Nitrite)  This is a preservative, coloring, and flavoring commonly found in processed meats like steve, ham, hot dogs, cold cuts and smoked fish. Studies have shown that it reacts with the body s digestive acids to form a cancer-causing agent called nitrosamines. So double-check that  healthy  turkey for carcinogens before you gobble down your sandwich!  2. Aspartame (aka NutraSweet/Equal)  In scientific terms, this is a chemical combination of two amino acids and methanol. It s better known by the brand names NutraSweet and Equal, which are sweeteners found in countless  diet  desserts, drink mixes, and soft drinks. Aspartame was once thought to be a safe artificial sweetener, but it is now believed to cause cancer and neurological problems such as dizziness and hallucinations.  3. Acesulfame-K  This artificial sweetener is 200 times sweeter than sugar and is often found in chewing gum and soft drinks. When tested in the laboratory, it caused cancer in rats. And that makes this additive a lot less sweet in our opinion!  4. Artificial Food Colorings  There are food colorings being used that are linked with cancer in  animal testing as well as behavioral disorders in children. These include Yellow 5, Red 40, Blue 1, Blue 2, Green 3, Orange B, Red 3 and Yellow 6. Amazingly, these colors have been banned in the [place] yet remain in many American foods. They can easily be avoided by choosing natural foods that aren t chemically or colorfully enhanced.  5. MSG  Monosodium Glutamate (MSG) is an amino acid used as a flavor enhancer in many soups, salad dressings, chips, frozen entrees and restaurant food. This nasty additive can ian with the nervous system causing side effects like migraines and overeating in some individuals. MSG appears on labels under several aliases, including yeast extract and calcium caseinate. It s even been found on the labels of organic products! Here s a list of the common aliases for MSG.    Monosodium Glutamate    Hydrolyzed Vegetable Protein    Hydrolyzed Protein    Hydrolyzed Plant Protein    Plant Protein Extract    Sodium Caseinate    Calcium Caseinate    Yeast Extract    Textured Protein    Autolyzed Yeast    Hydrolyzed Oat Flour    6. Trans Fats  Trans fats cause heart disease. It s a proven fact. Before purchasing any packaged food, check the ingredients list. Even if the label boasts  0g trans fats  BEWARE the product may still contain up to a 0.5g of trans fats per serving, if you see the words partially hydrogenated oils on the ingredients list. It s important to avoid even the smallest amount because it can raise your bad cholesterol and lower your good cholesterol, making you susceptible to all kinds of health problems!  7. Sodium Benzoate  Sodium benzoate is a preservative used in many foods and beverages. This ingredient is known to cause hives, asthma and other allergic reactions in sensitive individuals. New research shows that it may also cause behavioral disorders in children. One more reason to avoid this harmful ingredient: When used in beverages that also contain ascorbic  acid (vitamin C) it forms benzene, a known carcinogen. Some drink manufacturers are still using this toxic duo, so you may have benzene lurking in your favorite drink!

## 2019-03-14 NOTE — NURSING NOTE
"Chief Complaint   Patient presents with     Derm Problem       Initial /74 (BP Location: Right arm, Patient Position: Sitting, Cuff Size: Adult Large)   Pulse 80   Temp 98.9  F (37.2  C) (Tympanic)   Resp 16   Ht 1.53 m (5' 0.25\")   Wt 102.1 kg (225 lb)   LMP 02/26/2019   BMI 43.58 kg/m   Estimated body mass index is 43.58 kg/m  as calculated from the following:    Height as of this encounter: 1.53 m (5' 0.25\").    Weight as of this encounter: 102.1 kg (225 lb).    Patient presents to the clinic using No DME    Health Maintenance that is potentially due pending provider review:  PHQ9    Done.    Is there anyone who you would like to be able to receive your results? No  If yes have patient fill out DANE    "

## 2019-03-15 ASSESSMENT — ANXIETY QUESTIONNAIRES: GAD7 TOTAL SCORE: 8

## 2019-07-16 ENCOUNTER — TELEPHONE (OUTPATIENT)
Dept: FAMILY MEDICINE | Facility: CLINIC | Age: 39
End: 2019-07-16

## 2019-07-16 NOTE — LETTER
[address] [address]  [phone]        July 18, 2019  Sebastian Schuster  [address] [place] [id number]    Dear Sebastian,    I care about your health and have reviewed your health plan. I have reviewed your medical conditions, medication list, and lab results and am making recommendations based on this review, to better manage your health.    You are in particular need of attention regarding:  -Cervical Cancer Screening    I am recommending that you:  -schedule a PAP SMEAR EXAM which is due.  Please disregard this reminder if you have had this exam elsewhere within the last year.  It would be helpful for us to have a copy of your recent pap smear report in our file so that we can best coordinate your care.    Here is a list of Health Maintenance topics that are due now or due soon:  Health Maintenance Due   Topic Date Due     DEPRESSION ACTION PLAN  1982     PREVENTIVE CARE VISIT  09/28/2011     DTAP/TDAP/TD IMMUNIZATION (2 - Td) 09/03/2018     HPV  07/01/2019     PAP  07/01/2019       Please call us at [phone] (or use BackTrack) to address the above recommendations.     Thank you for trusting [facility] and we appreciate the opportunity to serve you.  We look forward to supporting your healthcare needs in the future.    Healthy Regards,    Karyna Stockton CNP/bill

## 2019-07-16 NOTE — TELEPHONE ENCOUNTER
Panel Management Review      Patient has the following on her problem list: None      Composite cancer screening  Chart review shows that this patient is due/due soon for the following Pap Smear  Summary:    Patient is due/failing the following:   PAP and PHYSICAL    Action needed:   Patient needs office visit for physical and pap.    Type of outreach:    Phone, left message for patient to call back.     Questions for provider review:    None                                                                                                                                    Cindy Whittaker MA       Chart routed to Care Team .

## 2019-07-23 ENCOUNTER — OFFICE VISIT (OUTPATIENT)
Dept: FAMILY MEDICINE | Facility: CLINIC | Age: 39
End: 2019-07-23
Payer: COMMERCIAL

## 2019-07-23 VITALS
HEART RATE: 84 BPM | TEMPERATURE: 98 F | BODY MASS INDEX: 43 KG/M2 | DIASTOLIC BLOOD PRESSURE: 72 MMHG | RESPIRATION RATE: 16 BRPM | SYSTOLIC BLOOD PRESSURE: 124 MMHG | WEIGHT: 219 LBS | HEIGHT: 60 IN

## 2019-07-23 DIAGNOSIS — Z13.6 CARDIOVASCULAR SCREENING; LDL GOAL LESS THAN 160: ICD-10-CM

## 2019-07-23 DIAGNOSIS — E66.01 MORBID OBESITY WITH BMI OF 40.0-44.9, ADULT (H): Chronic | ICD-10-CM

## 2019-07-23 DIAGNOSIS — Z12.4 CERVICAL CANCER SCREENING: ICD-10-CM

## 2019-07-23 DIAGNOSIS — Z00.00 ROUTINE GENERAL MEDICAL EXAMINATION AT A HEALTH CARE FACILITY: Primary | ICD-10-CM

## 2019-07-23 PROCEDURE — 87624 HPV HI-RISK TYP POOLED RSLT: CPT | Performed by: NURSE PRACTITIONER

## 2019-07-23 PROCEDURE — G0145 SCR C/V CYTO,THINLAYER,RESCR: HCPCS | Performed by: NURSE PRACTITIONER

## 2019-07-23 PROCEDURE — 99395 PREV VISIT EST AGE 18-39: CPT | Performed by: NURSE PRACTITIONER

## 2019-07-23 PROCEDURE — 99213 OFFICE O/P EST LOW 20 MIN: CPT | Mod: 25 | Performed by: NURSE PRACTITIONER

## 2019-07-23 ASSESSMENT — ENCOUNTER SYMPTOMS
PALPITATIONS: 0
SORE THROAT: 0
DIZZINESS: 0
ARTHRALGIAS: 0
WEAKNESS: 0
NERVOUS/ANXIOUS: 0
NAUSEA: 0
PARESTHESIAS: 0
FREQUENCY: 0
CONSTIPATION: 0
JOINT SWELLING: 0
DYSURIA: 0
HEARTBURN: 0
HEADACHES: 0
COUGH: 0
EYE PAIN: 0
MYALGIAS: 0
CHILLS: 0
HEMATURIA: 0
BREAST MASS: 0
FEVER: 0
HEMATOCHEZIA: 0
DIARRHEA: 0
SHORTNESS OF BREATH: 0
ABDOMINAL PAIN: 0

## 2019-07-23 ASSESSMENT — MIFFLIN-ST. JEOR: SCORE: 1604.88

## 2019-07-23 NOTE — NURSING NOTE
Chief Complaint   Patient presents with     Physical     Pap        Initial /72   Pulse 84   Temp 98  F (36.7  C) (Tympanic)   Resp 16   Ht 1.524 m (5')   Wt 99.3 kg (219 lb)   LMP 07/17/2019   BMI 42.77 kg/m   Estimated body mass index is 42.77 kg/m  as calculated from the following:    Height as of this encounter: 1.524 m (5').    Weight as of this encounter: 99.3 kg (219 lb).    Patient presents to the clinic using No DME    Health Maintenance that is potentially due pending provider review:  Pap Smear    Done today.    Is there anyone who you would like to be able to receive your results? No  If yes have patient fill out DANE

## 2019-07-23 NOTE — LETTER
My Depression Action Plan  Name: Sebastian Schuster   Date of Birth 1980  Date: 7/23/2019    My doctor: Karyna Stockton   My clinic: [address] [address]  [phone]          GREEN    ZONE   Good Control    What it looks like:     Things are going generally well. You have normal up s and down s. You may even feel depressed from time to time, but bad moods usually last less than a day.   What you need to do:  1. Continue to care for yourself (see self care plan)  2. Check your depression survival kit and update it as needed  3. Follow your physician s recommendations including any medication.  4. Do not stop taking medication unless you consult with your physician first.           YELLOW         ZONE Getting Worse    What it looks like:     Depression is starting to interfere with your life.     It may be hard to get out of bed; you may be starting to isolate yourself from others.    Symptoms of depression are starting to last most all day and this has happened for several days.     You may have suicidal thoughts but they are not constant.   What you need to do:     1. Call your care team, your response to treatment will improve if you keep your care team informed of your progress. Yellow periods are signs an adjustment may need to be made.     2. Continue your self-care, even if you have to fake it!    3. Talk to someone in your support network    4. Open up your depression survival kit           RED    ZONE Medical Alert - Get Help    What it looks like:     Depression is seriously interfering with your life.     You may experience these or other symptoms: You can t get out of bed most days, can t work or engage in other necessary activities, you have trouble taking care of basic hygiene, or basic responsibilities, thoughts of suicide or death that will not go away, self-injurious behavior.     What you need to do:  1. Call your care team and  request a same-day appointment. If they are not available (weekends or after hours) call your local crisis line, emergency room or 911.            Depression Self Care Plan / Survival Kit    Self-Care for Depression  Here s the deal. Your body and mind are really not as separate as most people think.  What you do and think affects how you feel and how you feel influences what you do and think. This means if you do things that people who feel good do, it will help you feel better.  Sometimes this is all it takes.  There is also a place for medication and therapy depending on how severe your depression is, so be sure to consult with your medical provider and/ or Behavioral Health Consultant if your symptoms are worsening or not improving.     In order to better manage my stress, I will:    Exercise  Get some form of exercise, every day. This will help reduce pain and release endorphins, the  feel good  chemicals in your brain. This is almost as good as taking antidepressants!  This is not the same as joining a gym and then never going! (they count on that by the way ) It can be as simple as just going for a walk or doing some gardening, anything that will get you moving.      Hygiene   Maintain good hygiene (Get out of bed in the morning, Make your bed, Brush your teeth, Take a shower, and Get dressed like you were going to work, even if you are unemployed).  If your clothes don't fit try to get ones that do.    Diet  I will strive to eat foods that are good for me, drink plenty of water, and avoid excessive sugar, caffeine, alcohol, and other mood-altering substances.  Some foods that are helpful in depression are: complex carbohydrates, B vitamins, flaxseed, fish or fish oil, fresh fruits and vegetables.    Psychotherapy  I agree to participate in Individual Therapy (if recommended).    Medication  If prescribed medications, I agree to take them.  Missing doses can result in serious side effects.  I understand that  drinking alcohol, or other illicit drug use, may cause potential side effects.  I will not stop my medication abruptly without first discussing it with my provider.    Staying Connected With Others  I will stay in touch with my friends, family members, and my primary care provider/team.    Use your imagination  Be creative.  We all have a creative side; it doesn t matter if it s oil painting, sand castles, or mud pies! This will also kick up the endorphins.    Witness Beauty  (AKA stop and smell the roses) Take a look outside, even in mid-winter. Notice colors, textures. Watch the squirrels and birds.     Service to others  Be of service to others.  There is always someone else in need.  By helping others we can  get out of ourselves  and remember the really important things.  This also provides opportunities for practicing all the other parts of the program.    Humor  Laugh and be silly!  Adjust your TV habits for less news and crime-drama and more comedy.    Control your stress  Try breathing deep, massage therapy, biofeedback, and meditation. Find time to relax each day.     My support system    Clinic Contact:  Phone number:    Contact 1:  Phone number:    Contact 2:  Phone number:    Denominational/:  Phone number:    Therapist:  Phone number:    Local crisis center:    Phone number:    Other community support:  Phone number:

## 2019-07-23 NOTE — PATIENT INSTRUCTIONS
1. Routine general medical examination at a health care facility  Screening  Schedule lab only fasting appointment sometime in the next month    2. Cervical cancer screening  Screening  - Pap imaged thin layer screen with HPV - recommended age 30 - 65  - HPV High Risk Types DNA Cervical    3. Morbid obesity with BMI of 40.0-44.9, adult (H)  Chronic, stable  - **Basic metabolic panel FUTURE anytime; Future  Increase activity    4. CARDIOVASCULAR SCREENING; LDL GOAL LESS THAN 160  Screening  - Lipid panel reflex to direct LDL Fasting; Future    Preventive Health Recommendations  Female Ages 26 - 39  Yearly exam:   See your health care provider every year in order to    Review health changes.     Discuss preventive care.      Review your medicines if you your doctor has prescribed any.    Until age 30: Get a Pap test every three years (more often if you have had an abnormal result).    After age 30: Talk to your doctor about whether you should have a Pap test every 3 years or have a Pap test with HPV screening every 5 years.   You do not need a Pap test if your uterus was removed (hysterectomy) and you have not had cancer.  You should be tested each year for STDs (sexually transmitted diseases), if you're at risk.   Talk to your provider about how often to have your cholesterol checked.  If you are at risk for diabetes, you should have a diabetes test (fasting glucose).  Shots: Get a flu shot each year. Get a tetanus shot every 10 years.   Nutrition:     Eat at least 5 servings of fruits and vegetables each day.    Eat whole-grain bread, whole-wheat pasta and brown rice instead of white grains and rice.    Get adequate Calcium and Vitamin D.     Lifestyle    Exercise at least 150 minutes a week (30 minutes a day, 5 days of the week). This will help you control your weight and prevent disease.    Limit alcohol to one drink per day.    No smoking.     Wear sunscreen to prevent skin cancer.    See your dentist every six  months for an exam and cleaning.    HOW TO BE HEALTHIER    Tools to use: Campus [employer] www.Der GrÃ¼ne Punkt.VONTRAVEL- FREE website that helps you with food choices, and exercise. You can talk with people, talk to trainers and dieticians.    Increase your water intake daily to 6 glasses     Purchase a pedometer that will monitor how many steps you take (10, 000 daily is a goal for everyone)- these are sometimes included in smart phones.    Use meal replacements such as Aliyah's meals, Lean Cuisines, Healthy Choice, Smart Ones, Weight Watchers Meals, Slim Fast and Glucerna shakes and supplement with fresh fruits and vegetables      Please drink a lot of water daily. Most people typically need about 2 liters of water daily and more if they are exercising, have a large weight, or have a fever or illness. Add Crystal Light for flavoring if desired. But no pop with calories in it.      Keep a food journal of what you eat, calories in what you eat, and mood. You will see where you are getting the majority of your calories. Bring the journal with you to your nutrition appointment (if you are being referred).      Fitness apps: MyFitnessPal, LifeFitness, Fitbit, Sworkit Fitness(simple to-go fitness), High Intensity HIIT workouts,  NEOU (life and on-demand videos,       Food apps: Weight Watchers, SparkRecipes, LoseIt, Tap&Track, Fooducate, and HealthyOut (restaurant food tracking).      Focus on wet volumetrics, meaning, eat more foods that are high in water and fiber such as fruits and vegetables in order to get that full feeling. These are also good for your overall health as well.      Food delivery Let's [facility] to help you prepare meals for you and your family. Often times, the caloric and nutrition data and serving sizes are available for this food. This can be a time saving maneuver. The website can give you more information http://www.Graph Story.VONTRAVEL/. [employer] has and delivers Let's Dish & fresh low calorie salads      Food delivery  Home  www.homechef.com      Food delivery Green  www.greenTherativef.com      Food delivery Hello Fresh at https://www.SimpliVTloMoseo (SeniorHomes.com).com/food-boxes/classic-box/      Try Cooking Light recipes for low calorie meal preparation and planning      Diabetic carb counting: CalorieKing   http://www.calorieking.Capitol Bells/      Other food plan options you can search for on the internet and check out include: Paul LOMELI, [facility]    Here are 10 ways to get you moving more often:   1. Be less efficient. People typically try to think of ways to make daily tasks easier. But if we make them harder, we can get more exercise, says Brooke Gabriel MS, of [employer], a , , and spokeswoman for the [employer] [place] on Exercise (ACE).  Bring in the groceries from your car one bag at a time so you have to make several trips,  Harvey says.  Put the laundry away a few items at a time, rather than carrying it up in a basket.    2. Shun labor-saving devices. Wash the car by hand rather than taking it to the car wash.  It takes about an hour and a half to do a good job, and in the meantime you ve gotten great exercise,  Harvey says. Use a push mower rather than a riding mower to groom your lawn.   3. Going somewhere? Take the long way. Walking up or down a few flights of stairs each day can be good for your heart. Avoid elevators and escalators whenever possible. If you ride the bus or subway to work, get off a stop before your office and walk the extra distance. When you go to the mall or the grocery store, park furthest from the entrance, not as close to it as you can, and you'll get a few extra minutes of walking -- one of the best exercises there is, Dr. Alejandra says.  Walking is great because anyone can do it and you don t need any special equipment other than a properly fitting pair of sneakers.    4. Be a morning person. Studies show that people who exercise in the morning are more  likely to stick with it. As Harvey explains,  Are you going to feel like exercising at the end of a hard day? Probably not. If you do your workout in the morning, you re not only more likely to do it, but you'll also set a positive tone for the day.    5. Ink the deal. Whether morning, afternoon, or evening, pick the time that is most convenient for you to exercise and write it down in your daily planner. Keep your exercise routine as you would keep any appointment.   6. Watch your step. Investing in a good pedometer can help you stay motivated.  If you have a pedometer attached to your waist and you can see how many steps you ve taken, you ll see it doesn t take long to walk 5,000 steps and you will be inspired,  Harvey says. And building up to 10,000 steps a day won t seem like such a daunting a task.   7. Hire the right help. While weight training is important, if you don t know what you re doing, you run the risk of injuring yourself or not being effective, Harvey says. It s best to get instructions from a  at the gym. You also can buy a weight-training DVD and follow along in your living room.   8. Keep records. Grab a diary or logbook, and every day that you exercise, write down what you did and for how long. Your records will make it easy for you to see what you ve accomplished and make you more accountable. Blank pages? You d be ashamed.   9. Phone a friend. Find someone who likes the same activity that you do -- walking in the neighborhood, riding bikes, playing tennis -- and make a date to do it together.  Exercising with a friend or in a group can be very motivating,  Justyn says.  You are likely to walk longer or bike greater distances if you re talking to a friend along the way. The time will go by faster.  Don t have a jazmine who is available? Grab an ImmunoPhotonics3 player and listen to your favorite music or an audio book while exercising.   10. Do what you like. Whatever exercise you choose,  be sure it s one that you enjoy. You re more likely to stick with it if it s something you have fun doing rather than something you see as a chore, Justyn says.   If you can t fit 40 minutes a day into your schedule, get more exercise simply by being less efficient with your chores and adding a little extra walking distance everywhere you go. However, if you pick an activity you like, finding time for fitness will become effortless and the rewards enormous.   BOOKS ON WEIGHT LOSS  A course in Weight Loss by Cori Gatica    Eat, Drink, and Weigh Less by [place] researcher Taco Ceballos, and Misty Gamboa    Ultrametabolism by Scot Pelayo. Recommend keeping Carbohydrates to  30-45g/meal and 15g/snack with fiber of at least 4g/serving, protein goal of 60-90g/day. Keep food diary on myfitnesspal.com. Recommend pedometer with ultimate goal of 10,000 steps a day.     The Willpower Instinct by Betzy Singleton.    Finding Your Ideal Weight  Most of the people in magazines and on TV are far slimmer than average, yet this is the  ideal  that many people aim for. Before you decide that you won t be happy until you get down to a certain number of pounds, consider:    Your age. You probably wish you could get back to your college weight. But normal changes in metabolism and hormone levels that occur with aging make it unrealistic.    Your gender. In general, men have more muscle and heavier bones than women, which means that healthy men usually weigh more than healthy women of the same height.    Your current weight. If you are very heavy, focus on losing a smaller amount (such as 10 percent of your body weight). Losing just 5 to 10 pounds can improve your health.  Your Body Fat Percentage  A pound of muscle weighs the same as a pound of fat, but it takes up less space. Think of a trained athlete and a  couch potato.  Even though they may be the same height and weight, the athlete looks fitter, is healthier, and probably  wears a smaller size of clothing. If you are muscular, a body fat test may be a more accurate measure of your ideal weight than the bathroom scale. Talk to your healthcare provider, who can help you set appropriate goals for yourself.    Weight Management: Healthy Eating  Food is your body s fuel. You can t live without it. The key is to give your body enough nutrients and energy without eating too much. Reading food labels can help you make healthy choices. Also, learn new eating habits to manage your weight.      All the values on the label are based on one serving. The serving size is the average portion. Remember to multiply the values on the label by the number of servings you eat.   Eat Less Fat  A gram of fat has almost twice the calories of a gram of protein or carbohydrates. Try to balance your food choices so that 20% to 35% of your calories comes from total fat. This means an average of 2  to 3  grams of fat for each 100 calories you eat.  Eat More Fiber  High-fiber foods are digested more slowly than low-fiber foods, so you feel full longer. Try to get 31 grams of fiber each day. Foods high in fiber include:    Vegetables and fruits    Whole-grain or bran breads, pastas, and cereals    Legumes (beans) and peas  As you begin to eat more fiber, be sure to drink plenty of water to keep your digestive system working smoothly.  Tips    Don t skip meals. This often leads to overeating later on. It s best to spread your eating throughout the day.    Eat a variety of foods, not just a few favorites.    If you find yourself eating when you re not hungry, ask yourself why. Many of us eat when we re bored, stressed, or just to be polite. Listen to your body. If you re not hungry, get busy doing something else instead of eating.    Eat slower. It takes 20 minutes for your stomach to tell your brain that it s full.    Pay attention to what you eat. Don t read or watch TV during your meal.      Weight Management:  Exercise and Activity  Goal: at least 40 minutes daily  Studies show that people who exercise are the most likely to lose weight and keep it off. Exercise burns calories. It helps build muscle to make your body stronger. Make exercise part of your weight-management plan. You must hit moderate- to high cardiovascular workout for weight loss. Get your heart rate up to 75% to help you burn fat. Check online for free calculators.  Make Activity Part of Your Day  You may not think you have the time to exercise. But you can work activity into your daily life. Take 10 minutes out of your lunch hour to take a walk. Walk to the [employer] to get your paper instead of having it delivered. Make it a habit to take the stairs instead of the elevator. Park in the farthest parking spot instead of the closest. You ll be surprised at how fast these little changes can make a difference.  The Benefits of Exercise    Exercise increases your metabolism (the speed at which your body burns calories).    Regular exercise can increase the amount of muscle in your body. Muscle burns calories faster than fat. The more muscle you have, the more calories you burn.    Exercise gives you energy and curbs your appetite.    Exercise decreases stress and helps you sleep better.  Make Exercise Fun  Exercise can be fun. Choose an activity you enjoy. You may even get a friend to do it with you.    Take a resistance-training or aerobics class    Join a team sport    Take a dance class    Walk the dog    Ride a bike  If you have health problems, be sure to ask your doctor before you start an exercise program. Have a  help you develop a plan that s safe for you.     7713-4308 Jose Luis \[facility]\"", [address] [place] [address], [place] [address]. All rights reserved. This information is not intended as a substitute for professional medical care. Always follow your healthcare professional's instructions.    OTHER AVENUES TO INCREASE PHYSICAL  FITNESS    Mapping Walks and Runs, biking   www.mapmyrun.com   www.mapmybike.com    Rating walkability of a neighborhood   www.walkPropel IT    Treadmill Work Stations   www.Varsity News Network    Free Workouts at home   www.exercisetPatients Know Best.BioLeap    Walking, hiking, biking trails   www.ralistotrails.com    Active volunteer opportunities   www.Nosopharm.Altierre    Race or walk/run events in your area   www.active.Adenios    WorkoutTreasure In The Sand Pizzeria    wwwLanier Parking Solutions/TravelCLICK/   [employer]    Adventure Vacations   www.meQuilibrium    Heart rate Monitors   www.polarusa.com    Pedometers   www.[place] [employer].Smart Museum    Outdoor treasure hunts on your own   www.AlphaSights    7 Worst Junk Ingredients to Avoid   (If you don't know what it is, it probably isn't good to eat it!)  1. Sodium Nitrate (also called Sodium Nitrite)  This is a preservative, coloring, and flavoring commonly found in processed meats like steve, ham, hot dogs, cold cuts and smoked fish. Studies have shown that it reacts with the body s digestive acids to form a cancer-causing agent called nitrosamines. So double-check that  healthy  turkey for carcinogens before you gobble down your sandwich!  2. Aspartame (aka NutraSweet/Equal)  In scientific terms, this is a chemical combination of two amino acids and methanol. It s better known by the brand names NutraSweet and Equal, which are sweeteners found in countless  diet  desserts, drink mixes, and soft drinks. Aspartame was once thought to be a safe artificial sweetener, but it is now believed to cause cancer and neurological problems such as dizziness and hallucinations.  3. Acesulfame-K  This artificial sweetener is 200 times sweeter than sugar and is often found in chewing gum and soft drinks. When tested in the laboratory, it caused cancer in rats. And that makes this additive a lot less sweet in our opinion!  4. Artificial Food Colorings  There are food colorings being used that are linked with cancer in animal  testing as well as behavioral disorders in children. These include Yellow 5, Red 40, Blue 1, Blue 2, Green 3, Orange B, Red 3 and Yellow 6. Amazingly, these colors have been banned in the [place] yet remain in many American foods. They can easily be avoided by choosing natural foods that aren t chemically or colorfully enhanced.  5. MSG  Monosodium Glutamate (MSG) is an amino acid used as a flavor enhancer in many soups, salad dressings, chips, frozen entrees and restaurant food. This nasty additive can ian with the nervous system causing side effects like migraines and overeating in some individuals. MSG appears on labels under several aliases, including yeast extract and calcium caseinate. It s even been found on the labels of organic products! Here s a list of the common aliases for MSG.    Monosodium Glutamate    Hydrolyzed Vegetable Protein    Hydrolyzed Protein    Hydrolyzed Plant Protein    Plant Protein Extract    Sodium Caseinate    Calcium Caseinate    Yeast Extract    Textured Protein    Autolyzed Yeast    Hydrolyzed Oat Flour    6. Trans Fats  Trans fats cause heart disease. It s a proven fact. Before purchasing any packaged food, check the ingredients list. Even if the label boasts  0g trans fats  BEWARE the product may still contain up to a 0.5g of trans fats per serving, if you see the words partially hydrogenated oils on the ingredients list. It s important to avoid even the smallest amount because it can raise your bad cholesterol and lower your good cholesterol, making you susceptible to all kinds of health problems!  7. Sodium Benzoate  Sodium benzoate is a preservative used in many foods and beverages. This ingredient is known to cause hives, asthma and other allergic reactions in sensitive individuals. New research shows that it may also cause behavioral disorders in children. One more reason to avoid this harmful ingredient: When used in beverages that also contain ascorbic acid  (vitamin C) it forms benzene, a known carcinogen. Some drink manufacturers are still using this toxic duo, so you may have benzene lurking in your favorite drink!

## 2019-07-23 NOTE — PROGRESS NOTES
SUBJECTIVE:   CC: Sebastian Schuster is an 38 year old woman who presents for preventive health visit.     Healthy Habits:     Getting at least 3 servings of Calcium per day:  Yes    Bi-annual eye exam:  NO    Dental care twice a year:  Yes    Sleep apnea or symptoms of sleep apnea:  None    Diet:  Regular (no restrictions)    Frequency of exercise:  1 day/week    Duration of exercise:  15-30 minutes    Taking medications regularly:  Yes    Medication side effects:  Not applicable    PHQ-2 Total Score: 2    Additional concerns today:  No    Exercise: Online mom group  Workouts 20 min once a week    Today's PHQ-2 Score:   PHQ-2 ( 1999 Pfizer) 7/23/2019   Q1: Little interest or pleasure in doing things 1   Q2: Feeling down, depressed or hopeless 1   PHQ-2 Score 2   Q1: Little interest or pleasure in doing things Several days   Q2: Feeling down, depressed or hopeless Several days   PHQ-2 Score 2       Abuse: Current or Past(Physical, Sexual or Emotional)- No  Do you feel safe in your environment? Yes    Social History     Tobacco Use     Smoking status: Never Smoker     Smokeless tobacco: Never Used   Substance Use Topics     Alcohol use: No     Comment: rare     If you drink alcohol do you typically have >3 drinks per day or >7 drinks per week? No    Alcohol Use 7/23/2019   Prescreen: >3 drinks/day or >7 drinks/week? No   Prescreen: >3 drinks/day or >7 drinks/week? -       Reviewed orders with patient.  Reviewed health maintenance and updated orders accordingly - Yes  Labs reviewed in EPIC  BP Readings from Last 3 Encounters:   07/23/19 124/72   03/14/19 118/74   12/06/18 138/84    Wt Readings from Last 3 Encounters:   07/23/19 99.3 kg (219 lb)   03/14/19 102.1 kg (225 lb)   12/06/18 99.3 kg (219 lb)                  Patient Active Problem List   Diagnosis     CARDIOVASCULAR SCREENING; LDL GOAL LESS THAN 160     Morbid obesity with BMI of 40.0-44.9, adult (H)     HX of External hemorrhoids     Past Surgical History:    Procedure Laterality Date     C/SECTION, LOW TRANSVERSE  3-    , Low Transverse     C/SECTION, LOW TRANSVERSE       COLONOSCOPY  2013    Procedure: COLONOSCOPY;  Colonoscopy  ;  Surgeon: Jorge Underwood MD;  Location: [facility]     GALLBLADDER SURGERY         Social History     Tobacco Use     Smoking status: Never Smoker     Smokeless tobacco: Never Used   Substance Use Topics     Alcohol use: No     Comment: rare     Family History   Problem Relation Age of Onset     Cancer Maternal Grandmother      Circulatory Mother 43        carotid and leg stents         Current Outpatient Medications   Medication Sig Dispense Refill     ENSKYCE 0.15-30 MG-MCG tablet TAKE 1 TABLET BY MOUTH ONCE DAILY 84 tablet 3     ketoconazole (NIZORAL) 2 % external shampoo Apply to torso rash and leave on for 5 minutes, then wash off.  This can be done 3 days in a row. 120 mL 1     Allergies   Allergen Reactions     Augmentin Rash     Rash      Penicillins      See augmentin reaction      Lavender Oil Rash       Mammogram not appropriate for this patient based on age.    Pertinent mammograms are reviewed under the imaging tab.  History of abnormal Pap smear: NO - age 30-65 PAP every 5 years with negative HPV co-testing recommended  PAP / HPV 2014 10/12/2011 2010   PAP NIL NIL NIL     Reviewed and updated as needed this visit by clinical staff  Tobacco  Allergies  Meds  Problems  Med Hx  Surg Hx  Fam Hx  Soc Hx          Reviewed and updated as needed this visit by Provider  Tobacco  Allergies  Meds  Problems  Med Hx  Surg Hx  Fam Hx          Review of Systems   Constitutional: Negative for chills and fever.   HENT: Negative for congestion, ear pain, hearing loss and sore throat.    Eyes: Negative for pain and visual disturbance.   Respiratory: Negative for cough and shortness of breath.    Cardiovascular: Negative for chest pain, palpitations and peripheral edema.   Gastrointestinal:  Negative for abdominal pain, constipation, diarrhea, heartburn, hematochezia and nausea.   Breasts:  Negative for tenderness, breast mass and discharge.   Genitourinary: Negative for dysuria, frequency, genital sores, hematuria, pelvic pain, urgency, vaginal bleeding and vaginal discharge.   Musculoskeletal: Negative for arthralgias, joint swelling and myalgias.   Skin: Negative for rash.   Neurological: Negative for dizziness, weakness, headaches and paresthesias.   Psychiatric/Behavioral: Negative for mood changes. The patient is not nervous/anxious.         OBJECTIVE:   /72   Pulse 84   Temp 98  F (36.7  C) (Tympanic)   Resp 16   Ht 1.524 m (5')   Wt 99.3 kg (219 lb)   LMP 07/17/2019   BMI 42.77 kg/m    Physical Exam  GENERAL: healthy, alert and no distress, obese  EYES: Eyes grossly normal to inspection, PERRL and conjunctivae and sclerae normal  HENT: ear canals and TM's normal, nose and mouth without ulcers or lesions  NECK: no adenopathy, no asymmetry, masses, or scars and thyroid normal to palpation  RESP: lungs clear to auscultation - no rales, rhonchi or wheezes  BREAST: normal without masses, tenderness or nipple discharge and no palpable axillary masses or adenopathy  CV: regular rate and rhythm, normal S1 S2, no S3 or S4, no murmur, click or rub, no peripheral edema and peripheral pulses strong  ABDOMEN: soft, nontender, no hepatosplenomegaly, no masses and bowel sounds normal   (female): normal female external genitalia, normal urethral meatus, vaginal mucosa pink, moist, well rugated, and normal cervix/adnexa/uterus without masses or discharge  MS: no gross musculoskeletal defects noted, no edema  SKIN: no suspicious lesions or rashes  NEURO: Normal strength and tone, mentation intact and speech normal  PSYCH: mentation appears normal, affect normal/bright      ASSESSMENT/PLAN:     1. Routine general medical examination at a health care facility  Screening  Schedule lab only fasting  appointment sometime in the next month  Will refill birth control for the year, when it comes back from pharmacy    2. Cervical cancer screening  Screening  - Pap imaged thin layer screen with HPV - recommended age 30 - 65  - HPV High Risk Types DNA Cervical    3. Morbid obesity with BMI of 40.0-44.9, adult (H)  Chronic, stable  - **Basic metabolic panel FUTURE anytime; Future  Increase activity    4. CARDIOVASCULAR SCREENING; LDL GOAL LESS THAN 160  Screening  - Lipid panel reflex to direct LDL Fasting; Future    Preventive Health Recommendations  Female Ages 26 - 39  Yearly exam:   See your health care provider every year in order to    Review health changes.     Discuss preventive care.      Review your medicines if you your doctor has prescribed any.    Until age 30: Get a Pap test every three years (more often if you have had an abnormal result).    After age 30: Talk to your doctor about whether you should have a Pap test every 3 years or have a Pap test with HPV screening every 5 years.   You do not need a Pap test if your uterus was removed (hysterectomy) and you have not had cancer.  You should be tested each year for STDs (sexually transmitted diseases), if you're at risk.   Talk to your provider about how often to have your cholesterol checked.  If you are at risk for diabetes, you should have a diabetes test (fasting glucose).  Shots: Get a flu shot each year. Get a tetanus shot every 10 years.   Nutrition:     Eat at least 5 servings of fruits and vegetables each day.    Eat whole-grain bread, whole-wheat pasta and brown rice instead of white grains and rice.    Get adequate Calcium and Vitamin D.     Lifestyle    Exercise at least 150 minutes a week (30 minutes a day, 5 days of the week). This will help you control your weight and prevent disease.    Limit alcohol to one drink per day.    No smoking.     Wear sunscreen to prevent skin cancer.    See your dentist every six months for an exam and  cleaning.    HOW TO BE HEALTHIER    Tools to use: Saharey www.[employer].Preo- FREE website that helps you with food choices, and exercise. You can talk with people, talk to trainers and dieticians.    Increase your water intake daily to 6 glasses     Purchase a pedometer that will monitor how many steps you take (10, 000 daily is a goal for everyone)- these are sometimes included in smart phones.    Use meal replacements such as Aliyah's meals, Lean Cuisines, Healthy Choice, Smart Ones, Weight Watchers Meals, Slim Fast and Glucerna shakes and supplement with fresh fruits and vegetables      Please drink a lot of water daily. Most people typically need about 2 liters of water daily and more if they are exercising, have a large weight, or have a fever or illness. Add Crystal Light for flavoring if desired. But no pop with calories in it.      Keep a food journal of what you eat, calories in what you eat, and mood. You will see where you are getting the majority of your calories. Bring the journal with you to your nutrition appointment (if you are being referred).      Fitness apps: MyFitnessPal, LifeFitness, Fitbit, Sworkit Fitness(simple to-go fitness), High Intensity HIIT workouts,  NEOU (life and on-demand videos,       Food apps: Weight Watchers, SparkRecipes, LoseIt, Tap&Track, Fooducate, and HealthyOut (restaurant food tracking).      Focus on wet volumetrics, meaning, eat more foods that are high in water and fiber such as fruits and vegetables in order to get that full feeling. These are also good for your overall health as well.      Food delivery Let's [facility] to help you prepare meals for you and your family. Often times, the caloric and nutrition data and serving sizes are available for this food. This can be a time saving maneuver. The website can give you more information http://www.Digiscend.Preo/. [employer] has and delivers Let's Dish & fresh low calorie salads      Food delivery Home   www.Transmit Promo      Food delivery Green  www.eDealyaf.com      Food delivery Hello Fresh at https://www.OPPRTUNITYloRxMP Therapeuticsesh.com/food-boxes/classic-box/      Try Cooking Light recipes for low calorie meal preparation and planning      Diabetic carb counting: CalorieKing   http://www.calorieking.com/      Other food plan options you can search for on the internet and check out include: Paul LOMELI, [facility]    Here are 10 ways to get you moving more often:   1. Be less efficient. People typically try to think of ways to make daily tasks easier. But if we make them harder, we can get more exercise, says Brooke Gabriel MS, of [employer], a , , and spokeswoman for the [employer] [place] on Exercise (ACE).  Bring in the groceries from your car one bag at a time so you have to make several trips,  Harvey says.  Put the laundry away a few items at a time, rather than carrying it up in a basket.    2. Shun labor-saving devices. Wash the car by hand rather than taking it to the car wash.  It takes about an hour and a half to do a good job, and in the meantime you ve gotten great exercise,  Harvey says. Use a push mower rather than a riding mower to groom your lawn.   3. Going somewhere? Take the long way. Walking up or down a few flights of stairs each day can be good for your heart. Avoid elevators and escalators whenever possible. If you ride the bus or subway to work, get off a stop before your office and walk the extra distance. When you go to the mall or the grocery store, park furthest from the entrance, not as close to it as you can, and you'll get a few extra minutes of walking -- one of the best exercises there is, Dr. Alejandra says.  Walking is great because anyone can do it and you don t need any special equipment other than a properly fitting pair of sneakers.    4. Be a morning person. Studies show that people who exercise in the morning are more likely to  stick with it. As Harvey explains,  Are you going to feel like exercising at the end of a hard day? Probably not. If you do your workout in the morning, you re not only more likely to do it, but you'll also set a positive tone for the day.    5. Ink the deal. Whether morning, afternoon, or evening, pick the time that is most convenient for you to exercise and write it down in your daily planner. Keep your exercise routine as you would keep any appointment.   6. Watch your step. Investing in a good pedometer can help you stay motivated.  If you have a pedometer attached to your waist and you can see how many steps you ve taken, you ll see it doesn t take long to walk 5,000 steps and you will be inspired,  Harvey says. And building up to 10,000 steps a day won t seem like such a daunting a task.   7. Hire the right help. While weight training is important, if you don t know what you re doing, you run the risk of injuring yourself or not being effective, Harvey says. It s best to get instructions from a  at the gym. You also can buy a weight-training DVD and follow along in your living room.   8. Keep records. Grab a diary or logbook, and every day that you exercise, write down what you did and for how long. Your records will make it easy for you to see what you ve accomplished and make you more accountable. Blank pages? You d be ashamed.   9. Phone a friend. Find someone who likes the same activity that you do -- walking in the neighborhood, riding bikes, playing tennis -- and make a date to do it together.  Exercising with a friend or in a group can be very motivating,  Justyn says.  You are likely to walk longer or bike greater distances if you re talking to a friend along the way. The time will go by faster.  Don t have a jazmine who is available? Grab an MP3 player and listen to your favorite music or an audio book while exercising.   10. Do what you like. Whatever exercise you choose, be sure  it s one that you enjoy. You re more likely to stick with it if it s something you have fun doing rather than something you see as a chore, Justyn says.   If you can t fit 40 minutes a day into your schedule, get more exercise simply by being less efficient with your chores and adding a little extra walking distance everywhere you go. However, if you pick an activity you like, finding time for fitness will become effortless and the rewards enormous.   BOOKS ON WEIGHT LOSS  A course in Weight Loss by Cori Gatica    Eat, Drink, and Weigh Less by [place] researcher aTco Ceballos, and Misty Gamboa    Ultrametabolism by Scot Pelayo. Recommend keeping Carbohydrates to  30-45g/meal and 15g/snack with fiber of at least 4g/serving, protein goal of 60-90g/day. Keep food diary on myfitnesspal.com. Recommend pedometer with ultimate goal of 10,000 steps a day.     The Willpower Instinct by Betzy Singleton.    Finding Your Ideal Weight  Most of the people in magazines and on TV are far slimmer than average, yet this is the  ideal  that many people aim for. Before you decide that you won t be happy until you get down to a certain number of pounds, consider:    Your age. You probably wish you could get back to your college weight. But normal changes in metabolism and hormone levels that occur with aging make it unrealistic.    Your gender. In general, men have more muscle and heavier bones than women, which means that healthy men usually weigh more than healthy women of the same height.    Your current weight. If you are very heavy, focus on losing a smaller amount (such as 10 percent of your body weight). Losing just 5 to 10 pounds can improve your health.  Your Body Fat Percentage  A pound of muscle weighs the same as a pound of fat, but it takes up less space. Think of a trained athlete and a  couch potato.  Even though they may be the same height and weight, the athlete looks fitter, is healthier, and probably wears a  smaller size of clothing. If you are muscular, a body fat test may be a more accurate measure of your ideal weight than the bathroom scale. Talk to your healthcare provider, who can help you set appropriate goals for yourself.    Weight Management: Healthy Eating  Food is your body s fuel. You can t live without it. The key is to give your body enough nutrients and energy without eating too much. Reading food labels can help you make healthy choices. Also, learn new eating habits to manage your weight.      All the values on the label are based on one serving. The serving size is the average portion. Remember to multiply the values on the label by the number of servings you eat.   Eat Less Fat  A gram of fat has almost twice the calories of a gram of protein or carbohydrates. Try to balance your food choices so that 20% to 35% of your calories comes from total fat. This means an average of 2  to 3  grams of fat for each 100 calories you eat.  Eat More Fiber  High-fiber foods are digested more slowly than low-fiber foods, so you feel full longer. Try to get 31 grams of fiber each day. Foods high in fiber include:    Vegetables and fruits    Whole-grain or bran breads, pastas, and cereals    Legumes (beans) and peas  As you begin to eat more fiber, be sure to drink plenty of water to keep your digestive system working smoothly.  Tips    Don t skip meals. This often leads to overeating later on. It s best to spread your eating throughout the day.    Eat a variety of foods, not just a few favorites.    If you find yourself eating when you re not hungry, ask yourself why. Many of us eat when we re bored, stressed, or just to be polite. Listen to your body. If you re not hungry, get busy doing something else instead of eating.    Eat slower. It takes 20 minutes for your stomach to tell your brain that it s full.    Pay attention to what you eat. Don t read or watch TV during your meal.      Weight Management: Exercise and  Activity  Goal: at least 40 minutes daily  Studies show that people who exercise are the most likely to lose weight and keep it off. Exercise burns calories. It helps build muscle to make your body stronger. Make exercise part of your weight-management plan. You must hit moderate- to high cardiovascular workout for weight loss. Get your heart rate up to 75% to help you burn fat. Check online for free calculators.  Make Activity Part of Your Day  You may not think you have the time to exercise. But you can work activity into your daily life. Take 10 minutes out of your lunch hour to take a walk. Walk to the [employer] to get your paper instead of having it delivered. Make it a habit to take the stairs instead of the elevator. Park in the farthest parking spot instead of the closest. You ll be surprised at how fast these little changes can make a difference.  The Benefits of Exercise    Exercise increases your metabolism (the speed at which your body burns calories).    Regular exercise can increase the amount of muscle in your body. Muscle burns calories faster than fat. The more muscle you have, the more calories you burn.    Exercise gives you energy and curbs your appetite.    Exercise decreases stress and helps you sleep better.  Make Exercise Fun  Exercise can be fun. Choose an activity you enjoy. You may even get a friend to do it with you.    Take a resistance-training or aerobics class    Join a team sport    Take a dance class    Walk the dog    Ride a bike  If you have health problems, be sure to ask your doctor before you start an exercise program. Have a  help you develop a plan that s safe for you.     7076-4918 [facility], [address] [place] [address], [place] [address]. All rights reserved. This information is not intended as a substitute for professional medical care. Always follow your healthcare professional's instructions.    OTHER AVENUES TO INCREASE PHYSICAL FITNESS    Mapping  Walks and Runs, biking   www.mapmyrun.com   www.Spottly.com    Rating walkability of a neighborhood   www.walkFashionAde.com (Abundant Closet)    Treadmill Work Stations   www.Parallel Engines    Free Workouts at home   www.exercisetVetDC.Newgen Software Technologies    Walking, hiking, biking trails   www.ralistotrails.com    Active volunteer opportunities   www.HealthStream.Tandem    Race or walk/run events in your area   www.active.Zephyr    WorkoutDoubleBeam    www.Vibrant Energy/Med-Tek/   Pantech    Adventure Vacations   www.Okeo    Heart rate Monitors   www.polarusa.com    Pedometers   www.Spottly.Image Metrics    Outdoor treasure hunts on your own   www.LiveWire Tax    7 Worst Junk Ingredients to Avoid   (If you don't know what it is, it probably isn't good to eat it!)  1. Sodium Nitrate (also called Sodium Nitrite)  This is a preservative, coloring, and flavoring commonly found in processed meats like steve, ham, hot dogs, cold cuts and smoked fish. Studies have shown that it reacts with the body s digestive acids to form a cancer-causing agent called nitrosamines. So double-check that  healthy  turkey for carcinogens before you gobble down your sandwich!  2. Aspartame (aka NutraSweet/Equal)  In scientific terms, this is a chemical combination of two amino acids and methanol. It s better known by the brand names NutraSweet and Equal, which are sweeteners found in countless  diet  desserts, drink mixes, and soft drinks. Aspartame was once thought to be a safe artificial sweetener, but it is now believed to cause cancer and neurological problems such as dizziness and hallucinations.  3. Acesulfame-K  This artificial sweetener is 200 times sweeter than sugar and is often found in chewing gum and soft drinks. When tested in the laboratory, it caused cancer in rats. And that makes this additive a lot less sweet in our opinion!  4. Artificial Food Colorings  There are food colorings being used that are linked with cancer in animal testing as well as  behavioral disorders in children. These include Yellow 5, Red 40, Blue 1, Blue 2, Green 3, Orange B, Red 3 and Yellow 6. Amazingly, these colors have been banned in the [place] yet remain in many American foods. They can easily be avoided by choosing natural foods that aren t chemically or colorfully enhanced.  5. MSG  Monosodium Glutamate (MSG) is an amino acid used as a flavor enhancer in many soups, salad dressings, chips, frozen entrees and restaurant food. This nasty additive can ian with the nervous system causing side effects like migraines and overeating in some individuals. MSG appears on labels under several aliases, including yeast extract and calcium caseinate. It s even been found on the labels of organic products! Here s a list of the common aliases for MSG.    Monosodium Glutamate    Hydrolyzed Vegetable Protein    Hydrolyzed Protein    Hydrolyzed Plant Protein    Plant Protein Extract    Sodium Caseinate    Calcium Caseinate    Yeast Extract    Textured Protein    Autolyzed Yeast    Hydrolyzed Oat Flour    6. Trans Fats  Trans fats cause heart disease. It s a proven fact. Before purchasing any packaged food, check the ingredients list. Even if the label boasts  0g trans fats  BEWARE the product may still contain up to a 0.5g of trans fats per serving, if you see the words partially hydrogenated oils on the ingredients list. It s important to avoid even the smallest amount because it can raise your bad cholesterol and lower your good cholesterol, making you susceptible to all kinds of health problems!  7. Sodium Benzoate  Sodium benzoate is a preservative used in many foods and beverages. This ingredient is known to cause hives, asthma and other allergic reactions in sensitive individuals. New research shows that it may also cause behavioral disorders in children. One more reason to avoid this harmful ingredient: When used in beverages that also contain ascorbic acid (vitamin C) it forms  benzene, a known carcinogen. Some drink manufacturers are still using this toxic duo, so you may have benzene lurking in your favorite drink!                COUNSELING:  Reviewed preventive health counseling, as reflected in patient instructions       Regular exercise       Healthy diet/nutrition    Estimated body mass index is 42.77 kg/m  as calculated from the following:    Height as of this encounter: 1.524 m (5').    Weight as of this encounter: 99.3 kg (219 lb).    Weight management plan: Discussed healthy diet and exercise guidelines     reports that she has never smoked. She has never used smokeless tobacco.      Counseling Resources:  ATP IV Guidelines  Pooled Cohorts Equation Calculator  Breast Cancer Risk Calculator  FRAX Risk Assessment  ICSI Preventive Guidelines  Dietary Guidelines for Americans, 2010  USDA's MyPlate  ASA Prophylaxis  Lung CA Screening    Karyna Stockton, CNP  [facility]

## 2019-07-26 LAB
COPATH REPORT: NORMAL
PAP: NORMAL

## 2019-07-29 LAB
FINAL DIAGNOSIS: NORMAL
HPV HR 12 DNA CVX QL NAA+PROBE: NEGATIVE
HPV16 DNA SPEC QL NAA+PROBE: NEGATIVE
HPV18 DNA SPEC QL NAA+PROBE: NEGATIVE
SPECIMEN DESCRIPTION: NORMAL
SPECIMEN SOURCE CVX/VAG CYTO: NORMAL

## 2019-07-31 DIAGNOSIS — E66.01 MORBID OBESITY WITH BMI OF 40.0-44.9, ADULT (H): Chronic | ICD-10-CM

## 2019-07-31 DIAGNOSIS — Z13.6 CARDIOVASCULAR SCREENING; LDL GOAL LESS THAN 160: ICD-10-CM

## 2019-07-31 LAB
ANION GAP SERPL CALCULATED.3IONS-SCNC: 8 MMOL/L (ref 3–14)
BUN SERPL-MCNC: 8 MG/DL (ref 7–30)
CALCIUM SERPL-MCNC: 8.6 MG/DL (ref 8.5–10.1)
CHLORIDE SERPL-SCNC: 106 MMOL/L (ref 94–109)
CHOLEST SERPL-MCNC: 181 MG/DL
CO2 SERPL-SCNC: 23 MMOL/L (ref 20–32)
CREAT SERPL-MCNC: 0.74 MG/DL (ref 0.52–1.04)
GFR SERPL CREATININE-BSD FRML MDRD: >90 ML/MIN/{1.73_M2}
GLUCOSE SERPL-MCNC: 79 MG/DL (ref 70–99)
HDLC SERPL-MCNC: 69 MG/DL
LDLC SERPL CALC-MCNC: 73 MG/DL
NONHDLC SERPL-MCNC: 112 MG/DL
POTASSIUM SERPL-SCNC: 4 MMOL/L (ref 3.4–5.3)
SODIUM SERPL-SCNC: 137 MMOL/L (ref 133–144)
TRIGL SERPL-MCNC: 194 MG/DL

## 2019-07-31 PROCEDURE — 80048 BASIC METABOLIC PNL TOTAL CA: CPT | Performed by: NURSE PRACTITIONER

## 2019-07-31 PROCEDURE — 36415 COLL VENOUS BLD VENIPUNCTURE: CPT | Performed by: NURSE PRACTITIONER

## 2019-07-31 PROCEDURE — 80061 LIPID PANEL: CPT | Performed by: NURSE PRACTITIONER

## 2019-07-31 NOTE — RESULT ENCOUNTER NOTE
Dear Sebastian,  DEVENDRA- normal  Lipids- stable, except your triglycerides have gone up significantly. We'll keep an eye on this.  Read the following for more information:  [email or web address]    Please contact our clinic via phone or My Chart if you have any questions or concerns.  Thanks,  DAVIDE Maxwell

## 2019-08-02 ENCOUNTER — MYC MEDICAL ADVICE (OUTPATIENT)
Dept: FAMILY MEDICINE | Facility: CLINIC | Age: 39
End: 2019-08-02

## 2019-08-02 NOTE — TELEPHONE ENCOUNTER
Left message for this patient to call back, need the daughter's information/ MRN number to send to Karyna, see below.      Karyna Stockton, CNP  You 54 minutes ago (1:10 PM)      I think I remember placing an order. I don't recall paperwork. I need an MRN number.   Karyna Stockton, DAVIDE Shields, STUART

## 2019-11-04 ENCOUNTER — HEALTH MAINTENANCE LETTER (OUTPATIENT)
Age: 39
End: 2019-11-04

## 2020-03-25 ENCOUNTER — E-VISIT (OUTPATIENT)
Dept: FAMILY MEDICINE | Facility: CLINIC | Age: 40
End: 2020-03-25
Payer: COMMERCIAL

## 2020-03-25 DIAGNOSIS — F32.81 PMDD (PREMENSTRUAL DYSPHORIC DISORDER): ICD-10-CM

## 2020-03-25 DIAGNOSIS — R45.89 DEPRESSED AFFECT: ICD-10-CM

## 2020-03-25 DIAGNOSIS — E66.01 MORBID OBESITY WITH BMI OF 40.0-44.9, ADULT (H): Primary | Chronic | ICD-10-CM

## 2020-03-25 DIAGNOSIS — R53.83 FATIGUE, UNSPECIFIED TYPE: ICD-10-CM

## 2020-03-25 PROCEDURE — 99421 OL DIG E/M SVC 5-10 MIN: CPT | Performed by: NURSE PRACTITIONER

## 2020-03-26 DIAGNOSIS — E66.01 MORBID OBESITY WITH BMI OF 40.0-44.9, ADULT (H): Chronic | ICD-10-CM

## 2020-03-26 DIAGNOSIS — F32.81 PMDD (PREMENSTRUAL DYSPHORIC DISORDER): ICD-10-CM

## 2020-03-26 DIAGNOSIS — R53.83 FATIGUE, UNSPECIFIED TYPE: ICD-10-CM

## 2020-03-26 DIAGNOSIS — R45.89 DEPRESSED AFFECT: ICD-10-CM

## 2020-03-26 LAB
ALBUMIN SERPL-MCNC: 3.5 G/DL (ref 3.4–5)
ALP SERPL-CCNC: 82 U/L (ref 40–150)
ALT SERPL W P-5'-P-CCNC: 25 U/L (ref 0–50)
ANION GAP SERPL CALCULATED.3IONS-SCNC: 6 MMOL/L (ref 3–14)
AST SERPL W P-5'-P-CCNC: 17 U/L (ref 0–45)
BASOPHILS # BLD AUTO: 0 10E9/L (ref 0–0.2)
BASOPHILS NFR BLD AUTO: 0.4 %
BILIRUB SERPL-MCNC: 0.2 MG/DL (ref 0.2–1.3)
BUN SERPL-MCNC: 10 MG/DL (ref 7–30)
CALCIUM SERPL-MCNC: 9.3 MG/DL (ref 8.5–10.1)
CHLORIDE SERPL-SCNC: 106 MMOL/L (ref 94–109)
CO2 SERPL-SCNC: 24 MMOL/L (ref 20–32)
CREAT SERPL-MCNC: 0.62 MG/DL (ref 0.52–1.04)
DIFFERENTIAL METHOD BLD: NORMAL
EOSINOPHIL # BLD AUTO: 0.2 10E9/L (ref 0–0.7)
EOSINOPHIL NFR BLD AUTO: 2.5 %
ERYTHROCYTE [DISTWIDTH] IN BLOOD BY AUTOMATED COUNT: 12.7 % (ref 10–15)
FOLATE SERPL-MCNC: 13.9 NG/ML
GFR SERPL CREATININE-BSD FRML MDRD: >90 ML/MIN/{1.73_M2}
GLUCOSE SERPL-MCNC: 77 MG/DL (ref 70–99)
HCT VFR BLD AUTO: 43.3 % (ref 35–47)
HGB BLD-MCNC: 14.6 G/DL (ref 11.7–15.7)
LYMPHOCYTES # BLD AUTO: 3.1 10E9/L (ref 0.8–5.3)
LYMPHOCYTES NFR BLD AUTO: 36.6 %
MCH RBC QN AUTO: 28.4 PG (ref 26.5–33)
MCHC RBC AUTO-ENTMCNC: 33.7 G/DL (ref 31.5–36.5)
MCV RBC AUTO: 84 FL (ref 78–100)
MONOCYTES # BLD AUTO: 0.6 10E9/L (ref 0–1.3)
MONOCYTES NFR BLD AUTO: 7.2 %
NEUTROPHILS # BLD AUTO: 4.4 10E9/L (ref 1.6–8.3)
NEUTROPHILS NFR BLD AUTO: 53.3 %
PLATELET # BLD AUTO: 344 10E9/L (ref 150–450)
POTASSIUM SERPL-SCNC: 4.1 MMOL/L (ref 3.4–5.3)
PROLACTIN SERPL-MCNC: 6 UG/L (ref 3–27)
PROT SERPL-MCNC: 8.1 G/DL (ref 6.8–8.8)
RBC # BLD AUTO: 5.14 10E12/L (ref 3.8–5.2)
SODIUM SERPL-SCNC: 136 MMOL/L (ref 133–144)
TSH SERPL DL<=0.005 MIU/L-ACNC: 2.93 MU/L (ref 0.4–4)
VIT B12 SERPL-MCNC: 689 PG/ML (ref 193–986)
WBC # BLD AUTO: 8.3 10E9/L (ref 4–11)

## 2020-03-26 PROCEDURE — 82627 DEHYDROEPIANDROSTERONE: CPT | Performed by: NURSE PRACTITIONER

## 2020-03-26 PROCEDURE — 80050 GENERAL HEALTH PANEL: CPT | Performed by: NURSE PRACTITIONER

## 2020-03-26 PROCEDURE — 84146 ASSAY OF PROLACTIN: CPT | Performed by: NURSE PRACTITIONER

## 2020-03-26 PROCEDURE — 84403 ASSAY OF TOTAL TESTOSTERONE: CPT | Performed by: NURSE PRACTITIONER

## 2020-03-26 PROCEDURE — 36415 COLL VENOUS BLD VENIPUNCTURE: CPT | Performed by: NURSE PRACTITIONER

## 2020-03-26 PROCEDURE — 83498 ASY HYDROXYPROGESTERONE 17-D: CPT | Performed by: NURSE PRACTITIONER

## 2020-03-26 PROCEDURE — 82306 VITAMIN D 25 HYDROXY: CPT | Performed by: NURSE PRACTITIONER

## 2020-03-26 PROCEDURE — 82607 VITAMIN B-12: CPT | Performed by: NURSE PRACTITIONER

## 2020-03-26 PROCEDURE — 82746 ASSAY OF FOLIC ACID SERUM: CPT | Performed by: NURSE PRACTITIONER

## 2020-03-26 NOTE — PATIENT INSTRUCTIONS
1. Morbid obesity with BMI of 40.0-44.9, adult (H)  Chronic, worsened    Complete labs first  - Comprehensive metabolic panel; Future  - Testosterone total; Future  - DHEA sulfate; Future  - 17 OH progesterone; Future  - Prolactin; Future    2. PMDD (premenstrual dysphoric disorder)  Chronic, stable  - Testosterone total; Future  - DHEA sulfate; Future  - 17 OH progesterone; Future  - Prolactin; Future    We'll do labs for PCOS, or adrenal dysfunction, as well as thyroid dysfunction    3. Fatigue, unspecified type  Acute, stable  - CBC with platelets differential; Future  - Folate; Future  - TSH with free T4 reflex; Future  - Vitamin B12; Future    4. Depressed affect  Chronic, on and off  - Vitamin D Deficiency; Future    A staff member will call you today to complete two depression screenings    You've been on Prozac before, would you like to restart this?  Certain things I'd like you to do if you're not already:  1) Exercise daily- even if you don't feel like it, do it.   2) Start Vitamin D3 2,000 IU daily with Vitamin B12 1,200 mg daily if you have stopped  3) Start a daily stress reduction technique- 5 minutes: prayer, yoga, mindful meditation leelee    Follow self-care tips below    Depression or Dysthymia    Depression is common    Depression is associated with emotional symptoms (mood changes) as well as physical symptoms (such as fatigue, headache, pain, muscle tension, sleep disturbance, weight changes)    Depression is associated with biologic changes in the neurochemistry of the brain    Treatment options for depression- studies have shown that each of these may shorten the course of depression and improve symptoms.     Start taking Vitamin D 2000 IU daily and Vitamin B12 1,000 mg daily    Talk with a friend or family member.    Side effects of antidepressant medication include nervousness/ jitteriness, changes in sleep, headache, stomach upset- these typically improve after the first week.    Sexual side  "effects may occur as well    It may take 2-6 weeks for the medication to have full effect, and we may need to increase the dose with time    Please do not stop the medication without first discussing with me. Treating for a minimum of 6-12 months is recommended even if you start to feel better sooner than this.  Treatment of 6-12 months is associated with a lower risk of relapse.  If you have increased suicidal thoughts or develop a plan to commit suicide:  ? Call the Crisis Line 1-430.728.1516   Coping with Depression  Dysthymia is usually mild depression that lasts for at least two years (or at least one year in children).  You may feel sad and tired almost every day. You might have low self-esteem. You may worry that you will never feel \"normal\" again.  Symptoms may include:    Feeling hopeless, as if nothing matters.    Feeling shame or guilt.    Problems concentrating or making choices. You may forget things often.    Feeling on edge or quick to anger.    Problems sleeping or sleeping too much. You get tired easily.    Eating much more or less than usual.    Loss of interest in the things you used to enjoy, including sex.    Physical problems that don't seem to respond to treatment.    Problems at work or school.    Avoiding others or having conflicts with others.  What causes it?  No one knows for sure. It often comes on slowly after a major depression.  Dysthymia tends to run in families. It is most likely to begin in your teens or early twenties. It is more common in women than men.  How is it treated?  Dysthymia can be treated. See your doctor about this problem.  First, your doctor will do a full medical exam. This may show if your symptoms are caused by a medical problem or by any medicine you may be taking. After ruling out a medical cause, your doctor may suggest a number of treatments.  Self-care      It will take time for your mood to improve. Change will not come quickly. But if you keep up with your " treatment, even when you feel well, symptoms will be less severe  and come less often.    Don't stop taking your medicines, even if you feel better. Take them every day as your doctor told you. If you suddenly stop taking them, it can cause problems.    Go to all of your counseling sessions. Together, you and your therapist will decide when therapy should end.    Get the support you need. Ask your friends and family for help. Join a support group. Try not to spend too much time alone.    Keep healthy routines every day. Be sure to eat regular, healthy meals.     Try to get eight hours of sleep each night.     Get plenty of daily exercise.    Engage with your support system (family, friends, Religious, etc)    Control your stress. Try deep breathing, massage therapy, biofeedback and meditation. Find time to relax each day.    Stay away from caffeine, alcohol, tobacco and other drugs.    Focus more on the good things in your life. Each time you have a negative thought, try to think of something good. Balance your thoughts as much as you can.    Find activities that make you feel better.    Talk to your doctor before taking any new medicine. This includes birth control pills. Your doctor should know if you have ever been treated for depression. Find out if the new drug will make your depression worse.  Therapy (counseling)  Therapy can help you explore the source of your depression. You will learn methods for changing the way you feel and think. And with the support of a therapist, you will have more control over your symptoms.  Medicine  Your doctor may suggest medicine for depression. It could take up to six weeks for the medicine to work.  Be sure to tell your doctor about any side effects you may have. We may need to change your dose to relieve your symptoms. Do not stop taking medicine unless your doctor tells you to.  Alternative treatments  Meditation and mental imagery will help you empty your mind of thoughts and  worries. There are books and tapes to help you learn how to meditate. This may help improve your mood. Massage therapy can also help relax tense muscles and relieve stress.  When should I call the doctor?    You feel you could hurt yourself or someone else.    You have symptoms of depression again after being treated for depression in the past.  Where can I get more information?  [facility]: [phone]   ([facility]) or [phone] ([facility]: [phone]   or [phone]  Depression and Bipolar Support [place]:  [phone], www.dbsalliance.org  National [place] of Mental Health:  508.168.9587, www.nimh.nih.gov  National [place] on Mental Illness:  213-568-EZQM [6661], www.LISA.org  National Mental Health Association:  183-338-SEJT [3864], www.NMHA.org  For informational purposes only. Not to replace the advice of your health care provider. Copyright   2006. [facility]. All rights reserved. Clinically reviewed by Depression Care Package Team. [employer] 280407 - REV 06/18.

## 2020-03-26 NOTE — TELEPHONE ENCOUNTER
"Complaint: Tired, weight gain, depressed, lack of motivation    Patient notes, \"Curious if maybe i may have pcos?  I have been completely exhausted for no reason since work has been really slow until recently. my periods have been really off, I am yelena to have it for maybe 3 days and have very little in those days. I have been down and depressed more than normal. I have been gaining weight lately even though I have cut out pop (which i drank all day) except like 2 a day now. been working out almost every day for at least a half hour. I have been trying to eat less and better. I just can't figure out what is going on with me. I have no motivation to do much most days.\"        3/14/2019 Outbursts had stopped    12/6/2018 Mood swings around period  Plan:   1. PMDD (premenstrual dysphoric disorder)  Acute  - FLUoxetine (PROZAC) 10 MG capsule; Take 1 capsule (10 mg) by mouth daily X 7 days before your period  Dispense: 30 capsule; Refill: 0  Vitamin D3 5,000 IU daily Nature Made brand  Vitamin B12 1,000 mg daily Nature [employer] brand   Could try Zoloft or Paxil or OC containing drospirenone 3 mg + EE 30 mcg (Maye)  Her insurance will only cover #21 tablets       3/9/2017 Abnormal mood swings Alex Mix  Visit Diagnoses      Anxiety attack    -  Primary     Relevant Medications     hydrOXYzine (ATARAX) 25 MG tablet     Other Relevant Orders     MENTAL HEALTH REFERRAL     Chest pressure         Relevant Orders     EKG 12-lead complete w/read - Clinics (Completed)     Unexplained weight gain         Relevant Orders     TSH with free T4 reflex (Completed)       PHQ 4/12/2018 12/6/2018 3/14/2019   PHQ-9 Total Score 6 12 7   Q9: Thoughts of better off dead/self-harm past 2 weeks Several days Not at all Not at all   F/U: Thoughts of suicide or self-harm No - -   F/U: Safety concerns No - -     FIDENCIO-7 SCORE 4/12/2018 12/6/2018 3/14/2019   Total Score - - -   Total Score - - 8 (mild anxiety)   Total Score 7 12 8         Provider " E-Visit time total (minutes): 10

## 2020-03-27 LAB
DEPRECATED CALCIDIOL+CALCIFEROL SERPL-MC: 33 UG/L (ref 20–75)
DHEA-S SERPL-MCNC: 127 UG/DL (ref 35–430)

## 2020-03-28 LAB — TESTOST SERPL-MCNC: 18 NG/DL (ref 8–60)

## 2020-03-30 LAB — 17OHP SERPL-MCNC: <10 NG/DL

## 2020-03-30 NOTE — RESULT ENCOUNTER NOTE
Dear Sebastian,  Folate- normal  Vitamin D- low end of normal  Vitamin B12- normal  CBC- normal  TSH- normal  Testosterone - normal  Progesterone- normal  DHEAS- normal  Prolactin- normal  CMP- normal  Your labs are all normal, and do not indicate PCOS, adrenal dysfunction, or thyroid dysfunction  Definitely re-start Vitamin D3 at 2,000 IU daily  If you would like I could place a referral for weight management.  Your depression and anxiety screenings indicate you might need therapy and possibly medication management. I'd suggest scheduling a telephone visit sometime later this week to discuss a plan.   Please contact our clinic via phone or My Chart if you have any questions or concerns.  Thanks,  DAVIDE Maxwell

## 2020-04-13 NOTE — PROGRESS NOTES
"  SUBJECTIVE   Sebastian Schuster is a 39 year old female who is being evaluated via a  billable telephone visit with complaint of  Weight Problem      The patient has been notified of following:   \"This telephone visit will be conducted via a call between you and your physician/provider. We have found that certain health care needs can be provided without the need for a physical exam.  This service lets us provide the care you need with a short phone conversation.  If a prescription is necessary we can send it directly to your pharmacy.  If lab work is needed we can place an order for that and you can then stop by our lab to have the test done at a later time.    Telephone visits are billed at different rates depending on your insurance coverage. During this emergency period, for some insurers they may be billed the same as an in-person visit.  Please reach out to your insurance provider with any questions.    If during the course of the call the physician/provider feels a telephone visit is not appropriate, you will not be charged for this service.\"    Patient has given verbal consent for Telephone visit?  Yes    How would you like to obtain your AVS? Malinda Hart MA  (MA signature)    Weight concern  Recommended weight management referral- she was to phone back and let me know      Duration: about 6 months    Description (location/character/radiation): NA    Intensity:  NA    Accompanying signs and symptoms: NA    History (similar episodes/previous evaluation): None    Precipitating or alleviating factors: Patient has been working out- 3-5 days a week for 30 minutes    Therapies tried and outcome: None    Not interested in bariatric surgery at this time     Estimated body mass index is 42.77 kg/m  as calculated from the following:    Height as of 7/23/19: 1.524 m (5').    Weight as of 7/23/19: 99.3 kg (219 lb).    Wt Readings from Last 10 Encounters:   07/23/19 99.3 kg (219 lb)   03/14/19 102.1 kg (225 lb) "   12/06/18 99.3 kg (219 lb)   09/27/18 98.1 kg (216 lb 3.2 oz)   06/29/18 98.9 kg (218 lb)   04/11/17 92.1 kg (203 lb)   03/09/17 90.7 kg (200 lb)   02/28/17 92.1 kg (203 lb)   01/04/17 90.5 kg (199 lb 9.6 oz)   11/19/15 87.1 kg (192 lb)       Abnormal Mood Symptoms  Onset: 3-6 months    Description:   Depression: YES  Anxiety: YES    Accompanying Signs & Symptoms:  Still participating in activities that you used to enjoy: YES  Fatigue: YES  Irritability: YES  Difficulty concentrating: no  Changes in appetite: no  Problems with sleep: no  Heart racing/beating fast : no  Thoughts of hurting yourself or others: none    History:   Recent stress: no  Prior depression hospitalization: None  Family history of depression: no  Family history of anxiety: no    Precipitating factors:   Alcohol/drug use: no    Alleviating factors:  none  Therapies Tried and outcome: None    Much improved with exercise and supplements  PHQ 3/14/2019 3/26/2020 4/15/2020   PHQ-9 Total Score 7 16 2   Q9: Thoughts of better off dead/self-harm past 2 weeks Not at all Not at all Not at all   F/U: Thoughts of suicide or self-harm - - -   F/U: Safety concerns - - -     FIDENCIO-7 SCORE 3/14/2019 3/26/2020 4/15/2020   Total Score - - -   Total Score 8 (mild anxiety) - -   Total Score 8 12 1       PLAN (from 3/26/2020):  Certain things I'd like you to do if you're not already:  1) Exercise daily- even if you don't feel like it, do it.   2) Start Vitamin D3 2,000 IU daily with Vitamin B12 1,200 mg daily if you have stopped  3) Start a daily stress reduction technique- 5 minutes: prayer, yoga, mindful meditation leelee    Follow self-care tips below        -----------------------------------Roomer end here--------------------    PCP   Karyna Stockton, [phone]    Patient Active Problem List   Diagnosis     CARDIOVASCULAR SCREENING; LDL GOAL LESS THAN 160- elevated trilgycerides 7/2019     Morbid obesity with BMI of 40.0-44.9, adult (H)     PMDD  (premenstrual dysphoric disorder)     HX of External hemorrhoids     Mood changes     Current Outpatient Medications   Medication     Cyanocobalamin (VITAMIN B-12 PO)     ENSKYCE 0.15-30 MG-MCG tablet     phentermine (ADIPEX-P) 37.5 MG capsule     VITAMIN D, CHOLECALCIFEROL, PO     No current facility-administered medications for this visit.        ALLERGIES:  Augmentin; Penicillins; and Lavender oil    Reviewed and updated as needed this visit by Provider:  Tobacco  Allergies  Meds  Med Hx  Surg Hx  Fam Hx  Soc Hx     ASSESSMENT    Reported vitals:  No LMP recorded.     healthy, alert and no distress  PSYCH: Alert and oriented times 3; coherent speech, normal   rate and volume, able to articulate logical thoughts, able   to abstract reason, no tangential thoughts, no hallucinations   or delusions  Her affect is normal and pleasant  RESP: No cough, no audible wheezing, able to talk in full sentences  Remainder of exam unable to be completed due to telephone visits    PLAN     1. Mood changes  Chronic, improved  Continue Vitamin B12 and Vitamin D    2. Weight gain  Chronic, worsened  START- phentermine (ADIPEX-P) 37.5 MG capsule; Take 1 capsule (37.5 mg) by mouth every morning  Dispense: 30 capsule; Refill: 0  - COMPREHENSIVE WEIGHT MANAGEMENT referral- they will call you  - CMP- future lab    They recommend you call your insurance and let them know you have been referred to a comprehensive weight management program    (Looks like [place] location is not set up yet, but the [facility] is the main hub)    Check blood pressure in 2-3 weeks at [facility] (make sure they use manual blood pressure cuff- not automatic)    Check labs in 2-3 weeks at [facility]    Continue exercise- goal to get up to 60 minutes of moderate intensity daily    You could see how many calories you eat in a day (DocLogix- free leelee) as a baseline    Eat breakfast daily    Get in 5 minutes of mindful meditation    3. Morbid obesity  with BMI of 40.0-44.9, adult (H)  Chronic, worsened  - phentermine (ADIPEX-P) 37.5 MG capsule; Take 1 capsule (37.5 mg) by mouth every morning  Dispense: 30 capsule; Refill: 0  - COMPREHENSIVE WEIGHT MANAGEMENT        HOW TO BE HEALTHIER    Tools to use: Tribzi www.hi5- FREE website that helps you with food choices, and exercise. You can talk with people, talk to trainers and dieticians.    Increase your water intake daily to 6 glasses     Purchase a pedometer that will monitor how many steps you take (10, 000 daily is a goal for everyone)- these are sometimes included in smart phones.    Use meal replacements such as Aliyah's meals, Lean Cuisines, Healthy Choice, Smart Ones, Weight Watchers Meals, Slim Fast and Glucerna shakes and supplement with fresh fruits and vegetables      Please drink a lot of water daily. Most people typically need about 2 liters of water daily and more if they are exercising, have a large weight, or have a fever or illness. Add Crystal Light for flavoring if desired. But no pop with calories in it.      Keep a food journal of what you eat, calories in what you eat, and mood. You will see where you are getting the majority of your calories. Bring the journal with you to your nutrition appointment (if you are being referred).      Fitness apps: MyFitnessPal, LifeFitness, Fitbit, Sworkit Fitness(simple to-go fitness), High Intensity HIIT workouts,  NEOU (life and on-demand videos,       Food apps: Weight Watchers, SparkRecipes, LoseIt, Tap&Track, Fooducate, and HealthyOut (restaurant food tracking).      Focus on wet volumetrics, meaning, eat more foods that are high in water and fiber such as fruits and vegetables in order to get that full feeling. These are also good for your overall health as well.      Food delivery Let's [facility] to help you prepare meals for you and your family. Often times, the caloric and nutrition data and serving sizes are available for this food. This  can be a time saving maneuver. The website can give you more information http://www.PhoneJoy Solutions.Aujas Networks/. [employer] has and delivers Let's Dish & fresh low calorie salads      Food delivery Home  www.homechef.com      Food delivery Green  www.greenchef.com      Food delivery Hello Fresh at https://www.helloTunespeakesh.Aujas Networks/food-boxes/classic-box/      Try Cooking Light recipes for low calorie meal preparation and planning      Diabetic carb counting: CalorieKing   http://www.calorieking.Aujas Networks/      Other food plan options you can search for on the internet and check out include: Paul LOMELI, [facility]    Here are 10 ways to get you moving more often:   1. Be less efficient. People typically try to think of ways to make daily tasks easier. But if we make them harder, we can get more exercise, says Brooke Gabriel, MS, of [employer], a , , and spokeswoman for the [employer] [place] on Exercise (ACE).  Bring in the groceries from your car one bag at a time so you have to make several trips,  Harvey says.  Put the laundry away a few items at a time, rather than carrying it up in a basket.    2. Shun labor-saving devices. Wash the car by hand rather than taking it to the car wash.  It takes about an hour and a half to do a good job, and in the meantime you ve gotten great exercise,  Harvey says. Use a push mower rather than a riding mower to groom your lawn.   3. Going somewhere? Take the long way. Walking up or down a few flights of stairs each day can be good for your heart. Avoid elevators and escalators whenever possible. If you ride the bus or subway to work, get off a stop before your office and walk the extra distance. When you go to the mall or the grocery store, park furthest from the entrance, not as close to it as you can, and you'll get a few extra minutes of walking -- one of the best exercises there is, Dr. Alejandra says.  Walking is great because anyone can  do it and you don t need any special equipment other than a properly fitting pair of sneakers.    4. Be a morning person. Studies show that people who exercise in the morning are more likely to stick with it. As Harvey explains,  Are you going to feel like exercising at the end of a hard day? Probably not. If you do your workout in the morning, you re not only more likely to do it, but you'll also set a positive tone for the day.    5. Ink the deal. Whether morning, afternoon, or evening, pick the time that is most convenient for you to exercise and write it down in your daily planner. Keep your exercise routine as you would keep any appointment.   6. Watch your step. Investing in a good pedometer can help you stay motivated.  If you have a pedometer attached to your waist and you can see how many steps you ve taken, you ll see it doesn t take long to walk 5,000 steps and you will be inspired,  Harvey says. And building up to 10,000 steps a day won t seem like such a daunting a task.   7. Hire the right help. While weight training is important, if you don t know what you re doing, you run the risk of injuring yourself or not being effective, Harvey says. It s best to get instructions from a  at the gym. You also can buy a weight-training DVD and follow along in your living room.   8. Keep records. Grab a diary or logbook, and every day that you exercise, write down what you did and for how long. Your records will make it easy for you to see what you ve accomplished and make you more accountable. Blank pages? You d be ashamed.   9. Phone a friend. Find someone who likes the same activity that you do -- walking in the neighborhood, riding bikes, playing tennis -- and make a date to do it together.  Exercising with a friend or in a group can be very motivating,  Justyn says.  You are likely to walk longer or bike greater distances if you re talking to a friend along the way. The time will go by  faster.  Don t have a jazmine who is available? Grab an MP3 player and listen to your favorite music or an audio book while exercising.   10. Do what you like. Whatever exercise you choose, be sure it s one that you enjoy. You re more likely to stick with it if it s something you have fun doing rather than something you see as a chore, Justyn says.   If you can t fit 40 minutes a day into your schedule, get more exercise simply by being less efficient with your chores and adding a little extra walking distance everywhere you go. However, if you pick an activity you like, finding time for fitness will become effortless and the rewards enormous.   BOOKS ON WEIGHT LOSS  A course in Weight Loss by Cori Gatica    Eat, Drink, and Weigh Less by [place] researcher Taco Ceballos, and Misty Gamboa    Ultrametabolism by Scot Pelayo. Recommend keeping Carbohydrates to  30-45g/meal and 15g/snack with fiber of at least 4g/serving, protein goal of 60-90g/day. Keep food diary on myfitnesspal.com. Recommend pedometer with ultimate goal of 10,000 steps a day.     The Willpower Instinct by Betzy Singleton.    Finding Your Ideal Weight  Most of the people in magazines and on TV are far slimmer than average, yet this is the  ideal  that many people aim for. Before you decide that you won t be happy until you get down to a certain number of pounds, consider:    Your age. You probably wish you could get back to your college weight. But normal changes in metabolism and hormone levels that occur with aging make it unrealistic.    Your gender. In general, men have more muscle and heavier bones than women, which means that healthy men usually weigh more than healthy women of the same height.    Your current weight. If you are very heavy, focus on losing a smaller amount (such as 10 percent of your body weight). Losing just 5 to 10 pounds can improve your health.  Your Body Fat Percentage  A pound of muscle weighs the same as a pound of  fat, but it takes up less space. Think of a trained athlete and a  couch potato.  Even though they may be the same height and weight, the athlete looks fitter, is healthier, and probably wears a smaller size of clothing. If you are muscular, a body fat test may be a more accurate measure of your ideal weight than the bathroom scale. Talk to your healthcare provider, who can help you set appropriate goals for yourself.    Weight Management: Healthy Eating  Food is your body s fuel. You can t live without it. The key is to give your body enough nutrients and energy without eating too much. Reading food labels can help you make healthy choices. Also, learn new eating habits to manage your weight.      All the values on the label are based on one serving. The serving size is the average portion. Remember to multiply the values on the label by the number of servings you eat.   Eat Less Fat  A gram of fat has almost twice the calories of a gram of protein or carbohydrates. Try to balance your food choices so that 20% to 35% of your calories comes from total fat. This means an average of 2  to 3  grams of fat for each 100 calories you eat.  Eat More Fiber  High-fiber foods are digested more slowly than low-fiber foods, so you feel full longer. Try to get 31 grams of fiber each day. Foods high in fiber include:    Vegetables and fruits    Whole-grain or bran breads, pastas, and cereals    Legumes (beans) and peas  As you begin to eat more fiber, be sure to drink plenty of water to keep your digestive system working smoothly.  Tips    Don t skip meals. This often leads to overeating later on. It s best to spread your eating throughout the day.    Eat a variety of foods, not just a few favorites.    If you find yourself eating when you re not hungry, ask yourself why. Many of us eat when we re bored, stressed, or just to be polite. Listen to your body. If you re not hungry, get busy doing something else instead of  eating.    Eat slower. It takes 20 minutes for your stomach to tell your brain that it s full.    Pay attention to what you eat. Don t read or watch TV during your meal.      Weight Management: Exercise and Activity  Goal: at least 40 minutes daily  Studies show that people who exercise are the most likely to lose weight and keep it off. Exercise burns calories. It helps build muscle to make your body stronger. Make exercise part of your weight-management plan. You must hit moderate- to high cardiovascular workout for weight loss. Get your heart rate up to 75% to help you burn fat. Check online for free calculators.  Make Activity Part of Your Day  You may not think you have the time to exercise. But you can work activity into your daily life. Take 10 minutes out of your lunch hour to take a walk. Walk to the Go Try It On to get your paper instead of having it delivered. Make it a habit to take the stairs instead of the elevator. Park in the farthest parking spot instead of the closest. You ll be surprised at how fast these little changes can make a difference.  The Benefits of Exercise    Exercise increases your metabolism (the speed at which your body burns calories).    Regular exercise can increase the amount of muscle in your body. Muscle burns calories faster than fat. The more muscle you have, the more calories you burn.    Exercise gives you energy and curbs your appetite.    Exercise decreases stress and helps you sleep better.  Make Exercise Fun  Exercise can be fun. Choose an activity you enjoy. You may even get a friend to do it with you.    Take a resistance-training or aerobics class    Join a team sport    Take a dance class    Walk the dog    Ride a bike  If you have health problems, be sure to ask your doctor before you start an exercise program. Have a  help you develop a plan that s safe for you.     7753-0194 [facility], [address], [place] [address]. All rights  reserved. This information is not intended as a substitute for professional medical care. Always follow your healthcare professional's instructions.    OTHER AVENUES TO INCREASE PHYSICAL FITNESS    Mapping Walks and Runs, biking   www.mapmyrun.com   www.[place].com    Rating walkability of a neighborhood   www.walkBuySimple    Treadmill Work Stations   www.AdChina    Free Workouts at home   www.[employer] Marketing [employer] Media Group.TruClinic    Walking, hiking, biking trails   www.ralistotrails.com    Active volunteer opportunities   www.Trudev    Race or walk/run events in your area   www.active.come    WorkoutOSR Open Systems Resources    wwwPentalum Technologies/The Kimberly Organization/   www.Impel NeuroPharma    Adventure Vacations   www.Visual Revenue    Heart rate Monitors   www.polarusa.com    Pedometers   www.Midwest Micro Devices    Outdoor treasure hunts on your own   www.Octro    7 Worst Junk Ingredients to Avoid   (If you don't know what it is, it probably isn't good to eat it!)  1. Sodium Nitrate (also called Sodium Nitrite)  This is a preservative, coloring, and flavoring commonly found in processed meats like steve, ham, hot dogs, cold cuts and smoked fish. Studies have shown that it reacts with the body s digestive acids to form a cancer-causing agent called nitrosamines. So double-check that  healthy  turkey for carcinogens before you gobble down your sandwich!  2. Aspartame (aka NutraSweet/Equal)  In scientific terms, this is a chemical combination of two amino acids and methanol. It s better known by the brand names NutraSweet and Equal, which are sweeteners found in countless  diet  desserts, drink mixes, and soft drinks. Aspartame was once thought to be a safe artificial sweetener, but it is now believed to cause cancer and neurological problems such as dizziness and hallucinations.  3. Acesulfame-K  This artificial sweetener is 200 times sweeter than sugar and is often found in chewing gum and soft drinks. When tested in the laboratory, it caused  cancer in rats. And that makes this additive a lot less sweet in our opinion!  4. Artificial Food Colorings  There are food colorings being used that are linked with cancer in animal testing as well as behavioral disorders in children. These include Yellow 5, Red 40, Blue 1, Blue 2, Green 3, Orange B, Red 3 and Yellow 6. Amazingly, these colors have been banned in the [place] yet remain in many American foods. They can easily be avoided by choosing natural foods that aren t chemically or colorfully enhanced.  5. MSG  Monosodium Glutamate (MSG) is an amino acid used as a flavor enhancer in many soups, salad dressings, chips, frozen entrees and restaurant food. This nasty additive can ian with the nervous system causing side effects like migraines and overeating in some individuals. MSG appears on labels under several aliases, including yeast extract and calcium caseinate. It s even been found on the labels of organic products! Here s a list of the common aliases for MSG.    Monosodium Glutamate    Hydrolyzed Vegetable Protein    Hydrolyzed Protein    Hydrolyzed Plant Protein    Plant Protein Extract    Sodium Caseinate    Calcium Caseinate    Yeast Extract    Textured Protein    Autolyzed Yeast    Hydrolyzed Oat Flour    6. Trans Fats  Trans fats cause heart disease. It s a proven fact. Before purchasing any packaged food, check the ingredients list. Even if the label boasts  0g trans fats  BEWARE the product may still contain up to a 0.5g of trans fats per serving, if you see the words partially hydrogenated oils on the ingredients list. It s important to avoid even the smallest amount because it can raise your bad cholesterol and lower your good cholesterol, making you susceptible to all kinds of health problems!  7. Sodium Benzoate  Sodium benzoate is a preservative used in many foods and beverages. This ingredient is known to cause hives, asthma and other allergic reactions in sensitive individuals. New  research shows that it may also cause behavioral disorders in children. One more reason to avoid this harmful ingredient: When used in beverages that also contain ascorbic acid (vitamin C) it forms benzene, a known carcinogen. Some drink manufacturers are still using this toxic duo, so you may have benzene lurking in your favorite drink!              Risks, benefits, side effects and rationale for treatment plan fully discussed with the patient and understanding expressed. I have reviewed the note as documented above.  This accurately captures the substance of my conversation with the patient.  MACEY Torres    Total time of call between patient and provider was 9 minutes.

## 2020-04-15 ENCOUNTER — VIRTUAL VISIT (OUTPATIENT)
Dept: FAMILY MEDICINE | Facility: CLINIC | Age: 40
End: 2020-04-15
Payer: COMMERCIAL

## 2020-04-15 DIAGNOSIS — R45.86 MOOD CHANGES: Primary | ICD-10-CM

## 2020-04-15 DIAGNOSIS — E66.01 MORBID OBESITY WITH BMI OF 40.0-44.9, ADULT (H): Chronic | ICD-10-CM

## 2020-04-15 DIAGNOSIS — R63.5 WEIGHT GAIN: ICD-10-CM

## 2020-04-15 PROBLEM — R53.83 FATIGUE, UNSPECIFIED TYPE: Status: RESOLVED | Noted: 2020-03-26 | Resolved: 2020-04-15

## 2020-04-15 PROBLEM — R45.89 DEPRESSED AFFECT: Status: RESOLVED | Noted: 2020-03-26 | Resolved: 2020-04-15

## 2020-04-15 PROCEDURE — 99214 OFFICE O/P EST MOD 30 MIN: CPT | Mod: 95 | Performed by: NURSE PRACTITIONER

## 2020-04-15 RX ORDER — PHENTERMINE HYDROCHLORIDE 37.5 MG/1
37.5 CAPSULE ORAL EVERY MORNING
Qty: 30 CAPSULE | Refills: 0 | Status: SHIPPED | OUTPATIENT
Start: 2020-04-15 | End: 2020-04-15

## 2020-04-15 RX ORDER — PHENTERMINE HYDROCHLORIDE 37.5 MG/1
37.5 CAPSULE ORAL EVERY MORNING
Qty: 30 CAPSULE | Refills: 0 | Status: SHIPPED | OUTPATIENT
Start: 2020-04-15 | End: 2020-05-04

## 2020-04-15 ASSESSMENT — ANXIETY QUESTIONNAIRES
7. FEELING AFRAID AS IF SOMETHING AWFUL MIGHT HAPPEN: NOT AT ALL
2. NOT BEING ABLE TO STOP OR CONTROL WORRYING: NOT AT ALL
5. BEING SO RESTLESS THAT IT IS HARD TO SIT STILL: NOT AT ALL
GAD7 TOTAL SCORE: 1
1. FEELING NERVOUS, ANXIOUS, OR ON EDGE: SEVERAL DAYS
6. BECOMING EASILY ANNOYED OR IRRITABLE: NOT AT ALL
3. WORRYING TOO MUCH ABOUT DIFFERENT THINGS: NOT AT ALL

## 2020-04-15 ASSESSMENT — PATIENT HEALTH QUESTIONNAIRE - PHQ9
SUM OF ALL RESPONSES TO PHQ QUESTIONS 1-9: 2
5. POOR APPETITE OR OVEREATING: NOT AT ALL

## 2020-04-15 NOTE — PATIENT INSTRUCTIONS
1. Mood changes  Chronic, improved  Continue Vitamin B12 and Vitamin D    2. Weight gain  Chronic, worsened  START- phentermine (ADIPEX-P) 37.5 MG capsule; Take 1 capsule (37.5 mg) by mouth every morning  Dispense: 30 capsule; Refill: 0  - COMPREHENSIVE WEIGHT MANAGEMENT referral- they will call you  - CMP- future lab    They recommend you call your insurance and let them know you have been referred to a comprehensive weight management program    (Looks like [place] location is not set up yet, but the [facility] is the main hub)    Check blood pressure in 2-3 weeks at [facility] (make sure they use manual blood pressure cuff- not automatic)    Check labs in 2-3 weeks at [facility]    Continue exercise- goal to get up to 60 minutes of moderate intensity daily    You could see how many calories you eat in a day (Yellow Chip- free leelee) as a baseline    Eat breakfast daily    Get in 5 minutes of mindful meditation    3. Morbid obesity with BMI of 40.0-44.9, adult (H)  Chronic, worsened  - phentermine (ADIPEX-P) 37.5 MG capsule; Take 1 capsule (37.5 mg) by mouth every morning  Dispense: 30 capsule; Refill: 0  - COMPREHENSIVE WEIGHT MANAGEMENT        HOW TO BE HEALTHIER    Tools to use: [employer] www.MODLOFT- FREE website that helps you with food choices, and exercise. You can talk with people, talk to trainers and dieticians.    Increase your water intake daily to 6 glasses     Purchase a pedometer that will monitor how many steps you take (10, 000 daily is a goal for everyone)- these are sometimes included in smart phones.    Use meal replacements such as Aliyah's meals, Lean Cuisines, Healthy Choice, Smart Ones, Weight Watchers Meals, Slim Fast and Glucerna shakes and supplement with fresh fruits and vegetables      Please drink a lot of water daily. Most people typically need about 2 liters of water daily and more if they are exercising, have a large weight, or have a fever or illness. Add Crystal Light  for flavoring if desired. But no pop with calories in it.      Keep a food journal of what you eat, calories in what you eat, and mood. You will see where you are getting the majority of your calories. Bring the journal with you to your nutrition appointment (if you are being referred).      Fitness apps: MyFitnessPal, LifeFitness, Fitbit, Sworkit Fitness(simple to-go fitness), High Intensity HIIT workouts,  NEOU (life and on-demand videos,       Food apps: Weight Watchers, SparkRecipes, LoseIt, Tap&Track, Fooducate, and HealthyOut (restaurant food tracking).      Focus on wet volumetrics, meaning, eat more foods that are high in water and fiber such as fruits and vegetables in order to get that full feeling. These are also good for your overall health as well.      Food delivery Let's [facility] to help you prepare meals for you and your family. Often times, the caloric and nutrition data and serving sizes are available for this food. This can be a time saving maneuver. The website can give you more information http://www.Social Market Analytics/. [employer] has and delivers Let's Dish & fresh low calorie salads      Food delivery Home  www.homeChill.comf.com      Food delivery Green  www.greenchef.com      Food delivery Hello Fresh at https://www.Dine in.Cognitive Health Innovations/food-boxes/classic-box/      Try Cooking Light recipes for low calorie meal preparation and planning      Diabetic carb counting: CalorieKing   http://www.calorieking.com/      Other food plan options you can search for on the internet and check out include: Paul LOMELI, [facility]    Here are 10 ways to get you moving more often:   1. Be less efficient. People typically try to think of ways to make daily tasks easier. But if we make them harder, we can get more exercise, says Brooke Gabriel MS, of [employer] Sal[employer], a , , and spokeswoman for the American [place] on Exercise (ACE).  Bring in the groceries from your car one  bag at a time so you have to make several trips,  Harvey says.  Put the laundry away a few items at a time, rather than carrying it up in a basket.    2. Shun labor-saving devices. Wash the car by hand rather than taking it to the car wash.  It takes about an hour and a half to do a good job, and in the meantime you ve gotten great exercise,  Harvey says. Use a push mower rather than a riding mower to groom your lawn.   3. Going somewhere? Take the long way. Walking up or down a few flights of stairs each day can be good for your heart. Avoid elevators and escalators whenever possible. If you ride the bus or subway to work, get off a stop before your office and walk the extra distance. When you go to the mall or the grocery store, park furthest from the entrance, not as close to it as you can, and you'll get a few extra minutes of walking -- one of the best exercises there is, Dr. Alejandra says.  Walking is great because anyone can do it and you don t need any special equipment other than a properly fitting pair of sneakers.    4. Be a morning person. Studies show that people who exercise in the morning are more likely to stick with it. As Harvey explains,  Are you going to feel like exercising at the end of a hard day? Probably not. If you do your workout in the morning, you re not only more likely to do it, but you'll also set a positive tone for the day.    5. Ink the deal. Whether morning, afternoon, or evening, pick the time that is most convenient for you to exercise and write it down in your daily planner. Keep your exercise routine as you would keep any appointment.   6. Watch your step. Investing in a good pedometer can help you stay motivated.  If you have a pedometer attached to your waist and you can see how many steps you ve taken, you ll see it doesn t take long to walk 5,000 steps and you will be inspired,  Harvey says. And building up to 10,000 steps a day won t seem like such a daunting a task.    7. Hire the right help. While weight training is important, if you don t know what you re doing, you run the risk of injuring yourself or not being effective, Harvey says. It s best to get instructions from a  at the gym. You also can buy a weight-training DVD and follow along in your living room.   8. Keep records. Grab a diary or logbook, and every day that you exercise, write down what you did and for how long. Your records will make it easy for you to see what you ve accomplished and make you more accountable. Blank pages? You d be ashamed.   9. Phone a friend. Find someone who likes the same activity that you do -- walking in the neighborhood, riding bikes, playing tennis -- and make a date to do it together.  Exercising with a friend or in a group can be very motivating,  Justyn says.  You are likely to walk longer or bike greater distances if you re talking to a friend along the way. The time will go by faster.  Don t have a jazmine who is available? Grab an MP3 player and listen to your favorite music or an audio book while exercising.   10. Do what you like. Whatever exercise you choose, be sure it s one that you enjoy. You re more likely to stick with it if it s something you have fun doing rather than something you see as a chore, Justyn says.   If you can t fit 40 minutes a day into your schedule, get more exercise simply by being less efficient with your chores and adding a little extra walking distance everywhere you go. However, if you pick an activity you like, finding time for fitness will become effortless and the rewards enormous.   BOOKS ON WEIGHT LOSS  A course in Weight Loss by Cori Gatica    Eat, Drink, and Weigh Less by [place] researcher Taco Ceballos, and Misty Gamboa    Ultrametabolism by Scot Pelayo. Recommend keeping Carbohydrates to  30-45g/meal and 15g/snack with fiber of at least 4g/serving, protein goal of 60-90g/day. Keep food diary on myfitnesspal.com.  Recommend pedometer with ultimate goal of 10,000 steps a day.     The Willpower Instinct by Betzy Singleton.    Finding Your Ideal Weight  Most of the people in magazines and on TV are far slimmer than average, yet this is the  ideal  that many people aim for. Before you decide that you won t be happy until you get down to a certain number of pounds, consider:    Your age. You probably wish you could get back to your college weight. But normal changes in metabolism and hormone levels that occur with aging make it unrealistic.    Your gender. In general, men have more muscle and heavier bones than women, which means that healthy men usually weigh more than healthy women of the same height.    Your current weight. If you are very heavy, focus on losing a smaller amount (such as 10 percent of your body weight). Losing just 5 to 10 pounds can improve your health.  Your Body Fat Percentage  A pound of muscle weighs the same as a pound of fat, but it takes up less space. Think of a trained athlete and a  couch potato.  Even though they may be the same height and weight, the athlete looks fitter, is healthier, and probably wears a smaller size of clothing. If you are muscular, a body fat test may be a more accurate measure of your ideal weight than the bathroom scale. Talk to your healthcare provider, who can help you set appropriate goals for yourself.    Weight Management: Healthy Eating  Food is your body s fuel. You can t live without it. The key is to give your body enough nutrients and energy without eating too much. Reading food labels can help you make healthy choices. Also, learn new eating habits to manage your weight.      All the values on the label are based on one serving. The serving size is the average portion. Remember to multiply the values on the label by the number of servings you eat.   Eat Less Fat  A gram of fat has almost twice the calories of a gram of protein or carbohydrates. Try to balance your  food choices so that 20% to 35% of your calories comes from total fat. This means an average of 2  to 3  grams of fat for each 100 calories you eat.  Eat More Fiber  High-fiber foods are digested more slowly than low-fiber foods, so you feel full longer. Try to get 31 grams of fiber each day. Foods high in fiber include:    Vegetables and fruits    Whole-grain or bran breads, pastas, and cereals    Legumes (beans) and peas  As you begin to eat more fiber, be sure to drink plenty of water to keep your digestive system working smoothly.  Tips    Don t skip meals. This often leads to overeating later on. It s best to spread your eating throughout the day.    Eat a variety of foods, not just a few favorites.    If you find yourself eating when you re not hungry, ask yourself why. Many of us eat when we re bored, stressed, or just to be polite. Listen to your body. If you re not hungry, get busy doing something else instead of eating.    Eat slower. It takes 20 minutes for your stomach to tell your brain that it s full.    Pay attention to what you eat. Don t read or watch TV during your meal.      Weight Management: Exercise and Activity  Goal: at least 40 minutes daily  Studies show that people who exercise are the most likely to lose weight and keep it off. Exercise burns calories. It helps build muscle to make your body stronger. Make exercise part of your weight-management plan. You must hit moderate- to high cardiovascular workout for weight loss. Get your heart rate up to 75% to help you burn fat. Check online for free calculators.  Make Activity Part of Your Day  You may not think you have the time to exercise. But you can work activity into your daily life. Take 10 minutes out of your lunch hour to take a walk. Walk to the newsstand to get your paper instead of having it delivered. Make it a habit to take the stairs instead of the elevator. Park in the farthest parking spot instead of the closest. You ll be  surprised at how fast these little changes can make a difference.  The Benefits of Exercise    Exercise increases your metabolism (the speed at which your body burns calories).    Regular exercise can increase the amount of muscle in your body. Muscle burns calories faster than fat. The more muscle you have, the more calories you burn.    Exercise gives you energy and curbs your appetite.    Exercise decreases stress and helps you sleep better.  Make Exercise Fun  Exercise can be fun. Choose an activity you enjoy. You may even get a friend to do it with you.    Take a resistance-training or aerobics class    Join a team sport    Take a dance class    Walk the dog    Ride a bike  If you have health problems, be sure to ask your doctor before you start an exercise program. Have a  help you develop a plan that s safe for you.     [id number] [facility], [address] [place] [address], [place] [address]. All rights reserved. This information is not intended as a substitute for professional medical care. Always follow your healthcare professional's instructions.    OTHER AVENUES TO INCREASE PHYSICAL FITNESS    Mapping Walks and Runs, biking   www.mapmyrun.com   www.[employer].com    Rating walkability of a neighborhood   www.Wonder Works Media    Treadmill Work Stations   www.Fuisz Media    Free Workouts at home   www.Keyword Rockstar.MangoPlate    Walking, hiking, biking trails   www.ralistotrails.com    Active volunteer opportunities   www.StreetFire.ClickHome    Race or walk/run events in your area   www.active.Eventdoo    Workout[employer]    www.AeroFS/PeepsOut Inc./   www.Trigemina.PRNMS INVESTMENTS    Adventure Vacations   www.Shopzilla    Heart rate Monitors   www.polarusa.com    Pedometers   www.Arctic Wolf Networks.PRNMS INVESTMENTS    Outdoor treasure hunts on your own   www.THE BEARDED LADY    7 Worst Junk Ingredients to Avoid   (If you don't know what it is, it probably isn't good to eat it!)  1. Sodium Nitrate (also called Sodium Nitrite)  This is a  preservative, coloring, and flavoring commonly found in processed meats like steve, ham, hot dogs, cold cuts and smoked fish. Studies have shown that it reacts with the body s digestive acids to form a cancer-causing agent called nitrosamines. So double-check that  healthy  turkey for carcinogens before you gobble down your sandwich!  2. Aspartame (aka NutraSweet/Equal)  In scientific terms, this is a chemical combination of two amino acids and methanol. It s better known by the brand names NutraSweet and Equal, which are sweeteners found in countless  diet  desserts, drink mixes, and soft drinks. Aspartame was once thought to be a safe artificial sweetener, but it is now believed to cause cancer and neurological problems such as dizziness and hallucinations.  3. Acesulfame-K  This artificial sweetener is 200 times sweeter than sugar and is often found in chewing gum and soft drinks. When tested in the laboratory, it caused cancer in rats. And that makes this additive a lot less sweet in our opinion!  4. Artificial Food Colorings  There are food colorings being used that are linked with cancer in animal testing as well as behavioral disorders in children. These include Yellow 5, Red 40, Blue 1, Blue 2, Green 3, Orange B, Red 3 and Yellow 6. Amazingly, these colors have been banned in the [place] yet remain in many American foods. They can easily be avoided by choosing natural foods that aren t chemically or colorfully enhanced.  5. MSG  Monosodium Glutamate (MSG) is an amino acid used as a flavor enhancer in many soups, salad dressings, chips, frozen entrees and restaurant food. This nasty additive can ian with the nervous system causing side effects like migraines and overeating in some individuals. MSG appears on labels under several aliases, including yeast extract and calcium caseinate. It s even been found on the labels of organic products! Here s a list of the common aliases for MSG.    Monosodium  Glutamate    Hydrolyzed Vegetable Protein    Hydrolyzed Protein    Hydrolyzed Plant Protein    Plant Protein Extract    Sodium Caseinate    Calcium Caseinate    Yeast Extract    Textured Protein    Autolyzed Yeast    Hydrolyzed Oat Flour    6. Trans Fats  Trans fats cause heart disease. It s a proven fact. Before purchasing any packaged food, check the ingredients list. Even if the label boasts  0g trans fats  BEWARE the product may still contain up to a 0.5g of trans fats per serving, if you see the words partially hydrogenated oils on the ingredients list. It s important to avoid even the smallest amount because it can raise your bad cholesterol and lower your good cholesterol, making you susceptible to all kinds of health problems!  7. Sodium Benzoate  Sodium benzoate is a preservative used in many foods and beverages. This ingredient is known to cause hives, asthma and other allergic reactions in sensitive individuals. New research shows that it may also cause behavioral disorders in children. One more reason to avoid this harmful ingredient: When used in beverages that also contain ascorbic acid (vitamin C) it forms benzene, a known carcinogen. Some drink manufacturers are still using this toxic duo, so you may have benzene lurking in your favorite drink!

## 2020-04-16 ASSESSMENT — ANXIETY QUESTIONNAIRES: GAD7 TOTAL SCORE: 1

## 2020-04-27 ENCOUNTER — VIRTUAL VISIT (OUTPATIENT)
Dept: ENDOCRINOLOGY | Facility: CLINIC | Age: 40
End: 2020-04-27
Attending: NURSE PRACTITIONER
Payer: COMMERCIAL

## 2020-04-27 ENCOUNTER — VIRTUAL VISIT (OUTPATIENT)
Dept: SURGERY | Facility: CLINIC | Age: 40
End: 2020-04-27
Payer: COMMERCIAL

## 2020-04-27 VITALS — BODY MASS INDEX: 44.23 KG/M2 | HEIGHT: 60 IN | WEIGHT: 225.3 LBS

## 2020-04-27 DIAGNOSIS — E66.01 MORBID OBESITY WITH BMI OF 40.0-44.9, ADULT (H): Primary | Chronic | ICD-10-CM

## 2020-04-27 DIAGNOSIS — E66.01 MORBID OBESITY WITH BMI OF 40.0-44.9, ADULT (H): Chronic | ICD-10-CM

## 2020-04-27 ASSESSMENT — MIFFLIN-ST. JEOR: SCORE: 1628.45

## 2020-04-27 ASSESSMENT — PAIN SCALES - GENERAL: PAINLEVEL: NO PAIN (0)

## 2020-04-27 NOTE — PROGRESS NOTES
New Medical Weight Management Consult    PATIENT:  Sebastian Schuster  MRN:         [id number]  :         1980  GIORGIO:         2020    Dear Karyna Stockton CNP,    I had the pleasure of seeing your patient, Sebastian Schuster.  Full intake/assessment done to determine barriers to weight loss success and develop a treatment plan.  Sebastian Schuster is a 39 year old female interested in treatment of medical problems associated with weight.  Her weight today is 225 lbs 4.8 oz, Body mass index is 44 kg/m ., and she has the following co-morbidities:    Referred by primary doctor due to her concerns about weight gain despite trying to eat right. Was normal weight as child and teen. Weight gain onset was with children 16 and 10 yrs ago but has gained more in last 6 months, at least 15 lbs. Has been eating less and working out in that time. Has tried Weight Watchers without much benefit. Lives in [facility] [place] [facility] and works at [employer]. Home with , 2 daughters. Mostly home cooking. Does not eat breakfast generally but now trying to eat yogurt and banana for breakfast. Lunch - trying to bring from home as otherwise fast food. Dinner is casual American, heavy starch. Trying not to snack after dinner, sometimes ice cream. Drinking mostly water, some diet pop (trying to limit 2/d). Struggles with craving sweets like candy.     No flowsheet data found.    No flowsheet data found.    No flowsheet data found.    Wt Readings from Last 4 Encounters:   20 102.2 kg (225 lb 4.8 oz)   19 99.3 kg (219 lb)   19 102.1 kg (225 lb)   18 99.3 kg (219 lb)       No flowsheet data found.    No flowsheet data found.    No flowsheet data found.    No flowsheet data found.    PAST MEDICAL HISTORY:  Past Medical History:   Diagnosis Date     Metrorrhagia 2013       No flowsheet data found.    No flowsheet data found.    No flowsheet data found.    MEDICATIONS:   Current Outpatient Medications   Medication Sig Dispense  Refill     Cyanocobalamin (VITAMIN B-12 PO)        ENSKYCE 0.15-30 MG-MCG tablet TAKE 1 TABLET BY MOUTH ONCE DAILY 84 tablet 1     phentermine (ADIPEX-P) 37.5 MG capsule Take 1 capsule (37.5 mg) by mouth every morning 30 capsule 0     VITAMIN D, CHOLECALCIFEROL, PO Take by mouth daily         ALLERGIES:   Allergies   Allergen Reactions     Augmentin Rash     Rash      Penicillins      See augmentin reaction      Lavender Oil Rash       PHYSICAL EXAM:  Ht 1.524 m (5')   Wt 102.2 kg (225 lb 4.8 oz)   BMI 44.00 kg/m     A & O x 3  HEENT: NCAT, mucous membranes moist  Respirations unlabored  Location of obesity: Peripheral Obesity    ASSESSMENT:  Sebastian is a patient with mature onset morbid obesity without significant element of familial/genetic influence and with current health consequences. She does need aggressive weight loss plan due to severe weight.    Sebastian Schuster eats a high carb diet, eats a high fat diet and eats to obtain specific degree of fullness.    Her problem is complicated by strong craving/reward pathways and unhelpful lifestyle choices. Her primary doctor has started her recently on phentermine and she thinks it might be helping.    Her ability to lose weight is impacted by physical impairment and lack of confidence.    PLAN:    Volumetrics eating plan  Meal planning    Craving/Reward   Ancillary testing:  N/A.  Food Plan:  Volumetrics and High protein/low carbohydrate.   Activity Plan:  Activity journal.  Supplementary:  N/A.   Medication:  The patient will begin medication in pursuit of improved medical status as influenced by body weight. She will start phentermine. Blood pressure and cardiac status are acceptable.  There is a mutual understanding of the goals and risks of this therapy. The patient is in agreement. She is educated on dosage regimen and possible side effects.    RTC:    12 weeks.  Phone call duration: 30 minutes.  I explained the conditions and plans (more than 50% of time was  "counseling/coordination of weight management).    Sincerely,    Gio Knox MD     The patient was evaluated via a billable telephone visit and notified:  \"This visit will be via telephone call between you and your physician. If lab work is needed we can place an order and you can later stop by the lab. Telephone visits are billed at different rates depending on your insurance coverage. During this emergency period, some insurers may be billed the same as an in-person visit.  Please check with your insurance provider with any questions. If the physician feels a telephone visit is not appropriate, you will not be charged for this service.\"    Patient has given verbal consent for Telephone visit?  Yes. How would you like to obtain your AVS? MyChart    "

## 2020-04-27 NOTE — PROGRESS NOTES
"Sebastian Schuster is a 39 year old female who is being evaluated via a billable video visit.      The patient has been notified of following:     \"This video visit will be conducted via a call between you and your physician/provider. We have found that certain health care needs can be provided without the need for an in-person physical exam.  This service lets us provide the care you need with a video conversation.  If a prescription is necessary we can send it directly to your pharmacy.  If lab work is needed we can place an order for that and you can then stop by our lab to have the test done at a later time.    Video visits are billed at different rates depending on your insurance coverage.  Please reach out to your insurance provider with any questions.    If during the course of the call the physician/provider feels a video visit is not appropriate, you will not be charged for this service.\"    Patient has given verbal consent for Video visit? Yes    How would you like to obtain your AVS? [place]    Patient would like the video invitation sent by: Text to cell phone: [phone]    Will anyone else be joining your video visit? No        Video-Visit Details    Type of service:  Video Visit    Video Start Time: 4:15 pm   Video End Time: 4:32 pm    Originating Location (pt. Location): Home    Distant Location (provider location):  [facility]     Platform used for Video Visit: 21GR[employer]        New Weight Management Nutrition Consultation    Sebastian Schuster is a 39 year old female presents today for new weight management nutrition consultation.  Patient referred by Dr. Knox on April 27, 2020.    Patient with Co-morbidities of obesity including:  Type II DM no  Renal Failure no  Sleep apnea no  Hypertension no   Dyslipidemia yes  Joint pain no  Back pain no  GERD no     Anthropometrics:  Estimated body mass index is 44 kg/m  as calculated from the following:    Height as of an earlier encounter on " "4/27/20: 1.524 m (5').    Weight as of an earlier encounter on 4/27/20: 102.2 kg (225 lb 4.8 oz).    Medications for Weight Loss:  Phentermine    NUTRITION HISTORY  See MD note for further details.  Cooks for  and daughters. She mostly does home cooking.     Recent food recall:  Breakfast: skips or yogurt and banana   Lunch: fast food or leftovers  Dinner: Heavy starches, meat and potatoes  Snacks: ice cream or sweets   Beverages: water an diet pop     Physical Activity:  Did not discuss today     MALNUTRITION  Video visit only able to assess from the Chest up  % Intake: No decreased intake noted  % Weight Loss: None noted  Subcutaneous Fat Loss: None observed-Visual assessment   Muscle Loss: None observed-Visual assessment   Fluid Accumulation/Edema: Unable to assess  Malnutrition Diagnosis: Patient does not meet two of the established criteria necessary for diagnosing malnutrition    Nutrition Prescription  Recommended energy/nutrient modification. (Per MD)    Nutrition Diagnosis  Obesity r/t long history of self-monitoring deficit and excessive energy intake aeb BMI >30.    Nutrition Intervention  Materials/education provided on Volumetric eating to help satiety level on fewer calories; portion control and healthy food choices, offered meal plan options patient states not needed at the time.   -AVS via [employer]      Patient Understanding: good  Expected Compliance: fair-good  Follow-Up Plans: 4-6 weeks      Nutrition Goals:  1) Focus on lean proteins and vegetables   2) Eat slowly (20-30 minutes per meal), chewing foods well (25 chews per bite/applesauce consistency)  3) 9\" Plate method (1/2 plate non-starchy vegetables/fruit, 1/4 plate lean protein, 1/4 plate whole grain starch - no more than 1/2 cup carb/meal)    Follow-Up:  4-6 weeks       Gilma Monroy, MS, RD, LD      "

## 2020-04-27 NOTE — NURSING NOTE
(   Chief Complaint   Patient presents with     Weight Problem     new MW    )    ( Weight: 102.2 kg (225 lb 4.8 oz)(this mornin per pt) )  ( Height: 152.4 cm (5') )  ( BMI (Calculated): 44 )  (   )  (   )  (   )  (   )  (   )  (   )    (   )  (   )  (   )  (   )  (   )  (   )  (   )    (   Patient Active Problem List   Diagnosis     CARDIOVASCULAR SCREENING; LDL GOAL LESS THAN 160- elevated trilgycerides 7/2019     Morbid obesity with BMI of 40.0-44.9, adult (H)     PMDD (premenstrual dysphoric disorder)     HX of External hemorrhoids     Mood changes    )  (   Current Outpatient Medications   Medication Sig Dispense Refill     Cyanocobalamin (VITAMIN B-12 PO)        ENSKYCE 0.15-30 MG-MCG tablet TAKE 1 TABLET BY MOUTH ONCE DAILY 84 tablet 1     phentermine (ADIPEX-P) 37.5 MG capsule Take 1 capsule (37.5 mg) by mouth every morning 30 capsule 0     VITAMIN D, CHOLECALCIFEROL, PO Take by mouth daily      )  ( Diabetes Eval:    )    ( Pain Eval:  No Pain (0) )    ( Wound Eval:       )    (   History   Smoking Status     Never Smoker   Smokeless Tobacco     Never Used    )    ( Signed By:  Daniel Sapp, EMT; April 27, 2020; 10:17 AM )

## 2020-04-27 NOTE — PROGRESS NOTES
"Sebastian Schuster is a 39 year old female who is being evaluated via a billable video visit.      The patient has been notified of following:     \"This video visit will be conducted via a call between you and your physician/provider. We have found that certain health care needs can be provided without the need for an in-person physical exam.  This service lets us provide the care you need with a video conversation.  If a prescription is necessary we can send it directly to your pharmacy.  If lab work is needed we can place an order for that and you can then stop by our lab to have the test done at a later time.    Video visits are billed at different rates depending on your insurance coverage.  Please reach out to your insurance provider with any questions.    If during the course of the call the physician/provider feels a video visit is not appropriate, you will not be charged for this service.\"    Patient has given verbal consent for Video visit? Yes    How would you like to obtain your AVS? [place]    Patient would like the video invitation sent by: Text to cell phone: [phone]    Will anyone else be joining your video visit? No  {If patient encounters technical issues they should call [phone] :[id number]}      Video-Visit Details    Type of service:  Video Visit    Video Start Time: {video visit start/end time:152948}  Video End Time: {video visit start/end time:152948}    Originating Location (pt. Location): {video visit patient location:973585::\"Home\"}    Distant Location (provider location):  [facility]     Mode of Communication:  Video Conference via [employer]      {signature options:180015}      "

## 2020-05-04 RX ORDER — PHENTERMINE HYDROCHLORIDE 37.5 MG/1
37.5 CAPSULE ORAL EVERY MORNING
Qty: 30 CAPSULE | Refills: 5 | Status: SHIPPED | OUTPATIENT
Start: 2020-05-04 | End: 2020-11-10

## 2020-07-10 DIAGNOSIS — N92.1 METRORRHAGIA: ICD-10-CM

## 2020-07-10 RX ORDER — DESOGESTREL AND ETHINYL ESTRADIOL 0.15-0.03
KIT ORAL
Qty: 84 TABLET | Refills: 0 | Status: SHIPPED | OUTPATIENT
Start: 2020-07-10 | End: 2020-10-01

## 2020-10-01 DIAGNOSIS — N92.1 METRORRHAGIA: ICD-10-CM

## 2020-10-01 RX ORDER — DESOGESTREL AND ETHINYL ESTRADIOL 0.15-0.03
KIT ORAL
Qty: 84 TABLET | Refills: 0 | Status: SHIPPED | OUTPATIENT
Start: 2020-10-01 | End: 2020-12-28

## 2020-11-10 ENCOUNTER — VIRTUAL VISIT (OUTPATIENT)
Dept: FAMILY MEDICINE | Facility: CLINIC | Age: 40
End: 2020-11-10
Payer: COMMERCIAL

## 2020-11-10 DIAGNOSIS — J20.9 ACUTE BRONCHITIS WITH SYMPTOMS > 10 DAYS: Primary | ICD-10-CM

## 2020-11-10 PROCEDURE — 99213 OFFICE O/P EST LOW 20 MIN: CPT | Mod: 95 | Performed by: NURSE PRACTITIONER

## 2020-11-10 RX ORDER — AZITHROMYCIN 250 MG/1
TABLET, FILM COATED ORAL
Qty: 6 TABLET | Refills: 0 | Status: SHIPPED | OUTPATIENT
Start: 2020-11-10 | End: 2020-11-15

## 2020-11-10 RX ORDER — BENZONATATE 100 MG/1
100-200 CAPSULE ORAL 3 TIMES DAILY PRN
Qty: 42 CAPSULE | Refills: 0 | Status: SHIPPED | OUTPATIENT
Start: 2020-11-10 | End: 2021-04-20

## 2020-11-10 RX ORDER — PREDNISONE 20 MG/1
40 TABLET ORAL DAILY
Qty: 10 TABLET | Refills: 0 | Status: SHIPPED | OUTPATIENT
Start: 2020-11-10 | End: 2020-11-15

## 2020-11-10 NOTE — PATIENT INSTRUCTIONS
1. Acute bronchitis with symptoms > 10 days  Acute, symptoms x 2 weeks. Tested negative for COVID. Will treat with antibiotics, prednisone and tessalon for cough. Advised if symptoms worsening or not improving would recommend follow up in clinic. Encourage rest, fluids, head of bed elevation and cool mist vaporizer.   - azithromycin (ZITHROMAX) 250 MG tablet; Take 2 tablets (500 mg) by mouth daily for 1 day, THEN 1 tablet (250 mg) daily for 4 days.  Dispense: 6 tablet; Refill: 0  - predniSONE (DELTASONE) 20 MG tablet; Take 2 tablets (40 mg) by mouth daily for 5 days  Dispense: 10 tablet; Refill: 0  - benzonatate (TESSALON) 100 MG capsule; Take 1-2 capsules (100-200 mg) by mouth 3 times daily as needed for cough  Dispense: 42 capsule; Refill: 0

## 2020-11-10 NOTE — PROGRESS NOTES
"Sebastian Schuster is a 39 year old female who is being evaluated via a billable video visit.      The patient has been notified of following:     \"This video visit will be conducted via a call between you and your physician/provider. We have found that certain health care needs can be provided without the need for an in-person physical exam.  This service lets us provide the care you need with a video conversation.  If a prescription is necessary we can send it directly to your pharmacy.  If lab work is needed we can place an order for that and you can then stop by our lab to have the test done at a later time.    Video visits are billed at different rates depending on your insurance coverage.  Please reach out to your insurance provider with any questions.    If during the course of the call the physician/provider feels a video visit is not appropriate, you will not be charged for this service.\"    Patient has given verbal consent for Video visit? Yes  How would you like to obtain your AVS? MyChart  If you are dropped from the video visit, the video invite should be resent to: Text to cell phone: [phone]  Will anyone else be joining your video visit? No      Subjective     Sebastian Schuster is a 39 year old female who presents today via video visit for the following health issues:    HPI     Acute Illness  Acute illness concerns: cough slight, shortness of breath, feels like just finished cardio workout and has some mild burning in chest and upper abdomen  Onset/Duration: 2 weeks  Symptoms:  Fever: no  Chills/Sweats: no  Headache (location?): YES off and on  Sinus Pressure: no  Conjunctivitis:  no  Ear Pain: no  Rhinorrhea: no  Congestion: no  Sore Throat: no  Cough: YES-non-productive  Wheeze: no  Decreased Appetite: no  Nausea: no  Vomiting: no  Diarrhea: no  Dysuria/Freq.: no  Dysuria or Hematuria: no  Fatigue/Achiness: no  Sick/Strep Exposure: YES- co-worker positive COVID, - was around on Thursday about 8 feet from " with mask  Patient test was negative yesterday  Therapies tried and outcome: medication for headache helped resolve     Maybe sneezing a little more than normal     Video Start Time: 3:00 PM      Review of Systems   Constitutional, HEENT, cardiovascular, pulmonary, gi and gu systems are negative, except as otherwise noted.      Objective           Vitals:  No vitals were obtained today due to virtual visit.    Physical Exam     GENERAL: Healthy, alert and no distress  EYES: Eyes grossly normal to inspection.  No discharge or erythema, or obvious scleral/conjunctival abnormalities.  RESP: No audible wheeze, cough, or visible cyanosis.  No visible retractions or increased work of breathing.    SKIN: Visible skin clear. No significant rash, abnormal pigmentation or lesions.  NEURO: Cranial nerves grossly intact.  Mentation and speech appropriate for age.  PSYCH: Mentation appears normal, affect normal/bright, judgement and insight intact, normal speech and appearance well-groomed.      Diagnostic Test Results:  none          Assessment & Plan     1. Acute bronchitis with symptoms > 10 days  Acute, symptoms x 2 weeks. Tested negative for COVID. Will treat with antibiotics, prednisone and tessalon for cough. Advised if symptoms worsening or not improving would recommend follow up in clinic. Encourage rest, fluids, head of bed elevation and cool mist vaporizer.   - azithromycin (ZITHROMAX) 250 MG tablet; Take 2 tablets (500 mg) by mouth daily for 1 day, THEN 1 tablet (250 mg) daily for 4 days.  Dispense: 6 tablet; Refill: 0  - predniSONE (DELTASONE) 20 MG tablet; Take 2 tablets (40 mg) by mouth daily for 5 days  Dispense: 10 tablet; Refill: 0  - benzonatate (TESSALON) 100 MG capsule; Take 1-2 capsules (100-200 mg) by mouth 3 times daily as needed for cough  Dispense: 42 capsule; Refill: 0      BMI:   Estimated body mass index is 44 kg/m  as calculated from the following:    Height as of 4/27/20: 1.524 m (5').    Weight as  of 4/27/20: 102.2 kg (225 lb 4.8 oz).          See Patient Instructions    Return in about 1 week (around 11/17/2020), or if symptoms worsen or fail to improve.    JOSE MANUEL Scott CNP  [facility]      Video-Visit Details    Type of service:  Video Visit    Video End Time:3:10 PM    Originating Location (pt. Location): Home    Distant Location (provider location):  [facility]     Platform used for Video Visit: Rosaura

## 2020-11-16 ENCOUNTER — MYC MEDICAL ADVICE (OUTPATIENT)
Dept: FAMILY MEDICINE | Facility: CLINIC | Age: 40
End: 2020-11-16

## 2020-11-17 NOTE — TELEPHONE ENCOUNTER
I think patient should be seen in clinic for evaluation, possible chest x-ray.     Thanks,  JOSE MANUEL Corral CNP

## 2020-11-17 NOTE — TELEPHONE ENCOUNTER
Pt informed, scheduled with Purn[facility] tomorrow AM- [facility].  Aware if CXR needed will need to go to [facility] for this.  LISE David RN

## 2020-11-17 NOTE — PROGRESS NOTES
SUBJECTIVE   Sebastian Schuster is a  female who presents to clinic today for the following health issue(s):       ENT Symptoms             Symptoms: cc Present Absent Comment   Fever/Chills   x    Fatigue   x    Muscle Aches   x    Eye Irritation   x    Sneezing   x    Nasal Ervin/Drg   x    Sinus Pressure/Pain   x    Loss of smell   x    Dental pain   x    Sore Throat   x    Swollen Glands   x    Ear Pain/Fullness   x    Cough  x  Non-productive, shortness of breath makes you cough.    Wheeze   x    Chest Pain   x Chest pressure   Shortness of breath  x  Not much improved - only with cough    Rash   x    Other   x Denies GI sx   Negative COVID test on 11/9/2020  Symptom duration:  3 weeks   Symptom severity:  mild-moderate   Treatments tried:  tessalon is helpful, zpack did not help   Contacts:  coworker positive COVID, exposure 11/5/2020     Had negative COVID through [employer] in [place]     PCP   Karyna Stockton, [phone]    Health Maintenance        Health Maintenance Due   Topic Date Due     HEPATITIS C SCREENING  11/23/2000     DTAP/TDAP/TD IMMUNIZATION (2 - Td) 09/03/2018     PREVENTIVE CARE VISIT  07/23/2020     INFLUENZA VACCINE (1) 09/01/2020       HPI        Patient Active Problem List   Diagnosis     CARDIOVASCULAR SCREENING; LDL GOAL LESS THAN 160- elevated trilgycerides 7/2019     Morbid obesity with BMI of 40.0-44.9, adult (H)     PMDD (premenstrual dysphoric disorder)     HX of External hemorrhoids     Mood changes     Current Outpatient Medications   Medication     benzonatate (TESSALON) 100 MG capsule     Cyanocobalamin (VITAMIN B-12 PO)     ENSKYCE 0.15-30 MG-MCG tablet     guaiFENesin-codeine (ROBITUSSIN AC) 100-10 MG/5ML solution     VITAMIN D, CHOLECALCIFEROL, PO     No current facility-administered medications for this visit.        Reviewed and updated as needed this visit by Provider:  Tobacco  Allergies  Meds  Med Hx  Surg Hx  Fam Hx  Soc Hx     ROS:  Constitutional,  "HEENT, cardiovascular, pulmonary, gi and gu systems are negative, except as otherwise noted.    PHYSICAL EXAM   /76   Pulse 84   Temp 98.1  F (36.7  C)   Resp 18   Ht 1.543 m (5' 0.75\")   Wt 101.4 kg (223 lb 9.6 oz)   LMP 10/29/2020 (Approximate)   SpO2 99%   Breastfeeding No   BMI 42.60 kg/m    Body mass index is 42.6 kg/m .  GENERAL APPEARANCE: healthy, alert and no distress  NECK: no adenopathy, no asymmetry, masses, or scars and thyroid normal to palpation  RESP: lungs clear to auscultation - no rales, rhonchi or wheezes  CV: regular rates and rhythm, normal S1 S2, no S3 or S4 and no murmur, click or rub  ABDOMEN: soft, nontender, without hepatosplenomegaly or masses and bowel sounds normal  MS: extremities normal- no gross deformities noted  SKIN: no suspicious lesions or rashes  NEURO: Normal strength and tone, mentation intact and speech normal  PSYCH: mentation appears normal and affect normal/bright      Diagnostic Test Results:  CXR - pending     ASSESSMENT & PLAN   Assessment & Plan     1. Cough  Cough x 3 weeks, COVID test negative. Mild shortness of breath triggers cough. Cough is intermittent. No fevers, chills or other symptoms. Tessalon helpful for cough. Will proceed with chest x-ray today - down at [facility]. Will call with results and further recommendations. Also prescribed Robitussin with Codeine for night cough.   - XR Chest 2 Views; Future  - guaiFENesin-codeine (ROBITUSSIN AC) 100-10 MG/5ML solution; Take 5-10 mLs by mouth every 4 hours as needed for cough  Dispense: 180 mL; Refill: 0    2. Lab test negative for COVID-19 virus    - XR Chest 2 Views; Future     BMI:   Estimated body mass index is 42.6 kg/m  as calculated from the following:    Height as of this encounter: 1.543 m (5' 0.75\").    Weight as of this encounter: 101.4 kg (223 lb 9.6 oz).         Risks, benefits, side effects and rationale for treatment plan fully discussed with the patient and understanding " expressed.    See Patient Instructions    Return in about 1 week (around 11/25/2020), or if symptoms worsen or fail to improve.    JOSE MANUEL Scott [facility]    Risks, benefits, side effects and rationale for treatment plan fully discussed with the patient and understanding expressed.

## 2020-11-18 ENCOUNTER — ANCILLARY PROCEDURE (OUTPATIENT)
Dept: GENERAL RADIOLOGY | Facility: CLINIC | Age: 40
End: 2020-11-18
Attending: NURSE PRACTITIONER
Payer: COMMERCIAL

## 2020-11-18 ENCOUNTER — OFFICE VISIT (OUTPATIENT)
Dept: FAMILY MEDICINE | Facility: CLINIC | Age: 40
End: 2020-11-18
Payer: COMMERCIAL

## 2020-11-18 VITALS
WEIGHT: 223.6 LBS | OXYGEN SATURATION: 99 % | HEART RATE: 84 BPM | BODY MASS INDEX: 42.21 KG/M2 | TEMPERATURE: 98.1 F | HEIGHT: 61 IN | SYSTOLIC BLOOD PRESSURE: 120 MMHG | DIASTOLIC BLOOD PRESSURE: 76 MMHG | RESPIRATION RATE: 18 BRPM

## 2020-11-18 DIAGNOSIS — R05.9 COUGH: ICD-10-CM

## 2020-11-18 DIAGNOSIS — Z20.822 LAB TEST NEGATIVE FOR COVID-19 VIRUS: ICD-10-CM

## 2020-11-18 DIAGNOSIS — R05.9 COUGH: Primary | ICD-10-CM

## 2020-11-18 PROCEDURE — 99213 OFFICE O/P EST LOW 20 MIN: CPT | Performed by: NURSE PRACTITIONER

## 2020-11-18 PROCEDURE — 71046 X-RAY EXAM CHEST 2 VIEWS: CPT | Performed by: RADIOLOGY

## 2020-11-18 RX ORDER — CODEINE PHOSPHATE AND GUAIFENESIN 10; 100 MG/5ML; MG/5ML
1-2 SOLUTION ORAL EVERY 4 HOURS PRN
Qty: 180 ML | Refills: 0 | Status: SHIPPED | OUTPATIENT
Start: 2020-11-18 | End: 2021-04-20

## 2020-11-18 ASSESSMENT — ANXIETY QUESTIONNAIRES
6. BECOMING EASILY ANNOYED OR IRRITABLE: NOT AT ALL
GAD7 TOTAL SCORE: 2
2. NOT BEING ABLE TO STOP OR CONTROL WORRYING: SEVERAL DAYS
1. FEELING NERVOUS, ANXIOUS, OR ON EDGE: NOT AT ALL
5. BEING SO RESTLESS THAT IT IS HARD TO SIT STILL: NOT AT ALL
3. WORRYING TOO MUCH ABOUT DIFFERENT THINGS: SEVERAL DAYS
7. FEELING AFRAID AS IF SOMETHING AWFUL MIGHT HAPPEN: NOT AT ALL
GAD7 TOTAL SCORE: 2
7. FEELING AFRAID AS IF SOMETHING AWFUL MIGHT HAPPEN: NOT AT ALL
GAD7 TOTAL SCORE: 2
4. TROUBLE RELAXING: NOT AT ALL

## 2020-11-18 ASSESSMENT — PATIENT HEALTH QUESTIONNAIRE - PHQ9
10. IF YOU CHECKED OFF ANY PROBLEMS, HOW DIFFICULT HAVE THESE PROBLEMS MADE IT FOR YOU TO DO YOUR WORK, TAKE CARE OF THINGS AT HOME, OR GET ALONG WITH OTHER PEOPLE: NOT DIFFICULT AT ALL
SUM OF ALL RESPONSES TO PHQ QUESTIONS 1-9: 4
SUM OF ALL RESPONSES TO PHQ QUESTIONS 1-9: 4

## 2020-11-18 ASSESSMENT — MIFFLIN-ST. JEOR: SCORE: 1632.65

## 2020-11-18 NOTE — PATIENT INSTRUCTIONS
1. Cough  Cough x 3 weeks, COVID test negative. Mild shortness of breath triggers cough. Cough is intermittent. No fevers, chills or other symptoms. Tessalon helpful for cough. Will proceed with chest x-ray today - down at [facility]. Will call with results and further recommendations. Also prescribed Robitussin with Codeine for night cough.   - XR Chest 2 Views; Future  - guaiFENesin-codeine (ROBITUSSIN AC) 100-10 MG/5ML solution; Take 5-10 mLs by mouth every 4 hours as needed for cough  Dispense: 180 mL; Refill: 0    2. Lab test negative for COVID-19 virus    - XR Chest 2 Views; Future

## 2020-11-19 ASSESSMENT — ANXIETY QUESTIONNAIRES: GAD7 TOTAL SCORE: 2

## 2020-11-19 ASSESSMENT — PATIENT HEALTH QUESTIONNAIRE - PHQ9: SUM OF ALL RESPONSES TO PHQ QUESTIONS 1-9: 4

## 2020-11-22 ENCOUNTER — HEALTH MAINTENANCE LETTER (OUTPATIENT)
Age: 40
End: 2020-11-22

## 2020-12-28 DIAGNOSIS — N92.1 METRORRHAGIA: ICD-10-CM

## 2020-12-29 RX ORDER — DESOGESTREL AND ETHINYL ESTRADIOL 0.15-0.03
1 KIT ORAL DAILY
Qty: 84 TABLET | Refills: 0 | Status: SHIPPED | OUTPATIENT
Start: 2020-12-29 | End: 2021-03-25

## 2021-03-25 DIAGNOSIS — N92.1 METRORRHAGIA: ICD-10-CM

## 2021-03-25 RX ORDER — DESOGESTREL AND ETHINYL ESTRADIOL 0.15-0.03
KIT ORAL
Qty: 84 TABLET | Refills: 0 | Status: SHIPPED | OUTPATIENT
Start: 2021-03-25 | End: 2021-06-11

## 2021-04-20 ENCOUNTER — VIRTUAL VISIT (OUTPATIENT)
Dept: FAMILY MEDICINE | Facility: CLINIC | Age: 41
End: 2021-04-20
Payer: COMMERCIAL

## 2021-04-20 DIAGNOSIS — J00 ACUTE RHINITIS: Primary | ICD-10-CM

## 2021-04-20 PROCEDURE — 99213 OFFICE O/P EST LOW 20 MIN: CPT | Mod: 95 | Performed by: NURSE PRACTITIONER

## 2021-04-20 RX ORDER — FLUTICASONE PROPIONATE 50 MCG
2 SPRAY, SUSPENSION (ML) NASAL DAILY
Qty: 16 G | Refills: 1 | Status: SHIPPED | OUTPATIENT
Start: 2021-04-20 | End: 2021-06-09

## 2021-04-20 NOTE — PROGRESS NOTES
"Sebastian is a 40 year old who is being evaluated via a billable video visit.      How would you like to obtain your AVS? MyChart  If the video visit is dropped, the invitation should be resent by: Text to cell phone: [phone]  Will anyone else be joining your video visit? No      Video Start Time: 10:45 AM    Assessment & Plan     Acute rhinitis  Acute, symptoms x7 days.  Likely viral versus allergic.  Recommend starting Flonase nasal spray, 2 sprays each nostril daily.  Reviewed administration instructions with patient.  Also advised starting an over-the-counter antihistamine such as Claritin or Zyrtec daily.  Start nasal saline flushes daily, elevating head of bed, plenty of fluids and ibuprofen as needed.  Advised patient to check in with provider on Monday if symptoms are not improved and consider antibiotic.  - fluticasone (FLONASE) 50 MCG/ACT nasal spray; Spray 2 sprays into both nostrils daily             BMI:   Estimated body mass index is 42.6 kg/m  as calculated from the following:    Height as of 11/18/20: 1.543 m (5' 0.75\").    Weight as of 11/18/20: 101.4 kg (223 lb 9.6 oz).     See Patient Instructions    Return in about 1 week (around 4/27/2021), or if symptoms worsen or fail to improve.    JOSE MANUEL Scott CNP  [facility]    Subjective   Sebastian is a 40 year old who presents for the following health issues:    HPI     Acute Illness  Acute illness concerns: Sinus Infection  Onset/Duration: 1 week  Symptoms:  Fever: no  Chills/Sweats: no  Headache (location?): YES- because of all the pressure  Sinus Pressure: YES  Conjunctivitis:  No-but eyes hurt and feel swollen due to pressure  Ear Pain: YES- both, also both are pluggedd  Rhinorrhea: YES- somewhat  Congestion: YES  Sore Throat: minimal  Cough: minimal  Wheeze: no  Decreased Appetite: no  Nausea: no  Vomiting: no  Diarrhea: no  Dysuria/Freq.: no  Dysuria or Hematuria: no  Fatigue/Achiness: no  Sick/Strep Exposure: " no  Therapies tried and outcome: Tylenol Severe cold/sinus    No real seasonal allergy history   Started with major heartburn and then started with congestion   Minimal post nasal drainage     Review of Systems   Constitutional, HEENT, cardiovascular, pulmonary, gi and gu systems are negative, except as otherwise noted.      Objective           Vitals:  No vitals were obtained today due to virtual visit.    Physical Exam   GENERAL: Healthy, alert and no distress  EYES: Eyes grossly normal to inspection.  No discharge or erythema, or obvious scleral/conjunctival abnormalities.  RESP: No audible wheeze, cough, or visible cyanosis.  No visible retractions or increased work of breathing.    SKIN: Visible skin clear. No significant rash, abnormal pigmentation or lesions.  NEURO: Cranial nerves grossly intact.  Mentation and speech appropriate for age.  PSYCH: Mentation appears normal, affect normal/bright, judgement and insight intact, normal speech and appearance well-groomed.    Diagnostic Test Results:  none            Video-Visit Details    Type of service:  Video Visit    Video End Time:10:58 AM    Originating Location (pt. Location): Home    Distant Location (provider location):  [facility]     Platform used for Video Visit: [employer]

## 2021-04-20 NOTE — PATIENT INSTRUCTIONS
Acute rhinitis  Acute, symptoms x7 days.  Likely viral versus allergic.  Recommend starting Flonase nasal spray, 2 sprays each nostril daily.  Reviewed administration instructions with patient.  Also advised starting an over-the-counter antihistamine such as Claritin or Zyrtec daily.  Start nasal saline flushes daily, elevating head of bed, plenty of fluids and ibuprofen as needed.  Advised patient to check in with provider on Monday if symptoms are not improved and consider antibiotic.  - fluticasone (FLONASE) 50 MCG/ACT nasal spray; Spray 2 sprays into both nostrils daily

## 2021-04-23 ENCOUNTER — VIRTUAL VISIT (OUTPATIENT)
Dept: FAMILY MEDICINE | Facility: CLINIC | Age: 41
End: 2021-04-23
Payer: COMMERCIAL

## 2021-04-23 DIAGNOSIS — L01.00 IMPETIGO: Primary | ICD-10-CM

## 2021-04-23 PROCEDURE — 99213 OFFICE O/P EST LOW 20 MIN: CPT | Mod: 95 | Performed by: NURSE PRACTITIONER

## 2021-04-23 RX ORDER — CEPHALEXIN 500 MG/1
500 CAPSULE ORAL 4 TIMES DAILY
Qty: 28 CAPSULE | Refills: 0 | Status: SHIPPED | OUTPATIENT
Start: 2021-04-23 | End: 2021-04-30

## 2021-04-23 RX ORDER — MUPIROCIN 20 MG/G
OINTMENT TOPICAL 3 TIMES DAILY
Qty: 30 G | Refills: 0 | Status: SHIPPED | OUTPATIENT
Start: 2021-04-23 | End: 2021-04-28

## 2021-04-23 NOTE — PROGRESS NOTES
"Sebastian is a 40 year old who is being evaluated via a billable video visit.      How would you like to obtain your AVS? MyChart  If the video visit is dropped, the invitation should be resent by: Text to cell phone: [phone]  Will anyone else be joining your video visit? No      Video Start Time: 7:12 AM    Assessment & Plan     Impetigo  Acute, impetigo bacterial infection.  Start Bactroban topical ointment 3 times daily for 5 days and oral antibiotic for 7 days.  Instructed patient on keeping area clean and dry, washing hands.  If symptoms not improved or worsening notify provider.  - mupirocin (BACTROBAN) 2 % external ointment; Apply topically 3 times daily for 5 days  - cephALEXin (KEFLEX) 500 MG capsule; Take 1 capsule (500 mg) by mouth 4 times daily for 7 days             BMI:   Estimated body mass index is 42.6 kg/m  as calculated from the following:    Height as of 11/18/20: 1.543 m (5' 0.75\").    Weight as of 11/18/20: 101.4 kg (223 lb 9.6 oz).       See Patient Instructions    Return in about 1 week (around 4/30/2021), or if symptoms worsen or fail to improve.    JOSE MANUEL Scott CNP  M [facility]    Subjective   Sebastian is a 40 year old who presents for the following health issues     HPI     Rash  Onset/Duration: 2 days  Description  Location: tip of nostril, base of nose, and red neck and chest  Character: red, little bumps that have clear fluid  Itching: mild  Intensity:  mild  Progression of Symptoms:  improving  Accompanying signs and symptoms:   Fever: no  Body aches or joint pain: no  Sore throat symptoms: no  Recent cold symptoms: YES  History:           Previous episodes of similar rash: None  New exposures: medication zyrtec and flonase took on 4/20 and 4/21 yesterday took Claritin instead because thought maybe the zyrtec or flonase was causing rash  Recent travel: no  Exposure to similar rash: no  Precipitating or alleviating factors: sinus congestion for 10 " days  Therapies tried and outcome: benadryl cream caused it to burn      Review of Systems   Constitutional, HEENT, cardiovascular, pulmonary, gi and gu systems are negative, except as otherwise noted.      Objective           Vitals:  No vitals were obtained today due to virtual visit.    Physical Exam   GENERAL: Healthy, alert and no distress  EYES: Eyes grossly normal to inspection.  No discharge or erythema, or obvious scleral/conjunctival abnormalities.  RESP: No audible wheeze, cough, or visible cyanosis.  No visible retractions or increased work of breathing.    SKIN: Erythematous rash with honey colored crusting around lips and nose. No associated cellulitis   NEURO: Cranial nerves grossly intact.  Mentation and speech appropriate for age.  PSYCH: Mentation appears normal, affect normal/bright, judgement and insight intact, normal speech and appearance well-groomed.    Diagnostic Test Results:  none            Video-Visit Details    Type of service:  Video Visit    Video End Time:7:19 AM    Originating Location (pt. Location): Home    Distant Location (provider location):  [facility]     Platform used for Video Visit: [employer]

## 2021-04-23 NOTE — PATIENT INSTRUCTIONS
Impetigo  Acute, impetigo bacterial infection.  Start Bactroban topical ointment 3 times daily for 5 days and oral antibiotic for 7 days.  Instructed patient on keeping area clean and dry, washing hands.  If symptoms not improved or worsening notify provider.  - mupirocin (BACTROBAN) 2 % external ointment; Apply topically 3 times daily for 5 days  - cephALEXin (KEFLEX) 500 MG capsule; Take 1 capsule (500 mg) by mouth 4 times daily for 7 days

## 2021-04-24 ENCOUNTER — MYC MEDICAL ADVICE (OUTPATIENT)
Dept: FAMILY MEDICINE | Facility: CLINIC | Age: 41
End: 2021-04-24

## 2021-04-27 ENCOUNTER — MYC MEDICAL ADVICE (OUTPATIENT)
Dept: FAMILY MEDICINE | Facility: CLINIC | Age: 41
End: 2021-04-27

## 2021-05-06 ENCOUNTER — OFFICE VISIT (OUTPATIENT)
Dept: FAMILY MEDICINE | Facility: CLINIC | Age: 41
End: 2021-05-06
Payer: COMMERCIAL

## 2021-05-06 VITALS
HEART RATE: 84 BPM | HEIGHT: 61 IN | RESPIRATION RATE: 18 BRPM | BODY MASS INDEX: 43.43 KG/M2 | TEMPERATURE: 99 F | DIASTOLIC BLOOD PRESSURE: 70 MMHG | SYSTOLIC BLOOD PRESSURE: 138 MMHG | WEIGHT: 230 LBS

## 2021-05-06 DIAGNOSIS — L30.9 DERMATITIS: Primary | ICD-10-CM

## 2021-05-06 PROCEDURE — 99213 OFFICE O/P EST LOW 20 MIN: CPT | Performed by: FAMILY MEDICINE

## 2021-05-06 RX ORDER — BETAMETHASONE DIPROPIONATE 0.5 MG/G
CREAM TOPICAL 2 TIMES DAILY
Qty: 15 G | Refills: 0 | Status: SHIPPED | OUTPATIENT
Start: 2021-05-06 | End: 2021-06-09

## 2021-05-06 ASSESSMENT — MIFFLIN-ST. JEOR: SCORE: 1656.68

## 2021-05-06 NOTE — PROGRESS NOTES
"ASSESSMENT:   (L30.9) Dermatitis  (primary encounter diagnosis)  Comment: This does not look like impetigo which you had recently on your face. Looks like skin irritation.  Unknown cause.   Plan: betamethasone dipropionate (DIPROSONE) 0.05 %         external cream        Stop the mupirocin ointment.   Try betamethasone diproprionate .05% cream twice daily instead.  A thin layer will do.  It should be improving over the next few days and cleared up in a week.   If you have spreading, not improving in 7-10 days, other skin spots come up. Let us know.        Subjective   Sebastian is a 40 year old who presents for the following health issues  Chief Complaint   Patient presents with     Derm Problem      Itchy.  Worse in the AM.   Skin discoloration.     Rash  Onset/Duration: four days  Description  Location: left hand  Character: discolored  Itching: moderate  Intensity:  moderate  Progression of Symptoms:  same  Accompanying signs and symptoms:   Fever: no  Body aches or joint pain: no  Sore throat symptoms: no  Recent cold symptoms: no  History:           Previous episodes of similar rash: None  New exposures:  None  Recent travel: no  Exposure to similar rash: no  Precipitating or alleviating factors: none  Therapies tried and outcome: benadryl cream, calamine    Patient Active Problem List   Diagnosis     CARDIOVASCULAR SCREENING; LDL GOAL LESS THAN 160- elevated trilgycerides 7/2019     Morbid obesity with BMI of 40.0-44.9, adult (H)     PMDD (premenstrual dysphoric disorder)     HX of External hemorrhoids     Mood changes      OBJECTIVE:Blood pressure 138/70, pulse 84, temperature 99  F (37.2  C), temperature source Tympanic, resp. rate 18, height 1.543 m (5' 0.75\"), weight 104.3 kg (230 lb), not currently breastfeeding. BMI=Body mass index is 43.82 kg/m .  GENERAL APPEARANCE ADULT: Alert, no acute distress, obese  SKIN: She has a red patch < 1cm in palmar web space between fingers left hand.  A little indurated.  No " other skin lesion noted.  No break in the skin.

## 2021-05-06 NOTE — PATIENT INSTRUCTIONS
ASSESSMENT:   (L30.9) Dermatitis  (primary encounter diagnosis)  Comment: This does not look like impetigo which you had recently on your face. Looks like skin irritation.  Unknown cause.   Plan: betamethasone dipropionate (DIPROSONE) 0.05 %         external cream        Stop the mupirocin ointment.   Try betamethasone diproprionate .05% cream twice daily instead.  A thin layer will do.  It should be improving over the next few days and cleared up in a week.   If you have spreading, not improving in 7-10 days, other skin spots come up. Let us know.

## 2021-06-09 ENCOUNTER — OFFICE VISIT (OUTPATIENT)
Dept: FAMILY MEDICINE | Facility: CLINIC | Age: 41
End: 2021-06-09
Payer: COMMERCIAL

## 2021-06-09 VITALS
HEART RATE: 68 BPM | DIASTOLIC BLOOD PRESSURE: 64 MMHG | HEIGHT: 61 IN | WEIGHT: 229 LBS | SYSTOLIC BLOOD PRESSURE: 110 MMHG | TEMPERATURE: 98.5 F | RESPIRATION RATE: 16 BRPM | BODY MASS INDEX: 43.23 KG/M2

## 2021-06-09 DIAGNOSIS — L30.9 DERMATITIS: Primary | ICD-10-CM

## 2021-06-09 DIAGNOSIS — R45.86 MOOD CHANGES: ICD-10-CM

## 2021-06-09 PROCEDURE — 99213 OFFICE O/P EST LOW 20 MIN: CPT | Performed by: NURSE PRACTITIONER

## 2021-06-09 RX ORDER — PREDNISONE 20 MG/1
20 TABLET ORAL 2 TIMES DAILY
Qty: 10 TABLET | Refills: 0 | Status: SHIPPED | OUTPATIENT
Start: 2021-06-09 | End: 2021-06-14

## 2021-06-09 RX ORDER — HYDROXYZINE HYDROCHLORIDE 25 MG/1
12.5-25 TABLET, FILM COATED ORAL 3 TIMES DAILY PRN
Qty: 20 TABLET | Refills: 0 | Status: SHIPPED | OUTPATIENT
Start: 2021-06-09 | End: 2022-02-17

## 2021-06-09 ASSESSMENT — MIFFLIN-ST. JEOR: SCORE: 1652.15

## 2021-06-09 NOTE — PROGRESS NOTES
"    Assessment & Plan     Dermatitis  No acute distress.  With significant symptoms will start prednisone and hydroxyzine as needed.  Start Claritin or Zyrtec.  Symptomatic care and follow up discussed.  - predniSONE (DELTASONE) 20 MG tablet; Take 1 tablet (20 mg) by mouth 2 times daily for 5 days  - hydrOXYzine (ATARAX) 25 MG tablet; Take 0.5-1 tablets (12.5-25 mg) by mouth 3 times daily as needed for itching    Mood changes  Reports moods are good, no concerns.  Discussion on potential side effects of the prednisone.       Return in about 1 week (around 6/16/2021), or if symptoms worsen or fail to improve.    JOSE MANUEL Pedroza CNP  M [facility]    Chino Cortes is a 40 year old who presents for the following health issues     HPI     Rash  Onset/Duration: 1 week   Description  Location: right side stomach down groin and right leg   Character: blotchy, raised  Itching: moderate  Intensity:  moderate  Progression of Symptoms:  same  Accompanying signs and symptoms:   Fever: no  Body aches or joint pain: no - swelling in feet   Sore throat symptoms: no  Recent cold symptoms: no  History:           Previous episodes of similar rash: None  New exposures:  None  Recent travel: no  Exposure to similar rash: no  Precipitating or alleviating factors: none   Therapies tried and outcome: hydrocortisone cream -  not effective, Benadryl/diphenhydramine -  usually effective helped with sleep       Review of Systems   Constitutional, HEENT, cardiovascular, pulmonary, gi and gu systems are negative, except as otherwise noted.      Objective    /64 (Cuff Size: Adult Large)   Pulse 68   Temp 98.5  F (36.9  C) (Tympanic)   Resp 16   Ht 1.543 m (5' 0.75\")   Wt 103.9 kg (229 lb)   BMI 43.63 kg/m    Body mass index is 43.63 kg/m .  Physical Exam   GENERAL: healthy, alert and no distress  SKIN: erythematous patch present right lateral truck, excoriated papules present bilateral LE  PSYCH: " mentation appears normal, affect normal/bright

## 2021-06-09 NOTE — PATIENT INSTRUCTIONS
Prednisone twice daily for 5 days    Hydroxyzine as needed for itching     Start Claritin or Zyrtec     Follow up if symptoms do not improve or worsen.

## 2021-06-11 DIAGNOSIS — N92.1 METRORRHAGIA: ICD-10-CM

## 2021-06-11 RX ORDER — DESOGESTREL AND ETHINYL ESTRADIOL 0.15-0.03
KIT ORAL
Qty: 84 TABLET | Refills: 0 | Status: SHIPPED | OUTPATIENT
Start: 2021-06-11 | End: 2021-08-18

## 2021-08-16 DIAGNOSIS — N92.1 METRORRHAGIA: ICD-10-CM

## 2021-08-18 RX ORDER — DESOGESTREL AND ETHINYL ESTRADIOL 0.15-0.03
KIT ORAL
Qty: 84 TABLET | Refills: 0 | Status: SHIPPED | OUTPATIENT
Start: 2021-08-18 | End: 2021-11-10

## 2021-09-19 ENCOUNTER — HEALTH MAINTENANCE LETTER (OUTPATIENT)
Age: 41
End: 2021-09-19

## 2021-11-08 DIAGNOSIS — N92.1 METRORRHAGIA: ICD-10-CM

## 2021-11-10 RX ORDER — DESOGESTREL AND ETHINYL ESTRADIOL 0.15-0.03
KIT ORAL
Qty: 84 TABLET | Refills: 1 | Status: SHIPPED | OUTPATIENT
Start: 2021-11-10 | End: 2022-05-13

## 2021-11-17 ENCOUNTER — OFFICE VISIT (OUTPATIENT)
Dept: FAMILY MEDICINE | Facility: CLINIC | Age: 41
End: 2021-11-17
Payer: COMMERCIAL

## 2021-11-17 VITALS
SYSTOLIC BLOOD PRESSURE: 112 MMHG | HEART RATE: 72 BPM | WEIGHT: 228 LBS | RESPIRATION RATE: 16 BRPM | DIASTOLIC BLOOD PRESSURE: 72 MMHG | HEIGHT: 61 IN | BODY MASS INDEX: 43.05 KG/M2 | TEMPERATURE: 99.3 F

## 2021-11-17 DIAGNOSIS — B49 FUNGAL INFECTION: ICD-10-CM

## 2021-11-17 DIAGNOSIS — N92.6 MISSED PERIOD: Primary | ICD-10-CM

## 2021-11-17 DIAGNOSIS — L25.9 CONTACT DERMATITIS, UNSPECIFIED CONTACT DERMATITIS TYPE, UNSPECIFIED TRIGGER: ICD-10-CM

## 2021-11-17 LAB
HCG SERPL QL: NEGATIVE
HOLD SPECIMEN: NORMAL
KOH PREPARATION: ABNORMAL
KOH PREPARATION: ABNORMAL

## 2021-11-17 PROCEDURE — 99215 OFFICE O/P EST HI 40 MIN: CPT | Performed by: NURSE PRACTITIONER

## 2021-11-17 PROCEDURE — 36415 COLL VENOUS BLD VENIPUNCTURE: CPT | Performed by: NURSE PRACTITIONER

## 2021-11-17 PROCEDURE — 84703 CHORIONIC GONADOTROPIN ASSAY: CPT | Performed by: NURSE PRACTITIONER

## 2021-11-17 PROCEDURE — 87220 TISSUE EXAM FOR FUNGI: CPT | Performed by: NURSE PRACTITIONER

## 2021-11-17 RX ORDER — CLOTRIMAZOLE 1 %
CREAM (GRAM) TOPICAL 2 TIMES DAILY
Qty: 60 G | Refills: 1 | Status: SHIPPED | OUTPATIENT
Start: 2021-11-17 | End: 2022-05-10

## 2021-11-17 RX ORDER — PREDNISONE 20 MG/1
TABLET ORAL
Qty: 10 TABLET | Refills: 0 | Status: SHIPPED | OUTPATIENT
Start: 2021-11-17 | End: 2022-02-17

## 2021-11-17 ASSESSMENT — MIFFLIN-ST. JEOR: SCORE: 1647.61

## 2021-11-17 NOTE — NURSING NOTE
"Chief Complaint   Patient presents with     Derm Problem       Initial There were no vitals taken for this visit. Estimated body mass index is 43.63 kg/m  as calculated from the following:    Height as of 6/9/21: 1.543 m (5' 0.75\").    Weight as of 6/9/21: 103.9 kg (229 lb).    Patient presents to the clinic using No DME    Health Maintenance that is potentially due pending provider review:  Immunizations     Pt declines to have .    Is there anyone who you would like to be able to receive your results? Not Applicable  If yes have patient fill out DANE    "

## 2021-11-17 NOTE — PROGRESS NOTES
"  Assessment & Plan     (N92.6) Missed period  (primary encounter diagnosis)  Comment: Pregnancy test negative today if patient does not get her period in 2 weeks recommend retesting pregnancy test and follow-up with me would consider ultrasound  Plan: HCG qualitative, Blood (RFR261)      (B49) Fungal infection  Comment: Fungal infection to lower back  Plan: KOH prep (skin, hair or nails only),         clotrimazole (LOTRIMIN) 1 % external cream      (L25.9) Contact dermatitis, unspecified contact dermatitis type, unspecified trigger  Comment: Contact dermatitis to upper arms  Plan: predniSONE (DELTASONE) 20 MG tablet          45 minutes spent on the date of the encounter doing chart review, review of test results, interpretation of tests, patient visit and documentation          BMI:   Estimated body mass index is 43.44 kg/m  as calculated from the following:    Height as of this encounter: 1.543 m (5' 0.75\").    Weight as of this encounter: 103.4 kg (228 lb).       See Patient Instructions    No follow-ups on file.    JOSE MANUEL Cleaning [facility]    Chino Cortes is a 40 year old who presents for the following health issues     History of Present Illness       She eats 0-1 servings of fruits and vegetables daily.She consumes 2 sweetened beverage(s) daily.She exercises with enough effort to increase her heart rate 9 or less minutes per day.  She exercises with enough effort to increase her heart rate 3 or less days per week.   She is taking medications regularly.       Rash  Onset/Duration: 2 days   Description  Location: arms, back,   Character: blotchy, raised, red, itching   Itching: moderate  Intensity:  moderate  Progression of Symptoms:  worsening  Accompanying signs and symptoms:   Fever: low grade   Body aches or joint pain: no  Sore throat symptoms: no  Recent cold symptoms: no  History:           Previous episodes of similar rash: None  New exposures:  None  Recent " "travel: no  Exposure to similar rash: no  Precipitating or alleviating factors: none   Therapies tried and outcome: none     1. Missed period- took pregnancy test and it was negative, was suppose to get her period 11/4/2021, increased headache which is abnormal for her     Review of Systems   Constitutional, HEENT, cardiovascular, pulmonary, gi and gu systems are negative, except as otherwise noted.      Objective    /72   Pulse 72   Temp 99.3  F (37.4  C) (Tympanic)   Resp 16   Ht 1.543 m (5' 0.75\")   Wt 103.4 kg (228 lb)   LMP 10/04/2021 (Approximate)   BMI 43.44 kg/m    Body mass index is 43.44 kg/m .  Physical Exam   GENERAL: healthy, alert and no distress  EYES: Eyes grossly normal to inspection, PERRL and conjunctivae and sclerae normal  HENT: ear canals and TM's normal, nose and mouth without ulcers or lesions  NECK: no adenopathy, no asymmetry, masses, or scars and thyroid normal to palpation  RESP: lungs clear to auscultation - no rales, rhonchi or wheezes  CV: regular rate and rhythm, normal S1 S2, no S3 or S4, no murmur, click or rub, no peripheral edema and peripheral pulses strong  MS: no gross musculoskeletal defects noted, no edema  SKIN: Examination of the rash to upper arms reveals: Contact dermatitis dry, slightly raised, red patches. Examination of the rash lower back reveals: Fungal infection: flat, intensely inflamed, well-defined, clustered bright red macules  NEURO: Normal strength and tone, mentation intact and speech normal  PSYCH: mentation appears normal, affect normal/bright    Results for orders placed or performed in visit on 11/17/21   HCG qualitative, Blood ([id number])     Status: Normal   Result Value Ref Range    hCG Serum Qualitative Negative Negative   Extra Green Top (Lithium Heparin) Tube     Status: None   Result Value Ref Range    Hold Specimen JIC    BAYLEE prep (skin, hair or nails only)     Status: Abnormal    Specimen: Back, Lower; Skin   Result Value Ref Range    " BAYLEE Preparation Fungal elements seen (A)     KOH Preparation Reference Range: No fungal elements seen. (A)    Extra Tube     Status: None    Narrative    The following orders were created for panel order Extra Tube.  Procedure                               Abnormality         Status                     ---------                               -----------         ------                     Extra Green Top (Lithium...[[id number]]                      Final result                 Please view results for these tests on the individual orders.

## 2021-11-17 NOTE — PATIENT INSTRUCTIONS
Patient Education     Fungal Skin Infection (Tinea)  A fungal infection occurs when too much fungus grows on or in the body. Fungus normally lives on the skin in small amounts and does not cause harm. But when too much grows on the skin, it causes an infection. This is also known as tinea. Fungal skin infections are common and not usually serious.   The infection often starts as a small red area the size of a pea. The skin may turn dry and flaky. The area may itch. As the fungus grows, it spreads out in a red [place]. Because of how it looks, fungal skin infection is often called ringworm, but it is not caused by a worm. Fungal skin infections can occur on many parts of the body. They can grow on the head, chest, arms, buttocks or legs. On the feet, fungal infection is known as  athlete s foot.  It causes itchy, sometimes painful sores between the toes and the bottom or sides of the feet. In the groin, the rash is called  jock itch.    People with weak immune systems can get a fungal infection more easily. This includes people with diabetes or HIV, or who are being treated for cancer. In these cases, the fungal infection can spread and cause severe illness. Fungal infections are also more common in people who are overweight.   In most cases, treatment is done with antifungal cream or ointment. If the infection is on your scalp, you will need to take oral medicine. To confirm the diagnosis of a fungal infection, the healthcare provider may take a small scraping of the skin to be tested in a lab.   Common fungal infections are treated with creams on the skin or oral medicine.  Home care  Follow all instructions when using antifungal cream or ointment on your skin.   General care:    If you were prescribed an oral medicine, read the patient information. Talk with your healthcare provider about the risks and side effects.    Let your skin dry completely after bathing. Carefully dry your feet and between your  toes.    Dress in loose cotton clothing.    Don t scratch the affected area. This can delay healing and may spread the infection. It can also cause a bacterial infection.    Keep your skin clean, but don t wash the skin too much. This can irritate your skin.    Keep in mind that it may take a week before the fungus starts to go away. It can take 2 to 4 weeks to fully clear. To prevent it from coming back, use the medicine until the rash is all gone.  Follow-up care  Follow up with your healthcare provider if the rash does not get better after 10 days of treatment. Also follow up if the rash spreads to other parts of your body.   When to seek medical advice  Call your healthcare provider right away if any of these occur:    Fever of 100.4 F (38 C) or higher, or as directed by your healthcare provider    Redness or swelling that gets worse    Pain that gets worse    Foul-smelling fluid leaking from the skin  [employer] last reviewed this educational content on 8/1/2019 2000-2021 [employer]. All rights reserved. This information is not intended as a substitute for professional medical care. Always follow your healthcare professional's instructions.           Patient Education     Contact Dermatitis  Contact dermatitis is a skin rash caused by something that touches the skin and makes it irritated and inflamed. Your skin may be red, swollen, dry, and may be cracked. Blisters may form and ooze. The rash will itch.   Contact dermatitis often forms on the face and neck, backs of hands, forearms, genitals, and lower legs. But it can affect any area.   People can get contact dermatitis from lots of sources. These include:    Plants such as poison ivy, oak, or sumac    Chemicals in hair dyes and rinses, soaps, solvents, waxes, fingernail polish, and deodorants     Jewelry or watchbands made of nickel or cobalt  Contact dermatitis is not passed from person to person.  Talk with your healthcare provider about  "what may have caused the rash. A type of allergy testing called \"patch testing\" may be used to discover what you are allergic to. You will need to stay away from the source of the rash in the future to prevent it from coming back.   Treatment is done to ease itching and prevent the rash from coming back. The rash should go away in a few days to a few weeks.   Home care  Your healthcare provider may prescribe medicine to ease swelling and itching. Follow all instructions when using these medicines.   General care    Stay away from anything that heats up your skin, such as hot showers or baths, or direct sunlight. This can make itching worse.    Apply cold compresses to soothe your sores to help ease your symptoms. Do this for 30 minutes 3 to 4 times a day. You can make a cold compress by soaking a cloth in cold water. Squeeze out excess water. You can add colloidal oatmeal to the water to help reduce itching. For severe itching in a small area, apply an ice pack wrapped in a thin towel. Do this for 20 minutes 3 to 4 times a day.    You can also try wet dressings. One way to do this is to wear a wet piece of clothing under a dry one. Wear a damp shirt under a dry shirt if your upper body is affected. This can relieve itching and prevent you from scratching the affected area.    You can also help ease large areas of itching by taking a lukewarm bath with colloidal oatmeal added to the water.    Use hydrocortisone cream for redness and irritation, unless another medicine was prescribed. Calamine lotion can also relieve mild symptoms.    Use oral diphenhydramine to help reduce itching. You can buy this antihistamine at [employer] and grocery stores. It can make you sleepy, so use lower doses during the daytime. Don't use diphenhydramine if you have glaucoma or have trouble urinating because of an enlarged prostate.    If a plant causes your rash, make sure to wash your skin and the clothes you were wearing when you came " into contact with the plant. This is to wash away the plant oils that gave you the rash and prevent more or worse symptoms. If you have a pet that's been outdoors, its fur may also have oil from the plant. Bathe your pet with soap or shampoo.    Stay away from the substance or object that causes your symptoms. If you can t stay away from it, wear gloves or some other type of protection    Follow-up care  Follow up with your healthcare provider, or as advised.  When to seek medical advice  Call your healthcare provider or seek medical attention right away if any of these occur:     Spreading of the rash to other parts of your body    Severe swelling of your face, eyelids, mouth, throat or tongue    Trouble urinating due to swelling in the genital area    Fever of 100.4 F (38 C) or higher, or as advised by your provider    Redness or swelling that gets worse    Pain that gets worse    Foul-smelling fluid leaking from the skin    Yellow-brown crusts on the open blisters  [employer] last reviewed this educational content on 8/1/2019 2000-2021 [employer]. All rights reserved. This information is not intended as a substitute for professional medical care. Always follow your healthcare professional's instructions.

## 2022-01-08 ENCOUNTER — HEALTH MAINTENANCE LETTER (OUTPATIENT)
Age: 42
End: 2022-01-08

## 2022-01-27 ENCOUNTER — APPOINTMENT (OUTPATIENT)
Dept: FAMILY MEDICINE | Facility: CLINIC | Age: 42
End: 2022-01-27
Payer: COMMERCIAL

## 2022-02-04 ENCOUNTER — OFFICE VISIT (OUTPATIENT)
Dept: FAMILY MEDICINE | Facility: CLINIC | Age: 42
End: 2022-02-04
Payer: COMMERCIAL

## 2022-02-04 VITALS
HEIGHT: 61 IN | RESPIRATION RATE: 16 BRPM | OXYGEN SATURATION: 100 % | DIASTOLIC BLOOD PRESSURE: 72 MMHG | TEMPERATURE: 98.9 F | BODY MASS INDEX: 44.18 KG/M2 | HEART RATE: 92 BPM | SYSTOLIC BLOOD PRESSURE: 124 MMHG | WEIGHT: 234 LBS

## 2022-02-04 DIAGNOSIS — R51.9 ACUTE NONINTRACTABLE HEADACHE, UNSPECIFIED HEADACHE TYPE: Primary | ICD-10-CM

## 2022-02-04 DIAGNOSIS — R25.2 LEG CRAMPS: ICD-10-CM

## 2022-02-04 LAB
ALBUMIN SERPL-MCNC: 3.4 G/DL (ref 3.4–5)
ALP SERPL-CCNC: 65 U/L (ref 40–150)
ALT SERPL W P-5'-P-CCNC: 19 U/L (ref 0–50)
ANION GAP SERPL CALCULATED.3IONS-SCNC: 4 MMOL/L (ref 3–14)
AST SERPL W P-5'-P-CCNC: 12 U/L (ref 0–45)
BILIRUB SERPL-MCNC: 0.2 MG/DL (ref 0.2–1.3)
BUN SERPL-MCNC: 12 MG/DL (ref 7–30)
CALCIUM SERPL-MCNC: 8.8 MG/DL (ref 8.5–10.1)
CHLORIDE BLD-SCNC: 106 MMOL/L (ref 94–109)
CO2 SERPL-SCNC: 28 MMOL/L (ref 20–32)
CREAT SERPL-MCNC: 0.79 MG/DL (ref 0.52–1.04)
ERYTHROCYTE [DISTWIDTH] IN BLOOD BY AUTOMATED COUNT: 12.5 % (ref 10–15)
FERRITIN SERPL-MCNC: 136 NG/ML (ref 12–150)
GFR SERPL CREATININE-BSD FRML MDRD: >90 ML/MIN/1.73M2
GLUCOSE BLD-MCNC: 89 MG/DL (ref 70–99)
HCT VFR BLD AUTO: 42.2 % (ref 35–47)
HGB BLD-MCNC: 14.7 G/DL (ref 11.7–15.7)
MCH RBC QN AUTO: 29.6 PG (ref 26.5–33)
MCHC RBC AUTO-ENTMCNC: 34.8 G/DL (ref 31.5–36.5)
MCV RBC AUTO: 85 FL (ref 78–100)
PLATELET # BLD AUTO: 334 10E3/UL (ref 150–450)
POTASSIUM BLD-SCNC: 4.3 MMOL/L (ref 3.4–5.3)
PROT SERPL-MCNC: 7.5 G/DL (ref 6.8–8.8)
RBC # BLD AUTO: 4.96 10E6/UL (ref 3.8–5.2)
SODIUM SERPL-SCNC: 138 MMOL/L (ref 133–144)
TSH SERPL DL<=0.005 MIU/L-ACNC: 2.45 MU/L (ref 0.4–4)
VIT B12 SERPL-MCNC: 1422 PG/ML (ref 193–986)
WBC # BLD AUTO: 8.8 10E3/UL (ref 4–11)

## 2022-02-04 PROCEDURE — 83735 ASSAY OF MAGNESIUM: CPT | Performed by: NURSE PRACTITIONER

## 2022-02-04 PROCEDURE — 36415 COLL VENOUS BLD VENIPUNCTURE: CPT | Performed by: NURSE PRACTITIONER

## 2022-02-04 PROCEDURE — 85027 COMPLETE CBC AUTOMATED: CPT | Performed by: NURSE PRACTITIONER

## 2022-02-04 PROCEDURE — 82728 ASSAY OF FERRITIN: CPT | Performed by: NURSE PRACTITIONER

## 2022-02-04 PROCEDURE — 99213 OFFICE O/P EST LOW 20 MIN: CPT | Performed by: NURSE PRACTITIONER

## 2022-02-04 PROCEDURE — 82607 VITAMIN B-12: CPT | Performed by: NURSE PRACTITIONER

## 2022-02-04 PROCEDURE — 82306 VITAMIN D 25 HYDROXY: CPT | Performed by: NURSE PRACTITIONER

## 2022-02-04 PROCEDURE — 80053 COMPREHEN METABOLIC PANEL: CPT | Performed by: NURSE PRACTITIONER

## 2022-02-04 PROCEDURE — 84443 ASSAY THYROID STIM HORMONE: CPT | Performed by: NURSE PRACTITIONER

## 2022-02-04 RX ORDER — CYCLOBENZAPRINE HCL 5 MG
5-10 TABLET ORAL 3 TIMES DAILY PRN
Qty: 30 TABLET | Refills: 0 | Status: SHIPPED | OUTPATIENT
Start: 2022-02-04 | End: 2022-05-10

## 2022-02-04 ASSESSMENT — ANXIETY QUESTIONNAIRES
4. TROUBLE RELAXING: SEVERAL DAYS
GAD7 TOTAL SCORE: 9
7. FEELING AFRAID AS IF SOMETHING AWFUL MIGHT HAPPEN: NOT AT ALL
5. BEING SO RESTLESS THAT IT IS HARD TO SIT STILL: SEVERAL DAYS
3. WORRYING TOO MUCH ABOUT DIFFERENT THINGS: SEVERAL DAYS
1. FEELING NERVOUS, ANXIOUS, OR ON EDGE: MORE THAN HALF THE DAYS
6. BECOMING EASILY ANNOYED OR IRRITABLE: MORE THAN HALF THE DAYS
GAD7 TOTAL SCORE: 9
GAD7 TOTAL SCORE: 9
7. FEELING AFRAID AS IF SOMETHING AWFUL MIGHT HAPPEN: NOT AT ALL
2. NOT BEING ABLE TO STOP OR CONTROL WORRYING: MORE THAN HALF THE DAYS

## 2022-02-04 ASSESSMENT — PATIENT HEALTH QUESTIONNAIRE - PHQ9
SUM OF ALL RESPONSES TO PHQ QUESTIONS 1-9: 9
10. IF YOU CHECKED OFF ANY PROBLEMS, HOW DIFFICULT HAVE THESE PROBLEMS MADE IT FOR YOU TO DO YOUR WORK, TAKE CARE OF THINGS AT HOME, OR GET ALONG WITH OTHER PEOPLE: SOMEWHAT DIFFICULT
SUM OF ALL RESPONSES TO PHQ QUESTIONS 1-9: 9

## 2022-02-04 ASSESSMENT — PAIN SCALES - GENERAL: PAINLEVEL: NO PAIN (0)

## 2022-02-04 ASSESSMENT — MIFFLIN-ST. JEOR: SCORE: 1669.83

## 2022-02-04 NOTE — PATIENT INSTRUCTIONS
Acute nonintractable headache, unspecified headache type  Acute, intermittent headaches since November without any particular precipitators.  Gets every couple days or so.  Has never really had headaches in the past.  Has slight nausea and light sensitivity with headaches, otherwise no other symptoms.  Neurological exam unremarkable, no red flags.  Recommend laboratory work-up to rule out underlying cause.  Encouraged to continue good fluid intake.  Trial of Flexeril as needed for headaches along with Excedrin and Tylenol and ice to forehead.  Will notify patient of lab results and further recommendations.  - CBC with platelets; Future  - Ferritin; Future  - Vitamin B12; Future  - Vitamin D Deficiency; Future  - Comprehensive metabolic panel (BMP + Alb, Alk Phos, ALT, AST, Total. Bili, TP); Future  - cyclobenzaprine (FLEXERIL) 5 MG tablet; Take 1-2 tablets (5-10 mg) by mouth 3 times daily as needed for muscle spasms  - TSH with free T4 reflex; Future    Leg cramps  Intermittent leg cramps in the last 2 weeks.  Good strength throughout.  Recommend lab work to rule out any underlying cause, will notify patient results and further recommendations.  - CBC with platelets; Future  - Ferritin; Future  - Vitamin B12; Future  - Vitamin D Deficiency; Future  - Comprehensive metabolic panel (BMP + Alb, Alk Phos, ALT, AST, Total. Bili, TP); Future  - TSH with free T4 reflex; Future  - Magnesium; Future

## 2022-02-04 NOTE — PROGRESS NOTES
Assessment & Plan     Acute nonintractable headache, unspecified headache type  Acute, intermittent headaches since November without any particular precipitators.  Gets every couple days or so.  Has never really had headaches in the past.  Has slight nausea and light sensitivity with headaches, otherwise no other symptoms.  Neurological exam unremarkable, no red flags.  Recommend laboratory work-up to rule out underlying cause.  Encouraged to continue good fluid intake.  Trial of Flexeril as needed for headaches along with Excedrin and Tylenol and ice to forehead.  Will notify patient of lab results and further recommendations.  - CBC with platelets; Future  - Ferritin; Future  - Vitamin B12; Future  - Vitamin D Deficiency; Future  - Comprehensive metabolic panel (BMP + Alb, Alk Phos, ALT, AST, Total. Bili, TP); Future  - cyclobenzaprine (FLEXERIL) 5 MG tablet; Take 1-2 tablets (5-10 mg) by mouth 3 times daily as needed for muscle spasms  - TSH with free T4 reflex; Future    Leg cramps  Intermittent leg cramps in the last 2 weeks.  Good strength throughout.  Recommend lab work to rule out any underlying cause, will notify patient results and further recommendations.  - CBC with platelets; Future  - Ferritin; Future  - Vitamin B12; Future  - Vitamin D Deficiency; Future  - Comprehensive metabolic panel (BMP + Alb, Alk Phos, ALT, AST, Total. Bili, TP); Future  - TSH with free T4 reflex; Future  - Magnesium; Future           See Patient Instructions. After discussion with patient, patient verbalizes and agreeable to receive AVS instructions via My Chart, not printed today     Return in about 1 month (around 3/4/2022), or if symptoms worsen or fail to improve.    Purnima Hill, KENYA, APRN-CNP   [facility]    Chart documentation with Dragon Voice recognition Software. Although reviewed after completion, some words and grammatical errors may remain.     Chino Cortes is a 41 year old who  "presents for the following health issues:    HPI     Concern - headaches   Onset: few months intermittent   Description: since November had intermittent headaches, no vision changes but hurts behind her eyes with her headache, nausea, sensitive to light   Therapies tried and outcome: tylenol extra strength and Excedrin migraine helps make it manageable     Onset in November without any known precipitator  Not usually one for headache's outside of the occasional tension headache   Usually frontal, behind eyes   Getting every couple of days     Concern -  Leg cramp   Onset: couple weeks ago   Description: right leg/ thigh cramp   Progression of Symptoms:  Not going away   Accompanying Signs & Symptoms: none   Therapies tried and outcome: stretching     Cough and some shortness of breath intermittently since 2020  Recent Flare x 1 month   No chest pain or pressure   When kicks in will feel a burning sensation in lungs     Review of Systems   Constitutional, HEENT, cardiovascular, pulmonary, gi and gu systems are negative, except as otherwise noted.      Objective    /72   Pulse 92   Temp 98.9  F (37.2  C) (Tympanic)   Resp 16   Ht 1.543 m (5' 0.75\")   Wt 106.1 kg (234 lb)   LMP 01/31/2022 (Approximate)   SpO2 100%   BMI 44.58 kg/m    Body mass index is 44.58 kg/m .  Physical Exam   GENERAL APPEARANCE: healthy, alert and no distress  HENT: ear canals and TM's normal and nose and mouth without ulcers or lesions  NECK: no adenopathy, no asymmetry, masses, or scars and thyroid normal to palpation  RESP: lungs clear to auscultation - no rales, rhonchi or wheezes  CV: regular rates and rhythm, normal S1 S2, no S3 or S4 and no murmur, click or rub  ABDOMEN: soft, nontender, without hepatosplenomegaly or masses and bowel sounds normal  MS: extremities normal- no gross deformities noted and peripheral pulses normal  SKIN: no suspicious lesions or rashes  NEURO: Normal strength and tone, mentation intact, speech " normal, gait normal including heel/toe/tandem walking, cranial nerves 2-12 intact and nystagmus negative   PSYCH: mentation appears normal and affect normal/bright    Results for orders placed or performed in visit on 02/04/22   Magnesium     Status: Normal   Result Value Ref Range    Magnesium 2.0 1.8 - 2.6 mg/dL   TSH with free T4 reflex     Status: Normal   Result Value Ref Range    TSH 2.45 0.40 - 4.00 mU/L   Comprehensive metabolic panel (BMP + Alb, Alk Phos, ALT, AST, Total. Bili, TP)     Status: Normal   Result Value Ref Range    Sodium 138 133 - 144 mmol/L    Potassium 4.3 3.4 - 5.3 mmol/L    Chloride 106 94 - 109 mmol/L    Carbon Dioxide (CO2) 28 20 - 32 mmol/L    Anion Gap 4 3 - 14 mmol/L    Urea Nitrogen 12 7 - 30 mg/dL    Creatinine 0.79 0.52 - 1.04 mg/dL    Calcium 8.8 8.5 - 10.1 mg/dL    Glucose 89 70 - 99 mg/dL    Alkaline Phosphatase 65 40 - 150 U/L    AST 12 0 - 45 U/L    ALT 19 0 - 50 U/L    Protein Total 7.5 6.8 - 8.8 g/dL    Albumin 3.4 3.4 - 5.0 g/dL    Bilirubin Total 0.2 0.2 - 1.3 mg/dL    GFR Estimate >90 >60 mL/min/1.73m2   Vitamin D Deficiency     Status: Normal   Result Value Ref Range    Vitamin D, Total (25-Hydroxy) 35 20 - 75 ug/L    Narrative    Season, race, dietary intake, and treatment affect the concentration of 25-hydroxy-Vitamin D. Values may decrease during winter months and increase during summer months. Values 20-29 ug/L may indicate Vitamin D insufficiency and values <20 ug/L may indicate Vitamin D deficiency.    Vitamin D determination is routinely performed by an immunoassay specific for 25 hydroxyvitamin D3.  If an individual is on vitamin D2(ergocalciferol) supplementation, please specify 25 OH vitamin D2 and D3 level determination by LCMSMS test [id number].     Vitamin B12     Status: Abnormal   Result Value Ref Range    Vitamin B12 1,422 (H) 193 - 986 pg/mL   Ferritin     Status: Normal   Result Value Ref Range    Ferritin 136 12 - 150 ng/mL   CBC with platelets     Status:  Normal   Result Value Ref Range    WBC Count 8.8 4.0 - 11.0 10e3/uL    RBC Count 4.96 3.80 - 5.20 10e6/uL    Hemoglobin 14.7 11.7 - 15.7 g/dL    Hematocrit 42.2 35.0 - 47.0 %    MCV 85 78 - 100 fL    MCH 29.6 26.5 - 33.0 pg    MCHC 34.8 31.5 - 36.5 g/dL    RDW 12.5 10.0 - 15.0 %    Platelet Count 334 150 - 450 10e3/uL

## 2022-02-05 LAB — MAGNESIUM SERPL-MCNC: 2 MG/DL (ref 1.8–2.6)

## 2022-02-05 ASSESSMENT — ANXIETY QUESTIONNAIRES: GAD7 TOTAL SCORE: 9

## 2022-02-06 LAB — DEPRECATED CALCIDIOL+CALCIFEROL SERPL-MC: 35 UG/L (ref 20–75)

## 2022-02-17 ENCOUNTER — OFFICE VISIT (OUTPATIENT)
Dept: FAMILY MEDICINE | Facility: CLINIC | Age: 42
End: 2022-02-17
Payer: COMMERCIAL

## 2022-02-17 ENCOUNTER — ANCILLARY PROCEDURE (OUTPATIENT)
Dept: GENERAL RADIOLOGY | Facility: CLINIC | Age: 42
End: 2022-02-17
Attending: NURSE PRACTITIONER
Payer: COMMERCIAL

## 2022-02-17 VITALS
BODY MASS INDEX: 44.58 KG/M2 | RESPIRATION RATE: 14 BRPM | OXYGEN SATURATION: 97 % | DIASTOLIC BLOOD PRESSURE: 72 MMHG | SYSTOLIC BLOOD PRESSURE: 124 MMHG | WEIGHT: 234 LBS | HEART RATE: 72 BPM | TEMPERATURE: 98 F

## 2022-02-17 DIAGNOSIS — J20.9 ACUTE BRONCHITIS WITH SYMPTOMS > 10 DAYS: Primary | ICD-10-CM

## 2022-02-17 DIAGNOSIS — J20.9 ACUTE BRONCHITIS WITH SYMPTOMS > 10 DAYS: ICD-10-CM

## 2022-02-17 PROCEDURE — 71046 X-RAY EXAM CHEST 2 VIEWS: CPT | Performed by: RADIOLOGY

## 2022-02-17 PROCEDURE — 99214 OFFICE O/P EST MOD 30 MIN: CPT | Performed by: NURSE PRACTITIONER

## 2022-02-17 RX ORDER — BENZONATATE 200 MG/1
200 CAPSULE ORAL 3 TIMES DAILY PRN
Qty: 30 CAPSULE | Refills: 0 | Status: SHIPPED | OUTPATIENT
Start: 2022-02-17 | End: 2022-05-10

## 2022-02-17 RX ORDER — DOXYCYCLINE HYCLATE 100 MG
100 TABLET ORAL 2 TIMES DAILY
Qty: 20 TABLET | Refills: 0 | Status: SHIPPED | OUTPATIENT
Start: 2022-02-17 | End: 2022-02-27

## 2022-02-17 RX ORDER — PREDNISONE 20 MG/1
40 TABLET ORAL DAILY
Qty: 10 TABLET | Refills: 0 | Status: SHIPPED | OUTPATIENT
Start: 2022-02-17 | End: 2022-02-22

## 2022-02-17 NOTE — PROGRESS NOTES
Assessment & Plan     Acute bronchitis with symptoms > 10 days  Symptomatic care strategies reviewed.  Chest x-ray today.  - XR Chest 2 Views  Begin new medication  - benzonatate (TESSALON) 200 MG capsule  Dispense: 30 capsule; Refill: 0  - doxycycline hyclate (VIBRA-TABS) 100 MG tablet  Dispense: 20 tablet; Refill: 0  - predniSONE (DELTASONE) 20 MG tablet  Dispense: 10 tablet; Refill: 0    Call or return to the clinic with any worsening of symptoms or no resolution. Patient/Parent verbalized understanding and is in agreement. Medication side effects reviewed.   Current Outpatient Medications   Medication Sig Dispense Refill     benzonatate (TESSALON) 200 MG capsule Take 1 capsule (200 mg) by mouth 3 times daily as needed for cough 30 capsule 0     doxycycline hyclate (VIBRA-TABS) 100 MG tablet Take 1 tablet (100 mg) by mouth 2 times daily for 10 days 20 tablet 0     predniSONE (DELTASONE) 20 MG tablet Take 2 tablets (40 mg) by mouth daily for 5 days 10 tablet 0     clotrimazole (LOTRIMIN) 1 % external cream Apply topically 2 times daily (Patient not taking: Reported on 2/4/2022) 60 g 1     cyclobenzaprine (FLEXERIL) 5 MG tablet Take 1-2 tablets (5-10 mg) by mouth 3 times daily as needed for muscle spasms 30 tablet 0     ENSKYCE 0.15-30 MG-MCG tablet Take 1 tablet by mouth once daily 84 tablet 1     Chart documentation with Dragon Voice recognition Software. Although reviewed after completion, some words and grammatical errors may remain.    JOSE MANUEL Harding CNP  [facility]    Chino Cortes is a 41 year old who presents for the following health issues     HPI     Concern - headaches   Onset: few months intermittent   Description: since November had intermittent headaches, no vision changes but hurts behind her eyes with her headache, nausea, sensitive to light   Therapies tried and outcome: tylenol extra strength and Excedrin migraine helps make it manageable      2 1/2  weeks of straight headaches.   January SHORTNESS OF BREATH started before his.   Anxiety attacks triggers her SHORTNESS OF BREATH  Triggers are work and life.   COVID  Mid January    excedrine migraine and tylenol ES.   Went away on its own. Light sensitivity   wheezing and SHORTNESS OF BREATH with exertion  Non smoker. No chemical exposure in the past.       Acute Illness  Acute illness concerns: shortness of breath and cough   Onset/Duration: 1 month   Symptoms:  Fever: no  Chills/Sweats: no  Headache (location?): YES  Sinus Pressure: YES  Conjunctivitis:  no  Ear Pain: YES: bilateral  Rhinorrhea: YES  Congestion: YES  Sore Throat: YES  Cough: YES-non-productive  Wheeze: no  Decreased Appetite: no  Therapies tried and outcome: OTc meds          Review of Systems   Constitutional, HEENT, cardiovascular, pulmonary, GI, , musculoskeletal, neuro, skin, endocrine and psych systems are negative, except as otherwise noted.      Objective    LMP 01/31/2022 (Approximate)   There is no height or weight on file to calculate BMI.  Physical Exam   GENERAL: healthy, alert and no distress  EYES: Eyes grossly normal to inspection, PERRL and conjunctivae and sclerae normal  HENT: ear canals and TM's normal, nasal turbinates are red boggy and engorged with yellowish-green drainage and mouth without ulcers or lesions  NECK: no adenopathy, no asymmetry, masses, or scars and thyroid normal to palpation  RESP: lungs rhonchi with slight expiratory wheeze   CV: regular rate and rhythm, normal S1 S2, no S3 or S4, no murmur, click or rub, no peripheral edema and peripheral pulses strong  ABDOMEN: soft, nontender, no hepatosplenomegaly, no masses and bowel sounds normal  MS: no gross musculoskeletal defects noted, no edema  SKIN: no suspicious lesions or rashes  NEURO: Normal strength and tone, mentation intact and speech normal  PSYCH: mentation appears normal, affect normal/bright  Narrative & Impression    XR CHEST 2 VW  2/17/2022 11:12 AM       INDICATION: Acute bronchitis with symptoms greater than 10 days  COMPARISON: 11/18/2020                                                                       IMPRESSION: Negative chest.     TANISHA DIXON MD         SYSTEM ID:  [id number]           Office Visit on 02/04/2022   Component Date Value Ref Range Status     Magnesium 02/04/2022 2.0  1.8 - 2.6 mg/dL Final     TSH 02/04/2022 2.45  0.40 - 4.00 mU/L Final     Sodium 02/04/2022 138  133 - 144 mmol/L Final     Potassium 02/04/2022 4.3  3.4 - 5.3 mmol/L Final     Chloride 02/04/2022 106  94 - 109 mmol/L Final     Carbon Dioxide (CO2) 02/04/2022 28  20 - 32 mmol/L Final     Anion Gap 02/04/2022 4  3 - 14 mmol/L Final     Urea Nitrogen 02/04/2022 12  7 - 30 mg/dL Final     Creatinine 02/04/2022 0.79  0.52 - 1.04 mg/dL Final     Calcium 02/04/2022 8.8  8.5 - 10.1 mg/dL Final     Glucose 02/04/2022 89  70 - 99 mg/dL Final     Alkaline Phosphatase 02/04/2022 65  40 - 150 U/L Final     AST 02/04/2022 12  0 - 45 U/L Final     ALT 02/04/2022 19  0 - 50 U/L Final     Protein Total 02/04/2022 7.5  6.8 - 8.8 g/dL Final     Albumin 02/04/2022 3.4  3.4 - 5.0 g/dL Final     Bilirubin Total 02/04/2022 0.2  0.2 - 1.3 mg/dL Final     GFR Estimate 02/04/2022 >90  >60 mL/min/1.73m2 Final    Effective December 21, 2021 eGFRcr in adults is calculated using the 2021 CKD-EPI creatinine equation which includes age and gender (Lewis trevino al., NEJM, DOI: 10.1056/LTZXqt7831960)     Vitamin D, Total (25-Hydroxy) 02/04/2022 35  20 - 75 ug/L Final     Vitamin B12 02/04/2022 1,422 (A) 193 - 986 pg/mL Final     Ferritin 02/04/2022 136  12 - 150 ng/mL Final     WBC Count 02/04/2022 8.8  4.0 - 11.0 10e3/uL Final     RBC Count 02/04/2022 4.96  3.80 - 5.20 10e6/uL Final     Hemoglobin 02/04/2022 14.7  11.7 - 15.7 g/dL Final     Hematocrit 02/04/2022 42.2  35.0 - 47.0 % Final     MCV 02/04/2022 85  78 - 100 fL Final     MCH 02/04/2022 29.6  26.5 - 33.0 pg  Final     MCHC 02/04/2022 34.8  31.5 - 36.5 g/dL Final     RDW 02/04/2022 12.5  10.0 - 15.0 % Final     Platelet Count 02/04/2022 334  150 - 450 10e3/uL Final

## 2022-02-17 NOTE — PATIENT INSTRUCTIONS

## 2022-02-25 ENCOUNTER — MYC MEDICAL ADVICE (OUTPATIENT)
Dept: FAMILY MEDICINE | Facility: CLINIC | Age: 42
End: 2022-02-25
Payer: COMMERCIAL

## 2022-04-15 ENCOUNTER — OFFICE VISIT (OUTPATIENT)
Dept: OBGYN | Facility: CLINIC | Age: 42
End: 2022-04-15
Payer: COMMERCIAL

## 2022-04-15 VITALS
SYSTOLIC BLOOD PRESSURE: 131 MMHG | DIASTOLIC BLOOD PRESSURE: 87 MMHG | BODY MASS INDEX: 44.6 KG/M2 | RESPIRATION RATE: 18 BRPM | WEIGHT: 236.2 LBS | TEMPERATURE: 99.3 F | HEIGHT: 61 IN | HEART RATE: 96 BPM

## 2022-04-15 DIAGNOSIS — R45.86 MOOD DISTURBANCE: ICD-10-CM

## 2022-04-15 DIAGNOSIS — R10.2 PELVIC PAIN IN FEMALE: ICD-10-CM

## 2022-04-15 DIAGNOSIS — N92.6 IRREGULAR PERIODS: Primary | ICD-10-CM

## 2022-04-15 LAB
HCG UR QL: NEGATIVE
TSH SERPL DL<=0.005 MIU/L-ACNC: 2.64 MU/L (ref 0.4–4)

## 2022-04-15 PROCEDURE — 84443 ASSAY THYROID STIM HORMONE: CPT | Performed by: OBSTETRICS & GYNECOLOGY

## 2022-04-15 PROCEDURE — 99204 OFFICE O/P NEW MOD 45 MIN: CPT | Performed by: OBSTETRICS & GYNECOLOGY

## 2022-04-15 PROCEDURE — 36415 COLL VENOUS BLD VENIPUNCTURE: CPT | Performed by: OBSTETRICS & GYNECOLOGY

## 2022-04-15 PROCEDURE — 81025 URINE PREGNANCY TEST: CPT | Performed by: OBSTETRICS & GYNECOLOGY

## 2022-04-15 NOTE — PROGRESS NOTES
"[facility]  OB/GYN Clinic   Gynecology Consult Note    CC:     Chief Complaint   Patient presents with     Consult     Mood/Irregular Periods       HPI: Ms. Schuster is a 41 year old  being seen for GYN consultation for concerns about periods and mood swings.       GYN Hx: Reports regular menses every 28-30 days on OCPs lasting originally 5 days, in recent years down to 3 days. They are coming later in her placebo week. These also have been decreased in intensity from \"heavy\" now to subjectively light. Not truly \"irregular.\" Denies significant dysmenorrhea or spotting in between periods. Denies any hx of STI or PID. Currently sexually active with one partner with no concern for STIs. Currently using OCP for contraception for the past 13 years, no h/o IUD use. Denies any vaginal discharge, vulvar itching/burning or pain. Does not miss pills. She is a non-smoker. She denies a history of significant migraines with aura or hypertension.     She also has a medical history of PMDD in the chart with increase in mood symptoms in past month including lability and irritability and down mood, no manic symptoms. Feels like moods are all over the place. Wonders if hormones are off. Was prescribed prozac, but doesn't remember taking it.     Additionally, she reports pain over her ovaries. Shapr stabbing pain that comes randomly. Has a hx of ovarian cyst that was managed expectantly. L>R, but feels the pain twinges on both sides.     ROS: A 10 pt ROS was completed and found to be otherwise negative unless mentioned in the HPI.     PMH:   Past Medical History:   Diagnosis Date     Metrorrhagia 2013       PSHx:   Past Surgical History:   Procedure Laterality Date     ABDOMEN SURGERY  ,    c-sections     C/SECTION, LOW TRANSVERSE  3-    , Low Transverse     C/SECTION, LOW TRANSVERSE       CHOLECYSTECTOMY  2009     COLONOSCOPY  2013    Procedure: COLONOSCOPY;  " Colonoscopy  ;  Surgeon: Jorge Underwood MD;  Location: [facility]     GALLBLADDER SURGERY         OBHx:   OB History    Para Term  AB Living   2 2 2 0 0 1   SAB IAB Ectopic Multiple Live Births   0 0 0 0 1      # Outcome Date GA Lbr Moshe/2nd Weight Sex Delivery Anes PTL Lv   2 Term 09 [id number]  3.232 kg (7 lb 2 oz) M CS-LTranv Spinal N FIONA      Name: Jett      Apgar1: 8  Apgar5: 9   1 Term 04    F CS-LTranv          Medications:   ENSKYCE 0.15-30 MG-MCG tablet, Take 1 tablet by mouth once daily  benzonatate (TESSALON) 200 MG capsule, Take 1 capsule (200 mg) by mouth 3 times daily as needed for cough (Patient not taking: Reported on 4/15/2022)  clotrimazole (LOTRIMIN) 1 % external cream, Apply topically 2 times daily (Patient not taking: No sig reported)  cyclobenzaprine (FLEXERIL) 5 MG tablet, Take 1-2 tablets (5-10 mg) by mouth 3 times daily as needed for muscle spasms (Patient not taking: Reported on 4/15/2022)    No current facility-administered medications on file prior to visit.      Allergies:     Allergies   Allergen Reactions     Augmentin Rash     Rash      Pcn [Penicillins]      See augmentin reaction      Lavender Oil Rash       Social History:   Social History     Socioeconomic History     Marital status:      Spouse name: Not on file     Number of children: Not on file     Years of education: Not on file     Highest education level: Not on file   Occupational History     Not on file   Tobacco Use     Smoking status: Never Smoker     Smokeless tobacco: Never Used   Substance and Sexual Activity     Alcohol use: No     Comment: rare     Drug use: No     Sexual activity: Yes     Partners: Male     Birth control/protection: Pill   Other Topics Concern     Parent/sibling w/ CABG, MI or angioplasty before 65F 55M? No   Social History Narrative     Not on file     Social Determinants of Health     Financial Resource Strain: Not on file   Food Insecurity: Not on file  "  Transportation Needs: Not on file   Physical Activity: Not on file   Stress: Not on file   Social Connections: Not on file   Intimate Partner Violence: Not on file   Housing Stability: Not on file     Social History     Socioeconomic History     Marital status:      Spouse name: None     Number of children: None     Years of education: None     Highest education level: None   Tobacco Use     Smoking status: Never Smoker     Smokeless tobacco: Never Used   Substance and Sexual Activity     Alcohol use: No     Comment: rare     Drug use: No     Sexual activity: Yes     Partners: Male     Birth control/protection: Pill   Other Topics Concern     Parent/sibling w/ CABG, MI or angioplasty before 65F 55M? No       Family History:   Family History   Problem Relation Age of Onset     Cancer Maternal Grandmother      Circulatory Mother 43        carotid and leg stents       Physical Exam:   Vitals:    04/15/22 1118   BP: 131/87   BP Location: Right arm   Patient Position: Chair   Cuff Size: Adult Large   Pulse: 96   Resp: 18   Temp: 99.3  F (37.4  C)   TempSrc: Tympanic   Weight: 107.1 kg (236 lb 3.2 oz)   Height: 1.543 m (5' 0.75\")      Estimated body mass index is 45 kg/m  as calculated from the following:    Height as of this encounter: 1.543 m (5' 0.75\").    Weight as of this encounter: 107.1 kg (236 lb 3.2 oz).    Gen: Pleasant, talkative, obese female in no apparent distress   Respiratory: breathing comfortably on room air   Cardiac: Regular rate, warm and well-perfused.   GI: Abd soft and non-tender  : External genitalia is free of lesion. Urethra and bartholin glands normal.  Vaginal mucosa is moist and pink without unusual discharge.  Cervix is without lesions or discharge. Bimanual exam reveals mobile anteverted uterus without cervical motion tenderness.  Adenexa are mobile and non-tender bilaterally. No appreciable adnexal enlargement.    Rectal: no masses or hemorrhoids visually appreciated  Derm: no " acanthosis nigricans, ance or facial/back/abdominal hair growth patterns   MSK: Grossly normal movement of all four extremities  Psych: mood and affect bright   Lower extremity: edema not present     A&P:   # Change in menses  Recent decrease in intensity and duration of menses on long-term OCPs. Unconcerning at this time as this is likely 2/2 long-term OCP changes. Benign exam. Differential also includes premature ovarian failure, PCOS, and pregnancy. Will continue to monitor, and will order TSH, TVUS, and hCG.     # PMDD  Recent increase in mood lability in setting of known history of PMDD. No concern for michelle or SI. Has previously been on SSRI but did not take it regularly and has not taken it in years. Doesn't desire rx or therapy at this time, content to continue to monitor symptoms. Will check TSH.     #Pain over ovaries: pelvic US ordered     Health maintenance: none at this time  Return to clinic as needed and for regular health maintenance.  NATALYA Pierce [facility] MS3 04/15/22 12:06 PM    I agree with the medical student's documentation above and have edited it for accuracy. I was present for and performed the physical exam and interview of the patient myself. All medical decision-making was performed by me. If a procedure was performed, that was performed by me. Additionally, if billing is based on time, I am only using the time that I personally spent with the patient in my time statement.     Nataly Hurd MD  OB/GYN

## 2022-04-15 NOTE — PROGRESS NOTES
"Initial /87 (BP Location: Right arm, Patient Position: Chair, Cuff Size: Adult Large)   Pulse 96   Temp 99.3  F (37.4  C) (Tympanic)   Resp 18   Ht 1.543 m (5' 0.75\")   Wt 107.1 kg (236 lb 3.2 oz)   LMP 03/25/2022 (Approximate)   BMI 45.00 kg/m   Estimated body mass index is 45 kg/m  as calculated from the following:    Height as of this encounter: 1.543 m (5' 0.75\").    Weight as of this encounter: 107.1 kg (236 lb 3.2 oz). .      "

## 2022-04-18 ENCOUNTER — HOSPITAL ENCOUNTER (OUTPATIENT)
Dept: ULTRASOUND IMAGING | Facility: CLINIC | Age: 42
Discharge: HOME | End: 2022-04-18
Attending: OBSTETRICS & GYNECOLOGY | Admitting: OBSTETRICS & GYNECOLOGY
Payer: COMMERCIAL

## 2022-04-18 DIAGNOSIS — R10.2 PELVIC PAIN IN FEMALE: ICD-10-CM

## 2022-04-18 PROCEDURE — 76856 US EXAM PELVIC COMPLETE: CPT

## 2022-05-02 ENCOUNTER — MYC MEDICAL ADVICE (OUTPATIENT)
Dept: FAMILY MEDICINE | Facility: CLINIC | Age: 42
End: 2022-05-02

## 2022-05-10 ENCOUNTER — TELEPHONE (OUTPATIENT)
Dept: FAMILY MEDICINE | Facility: CLINIC | Age: 42
End: 2022-05-10

## 2022-05-10 ENCOUNTER — VIRTUAL VISIT (OUTPATIENT)
Dept: FAMILY MEDICINE | Facility: CLINIC | Age: 42
End: 2022-05-10
Payer: COMMERCIAL

## 2022-05-10 VITALS — WEIGHT: 237 LBS | BODY MASS INDEX: 45.15 KG/M2

## 2022-05-10 DIAGNOSIS — E66.01 MORBID OBESITY WITH BMI OF 40.0-44.9, ADULT (H): ICD-10-CM

## 2022-05-10 PROCEDURE — 99214 OFFICE O/P EST MOD 30 MIN: CPT | Mod: 95 | Performed by: NURSE PRACTITIONER

## 2022-05-10 RX ORDER — TOPIRAMATE 25 MG/1
TABLET, FILM COATED ORAL
Qty: 60 TABLET | Refills: 0 | Status: SHIPPED | OUTPATIENT
Start: 2022-05-10 | End: 2022-06-07

## 2022-05-10 RX ORDER — PHENTERMINE HYDROCHLORIDE 15 MG/1
15 CAPSULE ORAL EVERY MORNING
Qty: 30 CAPSULE | Refills: 0 | Status: SHIPPED | OUTPATIENT
Start: 2022-05-10 | End: 2022-06-07

## 2022-05-10 ASSESSMENT — PAIN SCALES - GENERAL: PAINLEVEL: NO PAIN (0)

## 2022-05-10 NOTE — PROGRESS NOTES
"Sebastian is a 41 year old who is being evaluated via a billable video visit.      How would you like to obtain your AVS? MyChart  If the video visit is dropped, the invitation should be resent by: Text to cell phone: [phone]  Will anyone else be joining your video visit? No      Video Start Time: 10:36 AM    Assessment & Plan     Morbid obesity with BMI of 40.0-44.9, adult (H)  Chronic, history of diet and exercise in the past without much improvement in weight loss.  Currently not on any weight loss program.  Interested in pursuing bariatric surgery.  Patient has not trialed any weight loss medication in the past and has not met with a nutritionist.  Recommend nutrition evaluation, patient is agreeable to this.  Referral placed.  Also discussed a trial of medication for weight loss, patient is open to this as well, will have patient start phentermine and topiramate with follow-up in person in 1 month for recheck.  Weight management referral placed to start the process of surgery.  - Comprehensive Weight Management; Future  - phentermine (ADIPEX-P) 15 MG capsule; Take 1 capsule (15 mg) by mouth every morning  - topiramate (TOPAMAX) 25 MG tablet; Take 1 tablet (25 mg) by mouth daily for 5 days, THEN 1 tablet (25 mg) 2 times daily for 26 days.  - Nutrition Referral; Future             BMI:   Estimated body mass index is 45.15 kg/m  as calculated from the following:    Height as of 4/15/22: 1.543 m (5' 0.75\").    Weight as of this encounter: 107.5 kg (237 lb).   Weight management plan: As above    See Patient Instructions    Return in about 1 month (around 6/10/2022) for Recheck.    JOSE MANUEL Love CNP  [facility]    Subjective   Sebastian is a 41 year old who presents for the following health issues     HPI     Chief Complaint   Patient presents with     Weight Problem     Referral     Patient would like referral for gastric bypass surgery     Wt Readings from Last 4 Encounters: "   05/10/22 107.5 kg (237 lb)   04/15/22 107.1 kg (236 lb 3.2 oz)   02/17/22 106.1 kg (234 lb)   02/04/22 106.1 kg (234 lb)     BP Readings from Last 3 Encounters:   04/15/22 131/87   02/17/22 124/72   02/04/22 124/72       How many servings of fruits and vegetables do you eat daily?  2-3    On average, how many sweetened beverages do you drink each day (Examples: soda, juice, sweet tea, etc.  Do NOT count diet or artificially sweetened beverages)?   0    How many days per week do you exercise enough to make your heart beat faster? 3 or less    How many minutes a day do you exercise enough to make your heart beat faster? 30 - 60  How many days per week do you miss taking your medication?     What makes it hard for you to take your medications?  patient has not ever tried a prescription medication for her weight      Has tried the whole 30/clean eating diet  Beach body and used to do cross fit on a regular basis until gym closed down   Not much exercise and diet recently   Has always been an overeater, stress eater   Just moved into new home in November   Cut back on soda intake      Review of Systems   Constitutional, HEENT, cardiovascular, pulmonary, gi and gu systems are negative, except as otherwise noted.      Objective    Vitals - Patient Reported  Pain Score: No Pain (0) (n/a to visit type)      Vitals:  No vitals were obtained today due to virtual visit.    Physical Exam   GENERAL: Healthy, alert and no distress  EYES: Eyes grossly normal to inspection.  No discharge or erythema, or obvious scleral/conjunctival abnormalities.  RESP: No audible wheeze, cough, or visible cyanosis.  No visible retractions or increased work of breathing.    SKIN: Visible skin clear. No significant rash, abnormal pigmentation or lesions.  NEURO: Cranial nerves grossly intact.  Mentation and speech appropriate for age.  PSYCH: Mentation appears normal, affect normal/bright, judgement and insight intact, normal speech and appearance  well-groomed.    Diagnostic Test Results:  none            Video-Visit Details    Type of service:  Video Visit    Video End Time:10:52 AM    Originating Location (pt. Location): Home    Distant Location (provider location):  [facility]     Platform used for Video Visit: [employer]     Chart documentation with Dragon Voice recognition Software. Although reviewed after completion, some words and grammatical errors may remain.

## 2022-05-10 NOTE — PATIENT INSTRUCTIONS
Morbid obesity with BMI of 40.0-44.9, adult (H)  Chronic, history of diet and exercise in the past without much improvement in weight loss.  Currently not on any weight loss program.  Interested in pursuing bariatric surgery.  Patient has not trialed any weight loss medication in the past and has not met with a nutritionist.  Recommend nutrition evaluation, patient is agreeable to this.  Referral placed.  Also discussed a trial of medication for weight loss, patient is open to this as well, will have patient start phentermine and topiramate with follow-up in person in 1 month for recheck.  Weight management referral placed to start the process of surgery.  - Comprehensive Weight Management; Future  - phentermine (ADIPEX-P) 15 MG capsule; Take 1 capsule (15 mg) by mouth every morning  - topiramate (TOPAMAX) 25 MG tablet; Take 1 tablet (25 mg) by mouth daily for 5 days, THEN 1 tablet (25 mg) 2 times daily for 26 days.  - Nutrition Referral; Future

## 2022-05-10 NOTE — TELEPHONE ENCOUNTER
Central Prior Authorization Team   Phone: [phone]      PRIOR AUTHORIZATION DENIED    Medication: phentermine (ADIPEX-P) 15 MG capsule - EPA Denied     Denial Date: 5/10/2022    Denial Rational:   Per your health plan's criteria, this drug is covered if you meet the following:  You had a weight loss of more than or equal to 5% of your baseline body weight.  The information provided does not show that you meet the criteria listed above.  Please speak with your doctor about your choices.        Appeal Information: If the Provider/Patient would like to appeal this denial, please provide a letter of medical necessity explaining why Preferred Formulary medications are not appropriate in the treatment of the patient's condition; along with any previous therapies tried/failed.    Once completed, please route back to me directly: Tracy Cazares

## 2022-05-13 DIAGNOSIS — N92.1 METRORRHAGIA: ICD-10-CM

## 2022-05-13 RX ORDER — DESOGESTREL AND ETHINYL ESTRADIOL 0.15-0.03
KIT ORAL
Qty: 84 TABLET | Refills: 0 | Status: SHIPPED | OUTPATIENT
Start: 2022-05-13 | End: 2022-07-18

## 2022-06-07 ENCOUNTER — VIRTUAL VISIT (OUTPATIENT)
Dept: FAMILY MEDICINE | Facility: CLINIC | Age: 42
End: 2022-06-07
Payer: COMMERCIAL

## 2022-06-07 VITALS — BODY MASS INDEX: 43.38 KG/M2 | WEIGHT: 227.7 LBS

## 2022-06-07 DIAGNOSIS — E66.01 MORBID OBESITY WITH BMI OF 40.0-44.9, ADULT (H): ICD-10-CM

## 2022-06-07 PROCEDURE — 99213 OFFICE O/P EST LOW 20 MIN: CPT | Mod: 95 | Performed by: NURSE PRACTITIONER

## 2022-06-07 RX ORDER — PHENTERMINE HYDROCHLORIDE 15 MG/1
15 CAPSULE ORAL EVERY MORNING
Qty: 30 CAPSULE | Refills: 0 | Status: SHIPPED | OUTPATIENT
Start: 2022-06-07 | End: 2022-07-18

## 2022-06-07 RX ORDER — TOPIRAMATE 25 MG/1
25 TABLET, FILM COATED ORAL 2 TIMES DAILY
Qty: 60 TABLET | Refills: 0 | Status: SHIPPED | OUTPATIENT
Start: 2022-06-07 | End: 2022-07-18

## 2022-06-07 ASSESSMENT — PAIN SCALES - GENERAL: PAINLEVEL: NO PAIN (0)

## 2022-06-07 NOTE — PROGRESS NOTES
Sebastian is a 41 year old who is being evaluated via a billable video visit.      How would you like to obtain your AVS? MyChart  If the video visit is dropped, the invitation should be resent by: Text to cell phone: [phone]  Will anyone else be joining your video visit? No      Video Start Time: 10:00 AM    Assessment & Plan     Morbid obesity with BMI of 40.0-44.9, adult (H)  Chronic, started on phentermine and topiramate last month.  Patient's weight down 10 pounds since starting, feels well.  Tolerating medications without any side effects.  Has noted decreased appetite, decreasing portion sizes and choosing healthier options.  Is active with yard work, otherwise no particular exercise regimen.  Comprehensive weight management referral placed at last office visit, patient would like to hold off and continue medication route as long as possible to help with weight loss.  Nutrition referral also placed, patient has not scheduled yet.  Highly advised to schedule this for further nutrition education.  Recommend increasing physical activity slightly outside of normal.  We will continue current doses of medications and follow-up in person in 1 month for recheck.  - phentermine (ADIPEX-P) 15 MG capsule; Take 1 capsule (15 mg) by mouth every morning  - topiramate (TOPAMAX) 25 MG tablet; Take 1 tablet (25 mg) by mouth 2 times daily             See Patient Instructions    Return in about 1 month (around 7/7/2022) for Recheck.    Purnima Hill, KENYA, APRN-CNP   [facility]    Subjective   Sebastian is a 41 year old who presents for the following health issues     HPI     Medication Followup of weight loss medication    Taking Medication as prescribed: yes    Side Effects:  None    Medication Helping Symptoms:  yes     Wt Readings from Last 4 Encounters:   06/07/22 103.3 kg (227 lb 11.2 oz)   05/10/22 107.5 kg (237 lb)   04/15/22 107.1 kg (236 lb 3.2 oz)   02/17/22 106.1 kg (234 lb)     Definitely  decreased portion sizes and notes curb in appetite    Still hasn't gotten back into any exercise regimen, doing outside yard work   Weight management called her - would like to stick with this plan if can    Review of Systems   Constitutional, HEENT, cardiovascular, pulmonary, gi and gu systems are negative, except as otherwise noted.      Objective    Vitals - Patient Reported  Pain Score: No Pain (0) (n.a to visit type)      Vitals:  No vitals were obtained today due to virtual visit.    Physical Exam   GENERAL: Healthy, alert and no distress  EYES: Eyes grossly normal to inspection.  No discharge or erythema, or obvious scleral/conjunctival abnormalities.  RESP: No audible wheeze, cough, or visible cyanosis.  No visible retractions or increased work of breathing.    SKIN: Visible skin clear. No significant rash, abnormal pigmentation or lesions.  NEURO: Cranial nerves grossly intact.  Mentation and speech appropriate for age.  PSYCH: Mentation appears normal, affect normal/bright, judgement and insight intact, normal speech and appearance well-groomed.    Diagnostic Test Results:  none            Video-Visit Details    Type of service:  Video Visit    Video End Time:10:09 AM    Originating Location (pt. Location): Home    Distant Location (provider location):  [facility]     Platform used for Video Visit: [employer]     Chart documentation with Dragon Voice recognition Software. Although reviewed after completion, some words and grammatical errors may remain.

## 2022-06-07 NOTE — PATIENT INSTRUCTIONS
Morbid obesity with BMI of 40.0-44.9, adult (H)  Chronic, started on phentermine and topiramate last month.  Patient's weight down 10 pounds since starting, feels well.  Tolerating medications without any side effects.  Has noted decreased appetite, decreasing portion sizes and choosing healthier options.  Is active with yard work, otherwise no particular exercise regimen.  Comprehensive weight management referral placed at last office visit, patient would like to hold off and continue medication route as long as possible to help with weight loss.  Nutrition referral also placed, patient has not scheduled yet.  Highly advised to schedule this for further nutrition education.  Recommend increasing physical activity slightly outside of normal.  We will continue current doses of medications and follow-up in person in 1 month for recheck.  - phentermine (ADIPEX-P) 15 MG capsule; Take 1 capsule (15 mg) by mouth every morning  - topiramate (TOPAMAX) 25 MG tablet; Take 1 tablet (25 mg) by mouth 2 times daily

## 2022-07-18 ENCOUNTER — OFFICE VISIT (OUTPATIENT)
Dept: FAMILY MEDICINE | Facility: CLINIC | Age: 42
End: 2022-07-18
Payer: COMMERCIAL

## 2022-07-18 VITALS
HEIGHT: 60 IN | RESPIRATION RATE: 18 BRPM | OXYGEN SATURATION: 97 % | WEIGHT: 224.6 LBS | DIASTOLIC BLOOD PRESSURE: 74 MMHG | TEMPERATURE: 99.1 F | BODY MASS INDEX: 44.1 KG/M2 | HEART RATE: 85 BPM | SYSTOLIC BLOOD PRESSURE: 104 MMHG

## 2022-07-18 DIAGNOSIS — E66.01 MORBID OBESITY WITH BMI OF 40.0-44.9, ADULT (H): Primary | ICD-10-CM

## 2022-07-18 DIAGNOSIS — N92.1 METRORRHAGIA: ICD-10-CM

## 2022-07-18 PROCEDURE — 99213 OFFICE O/P EST LOW 20 MIN: CPT | Performed by: NURSE PRACTITIONER

## 2022-07-18 RX ORDER — DESOGESTREL AND ETHINYL ESTRADIOL 0.15-0.03
1 KIT ORAL DAILY
Qty: 84 TABLET | Refills: 3 | Status: SHIPPED | OUTPATIENT
Start: 2022-07-18 | End: 2023-07-11

## 2022-07-18 RX ORDER — TOPIRAMATE 25 MG/1
25 TABLET, FILM COATED ORAL 2 TIMES DAILY
Qty: 60 TABLET | Refills: 0 | Status: SHIPPED | OUTPATIENT
Start: 2022-07-18 | End: 2022-10-03

## 2022-07-18 RX ORDER — PHENTERMINE HYDROCHLORIDE 15 MG/1
15 CAPSULE ORAL EVERY MORNING
Qty: 30 CAPSULE | Refills: 0 | Status: SHIPPED | OUTPATIENT
Start: 2022-07-18 | End: 2022-10-04

## 2022-07-18 ASSESSMENT — PAIN SCALES - GENERAL: PAINLEVEL: NO PAIN (0)

## 2022-07-18 NOTE — PATIENT INSTRUCTIONS
Morbid obesity with BMI of 40.0-44.9, adult (H)  Chronic, started on phentermine and topiramate in May.  Has had a 13 pound weight loss.  Denies any side effects, reports good appetite control with a notable decrease in appetite.  We will continue current dosages at this time, encourage patient to improve 1 thing either diet or exercise over the next month.  Follow-up in 1 month in clinic for recheck.  - phentermine (ADIPEX-P) 15 MG capsule; Take 1 capsule (15 mg) by mouth every morning  - topiramate (TOPAMAX) 25 MG tablet; Take 1 tablet (25 mg) by mouth 2 times daily    Metrorrhagia  Chronic, stable.  Continue oral contraceptive without any change.  - desogestrel-ethinyl estradiol (ENSKYCE) 0.15-30 MG-MCG tablet; Take 1 tablet by mouth daily

## 2022-07-18 NOTE — PROGRESS NOTES
Assessment & Plan     Morbid obesity with BMI of 40.0-44.9, adult (H)  Chronic, started on phentermine and topiramate in May.  Has had a 13 pound weight loss.  Denies any side effects, reports good appetite control with a notable decrease in appetite.  We will continue current dosages at this time, encourage patient to improve 1 thing either diet or exercise over the next month.  Follow-up in 1 month in clinic for recheck.  - phentermine (ADIPEX-P) 15 MG capsule; Take 1 capsule (15 mg) by mouth every morning  - topiramate (TOPAMAX) 25 MG tablet; Take 1 tablet (25 mg) by mouth 2 times daily    Metrorrhagia  Chronic, stable.  Continue oral contraceptive without any change.  - desogestrel-ethinyl estradiol (ENSKYCE) 0.15-30 MG-MCG tablet; Take 1 tablet by mouth daily        {Provider  Link to [facility] Help Grid :433000}     See Patient Instructions After discussion with patient, patient verbalizes and agreeable to receive AVS instructions via My Chart, not printed today     Return in about 1 month (around 8/18/2022) for Recheck.    Purnima Hill, DNP, APRN-CNP   [facility]    Chino Cortes is a 41 year old, presenting for the following health issues:  Weight Check (Med refills /)      History of Present Illness       Reason for visit:  Weight loss med refills  Symptom onset:  More than a month  Symptoms include:  Overweight   Symptom progression:  Improving    She eats 0-1 servings of fruits and vegetables daily.She consumes 2 sweetened beverage(s) daily.She exercises with enough effort to increase her heart rate 10 to 19 minutes per day.  She exercises with enough effort to increase her heart rate 3 or less days per week.   She is taking medications regularly.     Started medications in May    Medication Followup of Phentermine    Taking Medication as prescribed: yes    Side Effects:  None    Medication Helping Symptoms:  yes    Medication Followup of topamax    Taking Medication as  "prescribed: yes    Side Effects:  None    Medication Helping Symptoms:  yes    Has been out of the phentermine x 1 week   Does note a decrease in appetite     Wt Readings from Last 3 Encounters:   07/18/22 101.9 kg (224 lb 9.6 oz)   06/07/22 103.3 kg (227 lb 11.2 oz)   05/10/22 107.5 kg (237 lb)        Review of Systems   Constitutional, HEENT, cardiovascular, pulmonary, gi and gu systems are negative, except as otherwise noted.      Objective    /74   Pulse 85   Temp 99.1  F (37.3  C) (Tympanic)   Resp 18   Ht 1.526 m (5' 0.08\")   Wt 101.9 kg (224 lb 9.6 oz)   LMP 06/14/2022 (Exact Date)   SpO2 97%   Breastfeeding No   BMI 43.75 kg/m    Body mass index is 43.75 kg/m .  Physical Exam   GENERAL APPEARANCE: healthy, alert and no distress  NECK: no adenopathy, no asymmetry, masses, or scars and thyroid normal to palpation  RESP: lungs clear to auscultation - no rales, rhonchi or wheezes  CV: regular rates and rhythm, normal S1 S2, no S3 or S4 and no murmur, click or rub  ABDOMEN: soft, nontender, without hepatosplenomegaly or masses, bowel sounds normal and obese  MS: extremities normal- no gross deformities noted and peripheral pulses normal  SKIN: no suspicious lesions or rashes  NEURO: Normal strength and tone, mentation intact and speech normal  PSYCH: mentation appears normal and affect normal/bright    Diagnostic Test Results:  none                .  ..     Chart documentation with Dragon Voice recognition Software. Although reviewed after completion, some words and grammatical errors may remain.   "

## 2022-08-04 ENCOUNTER — E-VISIT (OUTPATIENT)
Dept: URGENT CARE | Facility: CLINIC | Age: 42
End: 2022-08-04
Payer: COMMERCIAL

## 2022-08-04 DIAGNOSIS — N39.0 ACUTE UTI (URINARY TRACT INFECTION): Primary | ICD-10-CM

## 2022-08-04 PROCEDURE — 99421 OL DIG E/M SVC 5-10 MIN: CPT | Performed by: NURSE PRACTITIONER

## 2022-08-04 RX ORDER — NITROFURANTOIN 25; 75 MG/1; MG/1
100 CAPSULE ORAL 2 TIMES DAILY
Qty: 10 CAPSULE | Refills: 0 | Status: SHIPPED | OUTPATIENT
Start: 2022-08-04 | End: 2022-08-09

## 2022-08-04 NOTE — PATIENT INSTRUCTIONS
Dear Sebastian Schuster    After reviewing your responses, I've been able to diagnose you with a urinary tract infection, which is a common infection of the bladder with bacteria.  This is not a sexually transmitted infection, though urinating immediately after intercourse can help prevent infections.  Drinking lots of fluids is also helpful to clear your current infection and prevent the next one.      I have sent a prescription for antibiotics to your pharmacy to treat this infection.    It is important that you take all of your prescribed medication even if your symptoms are improving after a few doses.  Taking all of your medicine helps prevent the symptoms from returning.     If your symptoms worsen, you develop pain in your back or stomach, develop fevers, or are not improving in 5 days, please contact your primary care provider for an appointment or visit any of our convenient Walk-in or Urgent Care Centers to be seen, which can be found on our website here.    Thanks again for choosing us as your health care partner,    JOSE MANUEL Morales CNP    Urinary Tract Infections in Women  Urinary tract infections (UTIs) are most often caused by bacteria. These bacteria enter the urinary tract. The bacteria may come from inside the body. Or they may travel from the skin outside the rectum or vagina into the urethra. Female anatomy makes it easy for bacteria from the bowel to enter a woman s urinary tract, which is the most common source of UTI. This means women develop UTIs more often than men. Pain in or around the urinary tract is a common UTI symptom. But the only way to know for sure if you have a UTI for the healthcare provider to test your urine. The two tests that may be done are the urinalysis and urine culture.     Types of UTIs    Cystitis. A bladder infection (cystitis) is the most common UTI in women. You may have urgent or frequent need to pee. You may also have pain, burning when you pee, and bloody  urine.    Urethritis. This is an inflamed urethra, which is the tube that carries urine from the bladder to outside the body. You may have lower stomach or back pain. You may also have urgent or frequent need to pee.    Pyelonephritis. This is a kidney infection. If not treated, it can be serious and damage your kidneys. In severe cases, you may need to stay in the hospital. You may have a fever and lower back pain.    Medicines to treat a UTI  Most UTIs are treated with antibiotics. These kill the bacteria. The length of time you need to take them depends on the type of infection. It may be as short as 3 days. If you have repeated UTIs, you may need a low-dose antibiotic for several months. Take antibiotics exactly as directed. Don t stop taking them until all of the medicine is gone. If you stop taking the antibiotic too soon, the infection may not go away. You may also develop a resistance to the antibiotic. This can make it much harder to treat.   Lifestyle changes to treat and prevent UTIs   The lifestyle changes below will help get rid of your UTI. They may also help prevent future UTIs.     Drink plenty of fluids. This includes water, juice, or other caffeine-free drinks. Fluids help flush bacteria out of your body.    Empty your bladder. Always empty your bladder when you feel the urge to pee. And always pee before going to sleep. Urine that stays in your bladder can lead to infection. Try to pee before and after sex as well.    Practice good personal hygiene. Wipe yourself from front to back after using the toilet. This helps keep bacteria from getting into the urethra.    Use condoms during sex. These help prevent UTIs caused by sexually transmitted bacteria. Also don't use spermicides during sex. These can increase the risk for UTIs. Choose other forms of birth control instead. For women who tend to get UTIs after sex, a low-dose of a preventive antibiotic may be used. Be sure to discuss this option with  your healthcare provider.    Follow up with your healthcare provider as directed. He or she may test to make sure the infection has cleared. If needed, more treatment may be started.  Nilda last reviewed this educational content on 7/1/2019 2000-2021 [employer]. All rights reserved. This information is not intended as a substitute for professional medical care. Always follow your healthcare professional's instructions.

## 2022-10-03 ENCOUNTER — OFFICE VISIT (OUTPATIENT)
Dept: FAMILY MEDICINE | Facility: CLINIC | Age: 42
End: 2022-10-03
Payer: COMMERCIAL

## 2022-10-03 ENCOUNTER — ANCILLARY PROCEDURE (OUTPATIENT)
Dept: GENERAL RADIOLOGY | Facility: CLINIC | Age: 42
End: 2022-10-03
Attending: NURSE PRACTITIONER
Payer: COMMERCIAL

## 2022-10-03 VITALS
HEART RATE: 86 BPM | DIASTOLIC BLOOD PRESSURE: 84 MMHG | WEIGHT: 223 LBS | OXYGEN SATURATION: 98 % | BODY MASS INDEX: 43.44 KG/M2 | RESPIRATION RATE: 12 BRPM | TEMPERATURE: 98.4 F | SYSTOLIC BLOOD PRESSURE: 118 MMHG

## 2022-10-03 DIAGNOSIS — M25.542 JOINT PAIN IN FINGERS OF BOTH HANDS: ICD-10-CM

## 2022-10-03 DIAGNOSIS — M25.541 JOINT PAIN IN FINGERS OF BOTH HANDS: ICD-10-CM

## 2022-10-03 DIAGNOSIS — K64.4 EXTERNAL HEMORRHOIDS: ICD-10-CM

## 2022-10-03 DIAGNOSIS — E66.01 MORBID OBESITY WITH BMI OF 40.0-44.9, ADULT (H): ICD-10-CM

## 2022-10-03 DIAGNOSIS — E66.01 MORBID OBESITY WITH BMI OF 40.0-44.9, ADULT (H): Primary | ICD-10-CM

## 2022-10-03 PROCEDURE — 73130 X-RAY EXAM OF HAND: CPT | Mod: TC | Performed by: RADIOLOGY

## 2022-10-03 PROCEDURE — 99213 OFFICE O/P EST LOW 20 MIN: CPT | Performed by: NURSE PRACTITIONER

## 2022-10-03 ASSESSMENT — PAIN SCALES - GENERAL: PAINLEVEL: NO PAIN (0)

## 2022-10-03 NOTE — NURSING NOTE
"Chief Complaint   Patient presents with     Weight Check     /84   Pulse 86   Temp 98.4  F (36.9  C) (Tympanic)   Resp 12   Wt 101.2 kg (223 lb)   LMP 09/07/2022   SpO2 98%   BMI 43.44 kg/m   Estimated body mass index is 43.44 kg/m  as calculated from the following:    Height as of 7/18/22: 1.526 m (5' 0.08\").    Weight as of this encounter: 101.2 kg (223 lb).  Patient presents to the clinic using No DME      Health Maintenance that is potentially due pending provider review:    Health Maintenance Due   Topic Date Due     ADVANCE CARE PLANNING  Never done     HEPATITIS C SCREENING  Never done     DTAP/TDAP/TD IMMUNIZATION (2 - Td or Tdap) 09/03/2018     PREVENTIVE CARE VISIT  07/23/2020     COVID-19 Vaccine (2 - Booster for Kimmy series) 07/14/2021     INFLUENZA VACCINE (1) 09/01/2022        Declines flu vaccine .        "

## 2022-10-03 NOTE — PROGRESS NOTES
Assessment & Plan     Morbid obesity with BMI of 40.0-44.9, adult (H)  Chronic, previously on phentermine and topiramate.  Had an approximate 13 pound weight loss from initiation through July 2022.  Patient has been off of medication since mid August due to not refilling.  Feels is still eating smaller amounts even being off of medication.  Has been able to maintain her weight since.  Going into a difficult time of year, patient feels she may benefit from continuing phentermine and topiramate.  We will have patient restart and follow-up with weight check in 1 month.  - phentermine (ADIPEX-P) 15 MG capsule; Take 1 capsule (15 mg) by mouth every morning  - topiramate (TOPAMAX) 25 MG tablet; Take 1 tablet (25 mg) by mouth daily for 7 days, THEN 1 tablet (25 mg) 2 times daily for 24 days.    Joint pain in fingers of both hands  Patient complains of a 2-month history of bilateral finger joint pain with mild swelling.  Denies any previous trauma or injury.  Seems to be worsening.  Will obtain x-rays in office today and notify patient of results and any further recommendations.  Did discuss the probability of osteoarthritis and treating with hand therapy and over-the-counter analgesics such as ibuprofen and/or Tylenol.  - XR Hand Bilateral G/E 3 Views; Future    External hemorrhoids  Chronic history of external hemorrhoids.  Has been using over-the-counter hydrocortisone cream without much improvement.  Will prescribe a higher potency steroid and encourage patient to do sitz bath's at home.  If worsening or not improving, follow-up in clinic.  - hydrocortisone, Perianal, (HYDROCORTISONE) 2.5 % cream; Place rectally 2 times daily as needed for hemorrhoids             See Patient Instructions After discussion with patient, patient verbalizes and agreeable to receive AVS instructions via My Chart, not printed today     Return in about 1 month (around 11/3/2022) for Recheck.    Purnima Hill, KENYA, APRN-[facility]  [facility]    Chino Cortes is a 41 year old, presenting for the following health issues:  Weight Check      History of Present Illness       Reason for visit:  Follow up to medication was on    She eats 0-1 servings of fruits and vegetables daily.She consumes 3 sweetened beverage(s) daily.She exercises with enough effort to increase her heart rate 10 to 19 minutes per day.  She exercises with enough effort to increase her heart rate 3 or less days per week.   She is taking medications regularly.       Medication Followup of Topiramate and Phentramine    Taking Medication as prescribed: NO-went off medication - here for recheck    Wt Readings from Last 4 Encounters:   10/03/22 101.2 kg (223 lb)   07/18/22 101.9 kg (224 lb 9.6 oz)   06/07/22 103.3 kg (227 lb 11.2 oz)   05/10/22 107.5 kg (237 lb)     Still eating smaller amounts     Bilateral finger joint pain   Started approximately 2 months ago  Worsening - going to more fingers         Review of Systems   Constitutional, HEENT, cardiovascular, pulmonary, gi and gu systems are negative, except as otherwise noted.      Objective    /84   Pulse 86   Temp 98.4  F (36.9  C) (Tympanic)   Resp 12   Wt 101.2 kg (223 lb)   LMP 09/07/2022   SpO2 98%   BMI 43.44 kg/m    Body mass index is 43.44 kg/m .  Physical Exam   GENERAL APPEARANCE: healthy, alert and no distress  RESP: lungs clear to auscultation - no rales, rhonchi or wheezes  CV: regular rates and rhythm, normal S1 S2, no S3 or S4 and no murmur, click or rub  ABDOMEN: soft, nontender, without hepatosplenomegaly or masses and bowel sounds normal  MS: extremities normal- no gross deformities noted, peripheral pulses normal and mild tenderness of PIP joint of bilateral third digits and very mild swelling without erythema.  SKIN: no suspicious lesions or rashes  NEURO: Normal strength and tone, mentation intact and speech normal  PSYCH: mentation appears normal and affect  normal/bright    Results for orders placed or performed in visit on 10/03/22   XR Hand Bilateral G/E 3 Views     Status: None    Narrative    XR HAND BILATERAL G/E 3 VIEWS 10/3/2022 12:34 PM    HISTORY: Joint pain in fingers of both hands; Joint pain in fingers of  both hands    COMPARISON: None.      Impression    IMPRESSION: No fracture. No erosive or degenerative changes. Normal  alignment.    YADI BARTLETT MD         SYSTEM ID:  [id number]           Chart documentation with Dragon Voice recognition Software. Although reviewed after completion, some words and grammatical errors may remain.

## 2022-10-04 ENCOUNTER — MYC MEDICAL ADVICE (OUTPATIENT)
Dept: FAMILY MEDICINE | Facility: CLINIC | Age: 42
End: 2022-10-04

## 2022-10-04 RX ORDER — PHENTERMINE HYDROCHLORIDE 15 MG/1
CAPSULE ORAL
Qty: 30 CAPSULE | Refills: 0 | OUTPATIENT
Start: 2022-10-04

## 2022-10-04 RX ORDER — TOPIRAMATE 25 MG/1
TABLET, FILM COATED ORAL
Qty: 55 TABLET | Refills: 0 | Status: SHIPPED | OUTPATIENT
Start: 2022-10-04 | End: 2023-02-27

## 2022-10-04 RX ORDER — TOPIRAMATE 25 MG/1
TABLET, FILM COATED ORAL
Qty: 60 TABLET | Refills: 0 | OUTPATIENT
Start: 2022-10-04

## 2022-10-04 RX ORDER — HYDROCORTISONE 25 MG/G
CREAM TOPICAL 2 TIMES DAILY PRN
Qty: 30 G | Refills: 1 | Status: SHIPPED | OUTPATIENT
Start: 2022-10-04 | End: 2023-02-27

## 2022-10-04 RX ORDER — PHENTERMINE HYDROCHLORIDE 15 MG/1
15 CAPSULE ORAL EVERY MORNING
Qty: 30 CAPSULE | Refills: 0 | Status: SHIPPED | OUTPATIENT
Start: 2022-10-04 | End: 2023-02-27

## 2022-10-04 NOTE — TELEPHONE ENCOUNTER
"Requested Prescriptions   Pending Prescriptions Disp Refills     topiramate (TOPAMAX) 25 MG tablet [Pharmacy Med Name: Topiramate 25 MG Oral Tablet] 60 tablet 0     Sig: Take 1 tablet by mouth twice daily       Anti-Seizure Meds Protocol  Failed - 10/3/2022  3:49 PM        Failed - Review Authorizing provider's last note.      Refer to last progress notes: confirm request is for original authorizing provider (cannot be through other providers).          Passed - Recent (12 mo) or future (30 days) visit within the authorizing provider's specialty     Patient has had an office visit with the authorizing provider or a provider within the authorizing providers department within the previous 12 mos or has a future within next 30 days. See \"Patient Info\" tab in inbasket, or \"Choose Columns\" in Meds & Orders section of the refill encounter.              Passed - Normal CBC on file in past 26 months     Recent Labs   Lab Test 02/04/22  1533   WBC 8.8   RBC 4.96   HGB 14.7   HCT 42.2                    Passed - Normal ALT or AST on file in past 26 months     Recent Labs   Lab Test 02/04/22  1533   ALT 19     Recent Labs   Lab Test 02/04/22  1533   AST 12             Passed - Normal platelet count on file in past 26 months     Recent Labs   Lab Test 02/04/22  1533                  Passed - Medication is active on med list        Passed - No active pregnancy on record        Passed - No positive pregnancy test in last 12 months           phentermine (ADIPEX-P) 15 MG capsule [Pharmacy Med Name: Phentermine HCl 15 MG Oral Capsule] 30 capsule 0     Sig: Take 1 capsule by mouth in the morning       There is no refill protocol information for this order          "

## 2022-10-16 NOTE — PATIENT INSTRUCTIONS
Morbid obesity with BMI of 40.0-44.9, adult (H)  Chronic, previously on phentermine and topiramate.  Had an approximate 13 pound weight loss from initiation through July 2022.  Patient has been off of medication since mid August due to not refilling.  Feels is still eating smaller amounts even being off of medication.  Has been able to maintain her weight since.  Going into a difficult time of year, patient feels she may benefit from continuing phentermine and topiramate.  We will have patient restart and follow-up with weight check in 1 month.  - phentermine (ADIPEX-P) 15 MG capsule; Take 1 capsule (15 mg) by mouth every morning  - topiramate (TOPAMAX) 25 MG tablet; Take 1 tablet (25 mg) by mouth daily for 7 days, THEN 1 tablet (25 mg) 2 times daily for 24 days.    Joint pain in fingers of both hands  Patient complains of a 2-month history of bilateral finger joint pain with mild swelling.  Denies any previous trauma or injury.  Seems to be worsening.  Will obtain x-rays in office today and notify patient of results and any further recommendations.  Did discuss the probability of osteoarthritis and treating with hand therapy and over-the-counter analgesics such as ibuprofen and/or Tylenol.  - XR Hand Bilateral G/E 3 Views; Future    External hemorrhoids  Chronic history of external hemorrhoids.  Has been using over-the-counter hydrocortisone cream without much improvement.  Will prescribe a higher potency steroid and encourage patient to do sitz bath's at home.  If worsening or not improving, follow-up in clinic.  - hydrocortisone, Perianal, (HYDROCORTISONE) 2.5 % cream; Place rectally 2 times daily as needed for hemorrhoids

## 2022-11-20 ENCOUNTER — HEALTH MAINTENANCE LETTER (OUTPATIENT)
Age: 42
End: 2022-11-20

## 2023-02-27 ENCOUNTER — OFFICE VISIT (OUTPATIENT)
Dept: FAMILY MEDICINE | Facility: CLINIC | Age: 43
End: 2023-02-27
Payer: COMMERCIAL

## 2023-02-27 VITALS
OXYGEN SATURATION: 96 % | WEIGHT: 221 LBS | TEMPERATURE: 99.3 F | HEART RATE: 74 BPM | SYSTOLIC BLOOD PRESSURE: 118 MMHG | RESPIRATION RATE: 16 BRPM | HEIGHT: 60 IN | DIASTOLIC BLOOD PRESSURE: 76 MMHG | BODY MASS INDEX: 43.39 KG/M2

## 2023-02-27 DIAGNOSIS — E66.01 CLASS 3 SEVERE OBESITY WITHOUT SERIOUS COMORBIDITY WITH BODY MASS INDEX (BMI) OF 40.0 TO 44.9 IN ADULT, UNSPECIFIED OBESITY TYPE (H): ICD-10-CM

## 2023-02-27 DIAGNOSIS — E66.813 CLASS 3 SEVERE OBESITY WITHOUT SERIOUS COMORBIDITY WITH BODY MASS INDEX (BMI) OF 40.0 TO 44.9 IN ADULT, UNSPECIFIED OBESITY TYPE (H): ICD-10-CM

## 2023-02-27 DIAGNOSIS — G44.52 NEW PERSISTENT DAILY HEADACHE: Primary | ICD-10-CM

## 2023-02-27 PROCEDURE — 99214 OFFICE O/P EST MOD 30 MIN: CPT | Mod: 25 | Performed by: STUDENT IN AN ORGANIZED HEALTH CARE EDUCATION/TRAINING PROGRAM

## 2023-02-27 PROCEDURE — 96372 THER/PROPH/DIAG INJ SC/IM: CPT | Performed by: STUDENT IN AN ORGANIZED HEALTH CARE EDUCATION/TRAINING PROGRAM

## 2023-02-27 RX ORDER — KETOROLAC TROMETHAMINE 30 MG/ML
30 INJECTION, SOLUTION INTRAMUSCULAR; INTRAVENOUS ONCE
Status: COMPLETED | OUTPATIENT
Start: 2023-02-27 | End: 2023-02-27

## 2023-02-27 RX ORDER — SUMATRIPTAN 25 MG/1
25-50 TABLET, FILM COATED ORAL
Qty: 12 TABLET | Refills: 0 | Status: SHIPPED | OUTPATIENT
Start: 2023-02-27

## 2023-02-27 RX ADMIN — KETOROLAC TROMETHAMINE 30 MG: 30 INJECTION, SOLUTION INTRAMUSCULAR; INTRAVENOUS at 14:59

## 2023-02-27 ASSESSMENT — ANXIETY QUESTIONNAIRES
GAD7 TOTAL SCORE: 4
GAD7 TOTAL SCORE: 4
3. WORRYING TOO MUCH ABOUT DIFFERENT THINGS: SEVERAL DAYS
4. TROUBLE RELAXING: NOT AT ALL
2. NOT BEING ABLE TO STOP OR CONTROL WORRYING: SEVERAL DAYS
7. FEELING AFRAID AS IF SOMETHING AWFUL MIGHT HAPPEN: NOT AT ALL
6. BECOMING EASILY ANNOYED OR IRRITABLE: SEVERAL DAYS
7. FEELING AFRAID AS IF SOMETHING AWFUL MIGHT HAPPEN: NOT AT ALL
5. BEING SO RESTLESS THAT IT IS HARD TO SIT STILL: NOT AT ALL
1. FEELING NERVOUS, ANXIOUS, OR ON EDGE: SEVERAL DAYS
8. IF YOU CHECKED OFF ANY PROBLEMS, HOW DIFFICULT HAVE THESE MADE IT FOR YOU TO DO YOUR WORK, TAKE CARE OF THINGS AT HOME, OR GET ALONG WITH OTHER PEOPLE?: NOT DIFFICULT AT ALL
IF YOU CHECKED OFF ANY PROBLEMS ON THIS QUESTIONNAIRE, HOW DIFFICULT HAVE THESE PROBLEMS MADE IT FOR YOU TO DO YOUR WORK, TAKE CARE OF THINGS AT HOME, OR GET ALONG WITH OTHER PEOPLE: NOT DIFFICULT AT ALL

## 2023-02-27 ASSESSMENT — PAIN SCALES - GENERAL: PAINLEVEL: MODERATE PAIN (5)

## 2023-02-27 NOTE — PATIENT INSTRUCTIONS
Patient Education   Here is the plan from today's visit    1. New persistent daily headache  - MR Brain w/o & w Contrast; Future  - SUMAtriptan (IMITREX) 25 MG tablet; Take 1-2 tablets (25-50 mg) by mouth at onset of headache for migraine May repeat in 2 hours. Max 8 tablets/24 hours.  Dispense: 12 tablet; Refill: 0    If this doesn't work:  Step 1:   - try 1 of the sumatriptan, can repeat the dose if needed  Step 2:   - take 1-2 benadryl  - 2-3 iburofen (400-600 mg)  - 1-2 sumatriptan    Please call or return to clinic if your symptoms don't go away.    Follow up plan  Return if symptoms worsen or fail to improve.    Thank you for coming to the [facility] today.  Lab Testing:  **If you had lab testing today and your results are reassuring or normal they will be mailed to you or sent through [employer] within 7 days.   **If the lab tests need quick action we will call you with the results.  **If you are having labs done on a different day, please call [phone] to schedule at the [facility] or [phone] for other [facility] locations. Labs do not offer walk-in appointments.  The phone number we will call with results is # [phone] (home) . If this is not the best number please call our clinic and change the number.  Medication Refills:  If you need any refills please call your pharmacy and they will contact us.   If you need to  your refill at a new pharmacy, please contact the new pharmacy directly. The new pharmacy will help you get your medications transferred faster.   Scheduling:  If you have any concerns about today's visit or wish to schedule another appointment please call our office during normal business hours [phone]   If a referral was made to an [facility] specialty provider and you do not get a call from central scheduling, please refer to directions on your visit summary or call our office during normal business hours for assistance.   If a  Mammogram was ordered for you at the [facility] call [phone] to schedule or change your appointment.  If you had an XRay/CT/Ultrasound/MRI ordered the number is [phone] to schedule or change your radiology appointment.   If you had an Echo/Heart Monitor/Cardiac Cath or other cardiac testing ordered the number is [phone] to schedule or change your appointment.   Medical Concerns:  If you have urgent medical concerns please call [phone] at any time of the day.    Erika Burns MD

## 2023-02-27 NOTE — PROGRESS NOTES
Assessment & Plan     New persistent daily headache  Patient is a pleasant 42-year-old female who presents today for new onset headache/migraine.  Patient had migraine headache starting on 2/19/2023, initially behind the left eye.  Currently, symptoms are more on the right side and behind the right eye.  She has not had any sensitivity to light or sound.  She does not have any neck muscle tension.  She did try chiropractics a few days ago, which did not improve her headache.  She has been trying Tylenol headache or Excedrin, which dulled the pain but do not resolve it.  She has had some change in sensation in the left lower extremity, feeling like her muscles are more cramped on that side, as well as some pain with ambulation of the toes of the left foot.  This is new starting around the time of her headache.  Exam today is largely reassuring.  Patient is going to work, and so we are limited in what medications we are able to use here in clinic.  We did try a Toradol injection today.  She is given a prescription for sumatriptan hand to try as well for migraine management.  Given that she is now greater than 40 years old, has a new migraine that she has not had before, MR of the brain is ordered today for evaluation of other insidious causes of her headache.  - MR Brain w/o & w Contrast  - SUMAtriptan (IMITREX) 25 MG tablet  Dispense: 12 tablet; Refill: 0  - ketorolac (TORADOL) injection 30 mg    Class 3 severe obesity without serious comorbidity with body mass index (BMI) of 40.0 to 44.9 in adult, unspecified obesity type (H)  HCC coding.  BMI:   Estimated body mass index is 43.16 kg/m  as calculated from the following:    Height as of this encounter: 1.524 m (5').    Weight as of this encounter: 100.2 kg (221 lb).   Did not discuss today in detail       Return if symptoms worsen or fail to improve.    Erika Burns MD  [facility]    Chino Cortes is a 42 year old, presenting for  "the following health issues:  Chief Complaint   Patient presents with     Headache     X 1 week     History of Present Illness       Headaches:   Since the patient's last clinic visit, headaches are: no change  The patient is getting headaches:  Usually rare.had this one for week now  She is able to do normal daily activities when she has a migraine.  The patient is taking the following rescue/relief medications:  Tylenol and Excedrin   Patient states \"I get some relief\" from the rescue/relief medications.   The patient is taking the following medications to prevent migraines:  No medications to prevent migraines  In the past 4 weeks, the patient has gone to an Urgent Care or Emergency Room 0 times times due to headaches.    She eats 0-1 servings of fruits and vegetables daily.She consumes 3 sweetened beverage(s) daily.She exercises with enough effort to increase her heart rate 9 or less minutes per day.  She exercises with enough effort to increase her heart rate 3 or less days per week.   She is taking medications regularly.  Today's FIDENCIO-7 Score: 4     About 1 week ago. 2/19/23 had a migraine. Behind left eye.   Regular tylenol doesn't work, took some tylenol headache that reduced it. Could still feel it.   Kind of been nagging since then.   Tylenol headache or excedrin headache. Still just dulling it and not taking it away  Not usually one for headaches/migraines.     Headache right now, more around right eye. Generally across the whole forehead except that first time.   No vision changes.   Sensitivity to light and sound - no.   No nausea or vomiting.   No nasal congestion.   No recent illness  Throat a little irritated. But otherwise no cough, chest pain, sob, abd pain, nausea/vom, diarrhea.  Numbness/weakness/tingling?  Couple little toes on left foot hurt, but no numbness/weakness/tingling.'left leg always feels like it is cramped, needs stretching. Relatively new since the headache.   Birth control for a " while.     No tobacco  alccohol rarely  No other drugs.       Review of Systems   Constitutional, HEENT, cardiovascular, pulmonary, GI, , musculoskeletal, neuro, skin, endocrine and psych systems are negative, except as otherwise noted.      Objective    /76 (BP Location: Right arm, Patient Position: Sitting, Cuff Size: Adult Regular)   Pulse 74   Temp 99.3  F (37.4  C) (Tympanic)   Resp 16   Ht 1.524 m (5')   Wt 100.2 kg (221 lb)   LMP 02/23/2023   SpO2 96%   BMI 43.16 kg/m    Body mass index is 43.16 kg/m .  Physical Exam  Constitutional:       Appearance: Normal appearance.   HENT:      Head: Normocephalic.   Eyes:      General: No scleral icterus.     Extraocular Movements: Extraocular movements intact.      Conjunctiva/sclera: Conjunctivae normal.      Pupils: Pupils are equal, round, and reactive to light.      Funduscopic exam:     Right eye: No papilledema.         Left eye: No papilledema.   Neck:      Comments: Negative for occipital tenderness  Cardiovascular:      Rate and Rhythm: Normal rate and regular rhythm.      Heart sounds: Normal heart sounds.   Pulmonary:      Effort: Pulmonary effort is normal.      Breath sounds: Normal breath sounds. No wheezing, rhonchi or rales.   Musculoskeletal:         General: Normal range of motion.      Cervical back: Normal range of motion. No rigidity.   Neurological:      General: No focal deficit present.      Mental Status: She is alert and oriented to person, place, and time.

## 2023-03-01 ENCOUNTER — HOSPITAL ENCOUNTER (OUTPATIENT)
Dept: MRI IMAGING | Facility: CLINIC | Age: 43
Discharge: HOME | End: 2023-03-01
Attending: STUDENT IN AN ORGANIZED HEALTH CARE EDUCATION/TRAINING PROGRAM | Admitting: STUDENT IN AN ORGANIZED HEALTH CARE EDUCATION/TRAINING PROGRAM
Payer: COMMERCIAL

## 2023-03-01 DIAGNOSIS — G44.52 NEW PERSISTENT DAILY HEADACHE: ICD-10-CM

## 2023-03-01 PROCEDURE — 70553 MRI BRAIN STEM W/O & W/DYE: CPT

## 2023-03-01 PROCEDURE — A9585 GADOBUTROL INJECTION: HCPCS | Performed by: STUDENT IN AN ORGANIZED HEALTH CARE EDUCATION/TRAINING PROGRAM

## 2023-03-01 PROCEDURE — 255N000002 HC RX 255 OP 636: Performed by: STUDENT IN AN ORGANIZED HEALTH CARE EDUCATION/TRAINING PROGRAM

## 2023-03-01 RX ORDER — GADOBUTROL 604.72 MG/ML
10 INJECTION INTRAVENOUS ONCE
Status: COMPLETED | OUTPATIENT
Start: 2023-03-01 | End: 2023-03-01

## 2023-03-01 RX ADMIN — GADOBUTROL 10 ML: 604.72 INJECTION INTRAVENOUS at 18:12

## 2023-03-28 ENCOUNTER — VIRTUAL VISIT (OUTPATIENT)
Dept: FAMILY MEDICINE | Facility: CLINIC | Age: 43
End: 2023-03-28
Payer: COMMERCIAL

## 2023-03-28 DIAGNOSIS — E66.01 MORBID OBESITY WITH BMI OF 40.0-44.9, ADULT (H): Primary | ICD-10-CM

## 2023-03-28 PROCEDURE — 99213 OFFICE O/P EST LOW 20 MIN: CPT | Mod: VID | Performed by: NURSE PRACTITIONER

## 2023-03-28 RX ORDER — PHENTERMINE HYDROCHLORIDE 15 MG/1
15 CAPSULE ORAL EVERY MORNING
Qty: 30 CAPSULE | Refills: 0 | Status: SHIPPED | OUTPATIENT
Start: 2023-03-28 | End: 2023-09-19

## 2023-03-28 RX ORDER — TOPIRAMATE 25 MG/1
TABLET, FILM COATED ORAL
Qty: 55 TABLET | Refills: 0 | Status: SHIPPED | OUTPATIENT
Start: 2023-03-28 | End: 2023-09-19

## 2023-03-28 NOTE — PROGRESS NOTES
Sebastian is a 42 year old who is being evaluated via a billable video visit.      How would you like to obtain your AVS? [employer]  If the video visit is dropped, the invitation should be resent by: Will log into [employer]  Will anyone else be joining your video visit? No          Assessment & Plan     Morbid obesity with BMI of 40.0-44.9, adult (H)  Chronic, has been stable.  Patient has maintained previous weight loss fairly well.  Patient noting some increase in appetite as of recent.  Still wants to be able to be healthier and lose more weight.  BMI is 43.16 as of February 2023.  Tolerated phentermine and topiramate in the past.  We will restart at previous dosages.  Discussed and encouraged to set goals and start developing good diet and exercise habits.  Patient to follow-up in person in 1 month for recheck.  - phentermine (ADIPEX-P) 15 MG capsule; Take 1 capsule (15 mg) by mouth every morning  - topiramate (TOPAMAX) 25 MG tablet; Take 1 tablet (25 mg) by mouth daily for 7 days, THEN 1 tablet (25 mg) 2 times daily for 24 days.             BMI:   Estimated body mass index is 43.16 kg/m  as calculated from the following:    Height as of 2/27/23: 1.524 m (5').    Weight as of 2/27/23: 100.2 kg (221 lb).   Weight management plan: Discussed healthy diet and exercise guidelines Restarted on pharmacotherapy for weight loss    See Patient Instructions    Purnima Hill, KENYA, APRN-CNP   [facility]    Subjective   Sebastian is a 42 year old, presenting for the following health issues:  Weight Problem (Would like to restart medication)  No flowsheet data found.  History of Present Illness       Reason for visit:  Weight loss medication    She eats 2-3 servings of fruits and vegetables daily.She consumes 2 sweetened beverage(s) daily.She exercises with enough effort to increase her heart rate 30 to 60 minutes per day.  She exercises with enough effort to increase her heart rate 3 or less days per week.    She is taking medications regularly.      Would like to restart medication for weight loss  Pt was on Topiramate and Phentermine - tolerated well previously    Current home weight 221lbs  Wt Readings from Last 4 Encounters:   02/27/23 100.2 kg (221 lb)   10/03/22 101.2 kg (223 lb)   07/18/22 101.9 kg (224 lb 9.6 oz)   06/07/22 103.3 kg (227 lb 11.2 oz)     Feels like eating a little more  Did  a second job so doesn't eat all night.           Review of Systems   Constitutional, HEENT, cardiovascular, pulmonary, gi and gu systems are negative, except as otherwise noted.      Objective    Vitals - Patient Reported  Weight (Patient Reported): 100.2 kg (221 lb)      Vitals:  No vitals were obtained today due to virtual visit.    Physical Exam   GENERAL: Healthy, alert and no distress  EYES: Eyes grossly normal to inspection.  No discharge or erythema, or obvious scleral/conjunctival abnormalities.  RESP: No audible wheeze, cough, or visible cyanosis.  No visible retractions or increased work of breathing.    SKIN: Visible skin clear. No significant rash, abnormal pigmentation or lesions.  NEURO: Cranial nerves grossly intact.  Mentation and speech appropriate for age.  PSYCH: Mentation appears normal, affect normal/bright, judgement and insight intact, normal speech and appearance well-groomed.    Diagnostic Test Results:  none            Video-Visit Details    Type of service:  Video Visit     Originating Location (pt. Location): Home    Distant Location (provider location):  Off-site  Platform used for Video Visit: [employer]    Chart documentation with Dragon Voice recognition Software. Although reviewed after completion, some words and grammatical errors may remain.

## 2023-03-28 NOTE — PATIENT INSTRUCTIONS
Morbid obesity with BMI of 40.0-44.9, adult (H)  Chronic, has been stable.  Patient has maintained previous weight loss fairly well.  Patient noting some increase in appetite as of recent.  Still wants to be able to be healthier and lose more weight.  BMI is 43.16 as of February 2023.  Tolerated phentermine and topiramate in the past.  We will restart at previous dosages.  Discussed and encouraged to set goals and start developing good diet and exercise habits.  Patient to follow-up in person in 1 month for recheck.  - phentermine (ADIPEX-P) 15 MG capsule; Take 1 capsule (15 mg) by mouth every morning  - topiramate (TOPAMAX) 25 MG tablet; Take 1 tablet (25 mg) by mouth daily for 7 days, THEN 1 tablet (25 mg) 2 times daily for 24 days.

## 2023-04-15 ENCOUNTER — HEALTH MAINTENANCE LETTER (OUTPATIENT)
Age: 43
End: 2023-04-15

## 2023-05-01 ENCOUNTER — MYC MEDICAL ADVICE (OUTPATIENT)
Dept: FAMILY MEDICINE | Facility: CLINIC | Age: 43
End: 2023-05-01
Payer: COMMERCIAL

## 2023-09-19 ENCOUNTER — VIRTUAL VISIT (OUTPATIENT)
Dept: FAMILY MEDICINE | Facility: CLINIC | Age: 43
End: 2023-09-19
Payer: COMMERCIAL

## 2023-09-19 DIAGNOSIS — E66.01 MORBID OBESITY WITH BMI OF 40.0-44.9, ADULT (H): ICD-10-CM

## 2023-09-19 PROCEDURE — 99213 OFFICE O/P EST LOW 20 MIN: CPT | Mod: VID | Performed by: NURSE PRACTITIONER

## 2023-09-19 RX ORDER — PHENTERMINE HYDROCHLORIDE 15 MG/1
15 CAPSULE ORAL EVERY MORNING
Qty: 30 CAPSULE | Refills: 1 | Status: SHIPPED | OUTPATIENT
Start: 2023-09-19 | End: 2023-10-25

## 2023-09-19 RX ORDER — TOPIRAMATE 25 MG/1
25 TABLET, FILM COATED ORAL DAILY
Qty: 30 TABLET | Refills: 0 | Status: SHIPPED | OUTPATIENT
Start: 2023-09-19 | End: 2023-10-25

## 2023-09-19 NOTE — PROGRESS NOTES
Sebastian is a 42 year old who is being evaluated via a billable video visit.      How would you like to obtain your AVS? MyChart  If the video visit is dropped, the invitation should be resent by: Text to cell phone: [phone]  Will anyone else be joining your video visit? No          Assessment & Plan     Morbid obesity with BMI of 40.0-44.9, adult (H)  Chronic, previously on phentermine and topiramate.  Had an approximate 17 pound weight loss; now 210 pounds at home.  Has been after few months.  Tolerated phentermine well.  Did have some tingling with the topiramate.  Discussed restarting medications and keeping topiramate at the lower dose.  Patient like to proceed.  Encourage patient to continue daily physical exercise as well as a healthy, well-balanced diet.  We will have patient follow-up in 1 month via virtual visit with an updated weight.  - phentermine (ADIPEX-P) 15 MG capsule; Take 1 capsule (15 mg) by mouth every morning  - topiramate (TOPAMAX) 25 MG tablet; Take 1 tablet (25 mg) by mouth daily             BMI:   Estimated body mass index is 43.16 kg/m  as calculated from the following:    Height as of 2/27/23: 1.524 m (5').    Weight as of 2/27/23: 100.2 kg (221 lb).   Weight management plan: Discussed healthy diet and exercise guidelines AND medication management     See Patient Instructions    Purnima Hill, KENYA, APRN-CNP   [facility]    Subjective   Sebastian is a 42 year old, presenting for the following health issues:  Weight Problem      HPI     Concern - weight concern  Patient has been off her weight loss medication for about 4 months  Patient would like to have the medication orders placed.   Patient reports last weight was 210# (home scale).    Wt Readings from Last 4 Encounters:   02/27/23 100.2 kg (221 lb)   10/03/22 101.2 kg (223 lb)   07/18/22 101.9 kg (224 lb 9.6 oz)   06/07/22 103.3 kg (227 lb 11.2 oz)      The Topiramate was giving her tingly fingers so stopped  taking - did get up to two times daily         Review of Systems   Constitutional, HEENT, cardiovascular, pulmonary, gi and gu systems are negative, except as otherwise noted.      Objective    Vitals - Patient Reported  Weight (Patient Reported): 95.3 kg (210 lb)      Vitals:  No vitals were obtained today due to virtual visit.    Physical Exam   GENERAL: Healthy, alert and no distress  EYES: Eyes grossly normal to inspection.  No discharge or erythema, or obvious scleral/conjunctival abnormalities.  RESP: No audible wheeze, cough, or visible cyanosis.  No visible retractions or increased work of breathing.    SKIN: Visible skin clear. No significant rash, abnormal pigmentation or lesions.  NEURO: Cranial nerves grossly intact.  Mentation and speech appropriate for age.  PSYCH: Mentation appears normal, affect normal/bright, judgement and insight intact, normal speech and appearance well-groomed.    Diagnostic Test Results:  Labs reviewed in Epic  none            Video-Visit Details    Type of service:  Video Visit     Originating Location (pt. Location): Home    Distant Location (provider location):  Off-site  Platform used for Video Visit: [email or web address]    Chart documentation with Dragon Voice recognition Software. Although reviewed after completion, some words and grammatical errors may remain.

## 2023-09-19 NOTE — PATIENT INSTRUCTIONS
Morbid obesity with BMI of 40.0-44.9, adult (H)  Chronic, previously on phentermine and topiramate.  Had an approximate 17 pound weight loss; now 210 pounds at home.  Has been after few months.  Tolerated phentermine well.  Did have some tingling with the topiramate.  Discussed restarting medications and keeping topiramate at the lower dose.  Patient like to proceed.  Encourage patient to continue daily physical exercise as well as a healthy, well-balanced diet.  We will have patient follow-up in 1 month via virtual visit with an updated weight.  - phentermine (ADIPEX-P) 15 MG capsule; Take 1 capsule (15 mg) by mouth every morning  - topiramate (TOPAMAX) 25 MG tablet; Take 1 tablet (25 mg) by mouth daily

## 2023-10-25 ENCOUNTER — VIRTUAL VISIT (OUTPATIENT)
Dept: FAMILY MEDICINE | Facility: CLINIC | Age: 43
End: 2023-10-25
Attending: NURSE PRACTITIONER
Payer: COMMERCIAL

## 2023-10-25 DIAGNOSIS — E66.01 MORBID OBESITY WITH BMI OF 40.0-44.9, ADULT (H): ICD-10-CM

## 2023-10-25 PROCEDURE — 99213 OFFICE O/P EST LOW 20 MIN: CPT | Mod: VID | Performed by: NURSE PRACTITIONER

## 2023-10-25 RX ORDER — PHENTERMINE HYDROCHLORIDE 37.5 MG/1
37.5 CAPSULE ORAL EVERY MORNING
Qty: 90 CAPSULE | Refills: 0 | Status: SHIPPED | OUTPATIENT
Start: 2023-10-25 | End: 2024-03-05 | Stop reason: SINTOL

## 2023-10-25 RX ORDER — TOPIRAMATE 25 MG/1
25 TABLET, FILM COATED ORAL DAILY
Qty: 90 TABLET | Refills: 0 | Status: SHIPPED | OUTPATIENT
Start: 2023-10-25 | End: 2024-01-31

## 2023-10-25 NOTE — PATIENT INSTRUCTIONS
Morbid obesity with BMI of 40.0-44.9, adult (H)  Chronic, slightly improving since starting topiramate and phentermine back up.  Had good results on previously.  Noticing not much of an appetite curve at the 15 mg dose.  Down 2 pounds since start.  Tolerating well without any side effects.  Discussed increasing dose of phentermine to 37.5 and keeping topiramate dose same, patient is agreeable.  We will have patient continue these doses and follow-up via virtual visit in 3 months or sooner if needed.  - topiramate (TOPAMAX) 25 MG tablet; Take 1 tablet (25 mg) by mouth daily  - phentermine (ADIPEX-P) 37.5 MG capsule; Take 1 capsule (37.5 mg) by mouth every morning

## 2023-10-25 NOTE — PROGRESS NOTES
Sebastian is a 42 year old who is being evaluated via a billable video visit.      How would you like to obtain your AVS? MyChart  If the video visit is dropped, the invitation should be resent by: Text to cell phone: [phone]  Will anyone else be joining your video visit? No          Assessment & Plan     Morbid obesity with BMI of 40.0-44.9, adult (H)  Chronic, slightly improving since starting topiramate and phentermine back up.  Had good results on previously.  Noticing not much of an appetite curve at the 15 mg dose.  Down 2 pounds since start.  Tolerating well without any side effects.  Discussed increasing dose of phentermine to 37.5 and keeping topiramate dose same, patient is agreeable.  We will have patient continue these doses and follow-up via virtual visit in 3 months or sooner if needed.  - topiramate (TOPAMAX) 25 MG tablet; Take 1 tablet (25 mg) by mouth daily  - phentermine (ADIPEX-P) 37.5 MG capsule; Take 1 capsule (37.5 mg) by mouth every morning             See Patient Instructions    Purnima Hill, DNP, APRN-CNP   [facility]    Subjective   Sebastian is a 42 year old, presenting for the following health issues:  Weight Check        10/25/2023     3:26 PM   Additional Questions   Roomed by jac JACKSON     Medication Followup of  weight check   Taking Medication as prescribed: yes  Side Effects:  None  Medication Helping Symptoms:  yes    Was 210 lbs at home when started medications  Down to 208 ~ doesn't feel she's getting as full    Not getting any tingling, dry mouth every now and then     Hasn't started any exercise routine, choosing healthy options       Review of Systems   Constitutional, HEENT, cardiovascular, pulmonary, gi and gu systems are negative, except as otherwise noted.      Objective           Vitals:  No vitals were obtained today due to virtual visit.    Physical Exam   GENERAL: Healthy, alert and no distress  EYES: Eyes grossly normal to  inspection.  No discharge or erythema, or obvious scleral/conjunctival abnormalities.  RESP: No audible wheeze, cough, or visible cyanosis.  No visible retractions or increased work of breathing.    SKIN: Visible skin clear. No significant rash, abnormal pigmentation or lesions.  NEURO: Cranial nerves grossly intact.  Mentation and speech appropriate for age.  PSYCH: Mentation appears normal, affect normal/bright, judgement and insight intact, normal speech and appearance well-groomed.    Diagnostic Test Results:  Labs reviewed in Epic  none            Video-Visit Details    Type of service:  Video Visit     Originating Location (pt. Location): Home    Distant Location (provider location):  On-site  Platform used for Video Visit: YingYang    Chart documentation with Dragon Voice recognition Software. Although reviewed after completion, some words and grammatical errors may remain.

## 2023-12-18 DIAGNOSIS — N92.1 METRORRHAGIA: ICD-10-CM

## 2023-12-18 RX ORDER — DESOGESTREL AND ETHINYL ESTRADIOL 0.15-0.03
KIT ORAL
Qty: 84 TABLET | Refills: 2 | Status: SHIPPED | OUTPATIENT
Start: 2023-12-18 | End: 2024-01-31

## 2023-12-18 NOTE — TELEPHONE ENCOUNTER
"  Requested Prescriptions   Pending Prescriptions Disp Refills    ENSKYCE 0.15-30 MG-MCG tablet [Pharmacy Med Name: Enskyce 0.15-30 MG-MCG Oral Tablet] 84 tablet 2     Sig: Take 1 tablet by mouth once daily       Contraceptives Protocol Passed - 12/18/2023  8:11 AM        Passed - Patient is not a current smoker if age is 35 or older        Passed - Recent (12 mo) or future (30 days) visit within the authorizing provider's specialty     Patient has had an office visit with the authorizing provider or a provider within the authorizing providers department within the previous 12 mos or has a future within next 30 days. See \"Patient Info\" tab in inbasket, or \"Choose Columns\" in Meds & Orders section of the refill encounter.              Passed - Medication is active on med list        Passed - No active pregnancy on record        Passed - No positive pregnancy test in past 12 months                 Melvin Sinha RN 12/18/23 3:00 PM   "

## 2024-01-31 ENCOUNTER — OFFICE VISIT (OUTPATIENT)
Dept: FAMILY MEDICINE | Facility: CLINIC | Age: 44
End: 2024-01-31
Payer: COMMERCIAL

## 2024-01-31 VITALS
SYSTOLIC BLOOD PRESSURE: 126 MMHG | OXYGEN SATURATION: 97 % | TEMPERATURE: 99.5 F | DIASTOLIC BLOOD PRESSURE: 86 MMHG | BODY MASS INDEX: 41.98 KG/M2 | HEART RATE: 96 BPM | HEIGHT: 60 IN | WEIGHT: 213.8 LBS | RESPIRATION RATE: 24 BRPM

## 2024-01-31 DIAGNOSIS — Z00.00 ROUTINE GENERAL MEDICAL EXAMINATION AT A HEALTH CARE FACILITY: Primary | ICD-10-CM

## 2024-01-31 DIAGNOSIS — H69.93 DYSFUNCTION OF BOTH EUSTACHIAN TUBES: ICD-10-CM

## 2024-01-31 DIAGNOSIS — N92.1 METRORRHAGIA: ICD-10-CM

## 2024-01-31 DIAGNOSIS — Z11.59 NEED FOR HEPATITIS C SCREENING TEST: ICD-10-CM

## 2024-01-31 DIAGNOSIS — E66.01 MORBID OBESITY WITH BMI OF 40.0-44.9, ADULT (H): ICD-10-CM

## 2024-01-31 DIAGNOSIS — Z12.31 VISIT FOR SCREENING MAMMOGRAM: ICD-10-CM

## 2024-01-31 DIAGNOSIS — Z12.4 CERVICAL CANCER SCREENING: ICD-10-CM

## 2024-01-31 DIAGNOSIS — L29.0 RECTAL ITCHING: ICD-10-CM

## 2024-01-31 LAB
ANION GAP SERPL CALCULATED.3IONS-SCNC: 12 MMOL/L (ref 7–15)
BUN SERPL-MCNC: 9.8 MG/DL (ref 6–20)
CALCIUM SERPL-MCNC: 9.1 MG/DL (ref 8.6–10)
CHLORIDE SERPL-SCNC: 101 MMOL/L (ref 98–107)
CREAT SERPL-MCNC: 0.66 MG/DL (ref 0.51–0.95)
DEPRECATED HCO3 PLAS-SCNC: 26 MMOL/L (ref 22–29)
EGFRCR SERPLBLD CKD-EPI 2021: >90 ML/MIN/1.73M2
GLUCOSE SERPL-MCNC: 99 MG/DL (ref 70–99)
POTASSIUM SERPL-SCNC: 4 MMOL/L (ref 3.4–5.3)
SODIUM SERPL-SCNC: 139 MMOL/L (ref 135–145)

## 2024-01-31 PROCEDURE — 99396 PREV VISIT EST AGE 40-64: CPT | Mod: 25 | Performed by: NURSE PRACTITIONER

## 2024-01-31 PROCEDURE — G0145 SCR C/V CYTO,THINLAYER,RESCR: HCPCS | Performed by: NURSE PRACTITIONER

## 2024-01-31 PROCEDURE — 80048 BASIC METABOLIC PNL TOTAL CA: CPT | Performed by: NURSE PRACTITIONER

## 2024-01-31 PROCEDURE — 36415 COLL VENOUS BLD VENIPUNCTURE: CPT | Performed by: NURSE PRACTITIONER

## 2024-01-31 PROCEDURE — 99213 OFFICE O/P EST LOW 20 MIN: CPT | Mod: 25 | Performed by: NURSE PRACTITIONER

## 2024-01-31 PROCEDURE — 90471 IMMUNIZATION ADMIN: CPT | Performed by: NURSE PRACTITIONER

## 2024-01-31 PROCEDURE — 86803 HEPATITIS C AB TEST: CPT | Performed by: NURSE PRACTITIONER

## 2024-01-31 PROCEDURE — 90715 TDAP VACCINE 7 YRS/> IM: CPT | Performed by: NURSE PRACTITIONER

## 2024-01-31 PROCEDURE — 87624 HPV HI-RISK TYP POOLED RSLT: CPT | Performed by: NURSE PRACTITIONER

## 2024-01-31 RX ORDER — TRIAMCINOLONE ACETONIDE 5 MG/G
OINTMENT TOPICAL
Qty: 30 G | Refills: 1 | Status: SHIPPED | OUTPATIENT
Start: 2024-01-31

## 2024-01-31 RX ORDER — HYDROCORTISONE 2.5 %
CREAM (GRAM) TOPICAL
Status: CANCELLED | OUTPATIENT
Start: 2024-01-31

## 2024-01-31 RX ORDER — DESOGESTREL AND ETHINYL ESTRADIOL 0.15-0.03
1 KIT ORAL DAILY
Qty: 84 TABLET | Refills: 3 | Status: SHIPPED | OUTPATIENT
Start: 2024-01-31

## 2024-01-31 SDOH — HEALTH STABILITY: PHYSICAL HEALTH: ON AVERAGE, HOW MANY MINUTES DO YOU ENGAGE IN EXERCISE AT THIS LEVEL?: 20 MIN

## 2024-01-31 SDOH — HEALTH STABILITY: PHYSICAL HEALTH: ON AVERAGE, HOW MANY DAYS PER WEEK DO YOU ENGAGE IN MODERATE TO STRENUOUS EXERCISE (LIKE A BRISK WALK)?: 2 DAYS

## 2024-01-31 ASSESSMENT — PAIN SCALES - GENERAL: PAINLEVEL: MODERATE PAIN (5)

## 2024-01-31 ASSESSMENT — SOCIAL DETERMINANTS OF HEALTH (SDOH): HOW OFTEN DO YOU GET TOGETHER WITH FRIENDS OR RELATIVES?: ONCE A WEEK

## 2024-01-31 NOTE — PROGRESS NOTES
Preventive Care Visit  [facility]  Purnima Biswas, DNP, APRN-CNP Family Medicine  Jan 31, 2024    Assessment & Plan     Routine general medical examination at a health care facility  Relatively healthy 43-year-old female in for annual preventative examination.  - Basic metabolic panel  (Ca, Cl, CO2, Creat, Gluc, K, Na, BUN); Future    Cervical cancer screening  Cervical cancer screening due, obtained in office.  - Pap Screen with HPV - recommended age 30 - 65 years    Morbid obesity with BMI of 40.0-44.9, adult (H)  Chronic, stopped taking phentermine and topiramate around November 2023, was having significant dry mouth.  Has been able to maintain weight since.  Advised patient symptoms may have been secondary to topiramate.  Discussed retrial of just phentermine.  Patient agreeable.  Doing well on phentermine without any side effects, patient to request refill when needed and then follow-up in 6 months.  If still having side effects and not improving, recommend patient schedule virtual visit to further discuss other options for weight loss.    Rectal itching  Ongoing, unimproved rectal itching.  No external hemorrhoids noted on exam.  Has been using low potency steroid cream without improvement as well as sitz bath's.  Stools are normal, not straining.  No blood noted.  Recommend higher potency steroid cream 2 times daily as needed for itching.  If not proving, patient to notify provider.  - triamcinolone (KENALOG) 0.5 % external ointment; Apply to rectal area two times daily as needed for itching    Metrorrhagia  Chronic, stable on current dose of oral contraceptive.  Continue without any changes.  - desogestrel-ethinyl estradiol (ENSKYCE) 0.15-30 MG-MCG tablet; Take 1 tablet by mouth daily    Dysfunction of both eustachian tubes  Fluid levels noted in bilateral ears, no infection present.  Recommend starting over-the-counter antihistamine such as Zyrtec or Zyrtec daily.    Visit for  screening mammogram  Patient due for mammogram screening, referral placed.  Patient should be receiving phone call to schedule, otherwise can call [phone].  - MA SCREENING DIGITAL BILAT - Future  (s+30); Future    Need for hepatitis C screening test  Reviewed CDC recommendations.  - Hepatitis C Screen Reflex to HCV RNA Quant and Genotype; Future      Counseling  Appropriate preventive services were discussed with this patient, including applicable screening as appropriate for fall prevention, nutrition, physical activity, Tobacco-use cessation, weight loss and cognition.  Checklist reviewing preventive services available has been given to the patient.  Reviewed patient's diet, addressing concerns and/or questions.   She is at risk for lack of exercise and has been provided with information to increase physical activity for the benefit of her well-being.     Patient has been advised of split billing requirements and indicates understanding: Yes    See Patient Instructions    Chino Cortes is a 43 year old, presenting for the following:  Physical and Rectal Problem        1/31/2024     4:01 PM   Additional Questions   Roomed by Dai        Health Care Directive  Patient does not have a Health Care Directive or Living Will: Discussed advance care planning with patient; information given to patient to review.  HPI  Hemorrhoids     Duration: 3 years. Worse past 6 months  Description:   Pain: YES  Itching: YES  Accompanying signs and symptoms:   Blood in stool: no   Changes in stool pattern: no   History (similar episodes/previous evaluation): yes  Precipitating or alleviating factors: None  Therapies tried and outcome: HCC 2.5 % ~ doesn't help for very long  Not uncomfortable to sit, mainly itching       Weight  Stopped the phentermine and Topirmate about a month after last dose increase in October due to dry mouth     Wt Readings from Last 3 Encounters:   01/31/24 97 kg (213 lb 12.8 oz)   02/27/23 100.2 kg (221  lb)   10/03/22 101.2 kg (223 lb)              1/31/2024   General Health   How would you rate your overall physical health? Good   Feel stress (tense, anxious, or unable to sleep) Very much   (!) STRESS CONCERN      1/31/2024   Nutrition   Three or more servings of calcium each day? (!) NO   Diet: Regular (no restrictions)   How many servings of fruit and vegetables per day? (!) 0-1   How many sweetened beverages each day? (!) 2         1/31/2024   Exercise   Days per week of moderate/strenous exercise 2 days   Average minutes spent exercising at this level 20 min   (!) EXERCISE CONCERN      1/31/2024   Social Factors   Frequency of gathering with friends or relatives Once a week   Worry food won't last until get money to buy more No   Food not last or not have enough money for food? No   Do you have housing?  Yes   Are you worried about losing your housing? No   Lack of transportation? No   Unable to get utilities (heat,electricity)? No         1/31/2024   Dental   Dentist two times every year? Yes         1/31/2024   TB Screening   Were you born outside of [place]?  NO         Today's PHQ-2 Score:       1/31/2024     4:04 PM   PHQ-2 ( 1999 [employer])   Q1: Little interest or pleasure in doing things 1   Q2: Feeling down, depressed or hopeless 1   PHQ-2 Score 2   Q1: Little interest or pleasure in doing things Several days   Q2: Feeling down, depressed or hopeless Several days   PHQ-2 Score 2           1/31/2024   Substance Use   Alcohol more than 3/day or more than 7/wk Not Applicable   Do you use any other substances recreationally? No     Social History     Tobacco Use    Smoking status: Never     Passive exposure: Never    Smokeless tobacco: Never   Vaping Use    Vaping Use: Never used   Substance Use Topics    Alcohol use: No     Comment: rare    Drug use: No          Annual screen by mutual decision with patient    1/31/2024   STI Screening   New sexual partner(s) since last STI/HIV test? No     History of abnormal  Pap smear: NO - age 30-65 PAP every 5 years with negative HPV co-testing recommended        Latest Ref Rng & Units 2019     4:21 PM 2019     4:15 PM 2014    12:00 AM   PAP / HPV   PAP (Historical)  NIL   NIL    HPV 16 DNA NEG^Negative  Negative     HPV 18 DNA NEG^Negative  Negative     Other HR HPV NEG^Negative  Negative       The 10-year ASCVD risk score (Richard THOMPSON, et al., 2019) is: 0.3%    Values used to calculate the score:      Age: 43 years      Sex: Female      Is Non- : No      Diabetic: No      Tobacco smoker: No      Systolic Blood Pressure: 126 mmHg      Is BP treated: No      HDL Cholesterol: 69 mg/dL      Total Cholesterol: 181 mg/dL        2024   Contraception/Family Planning   Questions about contraception or family planning No        Reviewed and updated as needed this visit by Provider   Tobacco  Allergies  Meds  Problems  Med Hx  Surg Hx  Fam Hx            Past Medical History:   Diagnosis Date    Metrorrhagia 2013     Past Surgical History:   Procedure Laterality Date    ABDOMEN SURGERY  ,    c-sections    C/SECTION, LOW TRANSVERSE  3-    , Low Transverse    C/SECTION, LOW TRANSVERSE      CHOLECYSTECTOMY  2009    COLONOSCOPY  2013    Procedure: COLONOSCOPY;  Colonoscopy  ;  Surgeon: Jorge Underwood MD;  Location: [facility]    GALLBLADDER SURGERY       OB History    Para Term  AB Living   2 2 2 0 0 1   SAB IAB Ectopic Multiple Live Births   0 0 0 0 1      # Outcome Date GA Lbr Moshe/2nd Weight Sex Delivery Anes PTL Lv   2 Term 09 39w0d  3.232 kg (7 lb 2 oz) M CS-LTranv Spinal N FIONA      Name: Jett      Apgar1: 8  Apgar5: 9   1 Term 04    F CS-LTranv        Lab work is in process  Labs reviewed in EPIC  BP Readings from Last 3 Encounters:   24 126/86   23 118/76   10/03/22 118/84    Wt Readings from Last 3 Encounters:   24 97 kg (213 lb 12.8 oz)   23  100.2 kg (221 lb)   10/03/22 101.2 kg (223 lb)                  Patient Active Problem List   Diagnosis    CARDIOVASCULAR SCREENING; LDL GOAL LESS THAN 160- elevated trilgycerides 2019    Morbid obesity with BMI of 40.0-44.9, adult (H)    PMDD (premenstrual dysphoric disorder)    HX of External hemorrhoids    Mood changes     Past Surgical History:   Procedure Laterality Date    ABDOMEN SURGERY  ,    c-sections    C/SECTION, LOW TRANSVERSE  3-    , Low Transverse    C/SECTION, LOW TRANSVERSE      CHOLECYSTECTOMY  2009    COLONOSCOPY  2013    Procedure: COLONOSCOPY;  Colonoscopy  ;  Surgeon: Jorge Underwood MD;  Location: [facility]    GALLBLADDER SURGERY         Social History     Tobacco Use    Smoking status: Never     Passive exposure: Never    Smokeless tobacco: Never   Substance Use Topics    Alcohol use: No     Comment: rare     Family History   Problem Relation Age of Onset    Cancer Maternal Grandmother     Circulatory Mother 43        carotid and leg stents         Current Outpatient Medications   Medication Sig Dispense Refill    desogestrel-ethinyl estradiol (ENSKYCE) 0.15-30 MG-MCG tablet Take 1 tablet by mouth daily 84 tablet 3    triamcinolone (KENALOG) 0.5 % external ointment Apply to rectal area two times daily as needed for itching 30 g 1    phentermine (ADIPEX-P) 37.5 MG capsule Take 1 capsule (37.5 mg) by mouth every morning (Patient not taking: Reported on 2024) 90 capsule 0    SUMAtriptan (IMITREX) 25 MG tablet Take 1-2 tablets (25-50 mg) by mouth at onset of headache for migraine May repeat in 2 hours. Max 8 tablets/24 hours. (Patient not taking: Reported on 10/25/2023) 12 tablet 0     Allergies   Allergen Reactions    Amoxicillin-Pot Clavulanate Rash     Rash     Pcn [Penicillins]      See augmentin reaction     Lavender Oil Rash     Review of Systems    Review of Systems  Constitutional, HEENT, cardiovascular, pulmonary, gi and gu systems are  negative, except as otherwise noted.     Objective    Exam  /86   Pulse 96   Temp 99.5  F (37.5  C)   Resp 24   Ht 1.524 m (5')   Wt 97 kg (213 lb 12.8 oz)   LMP 01/25/2024 (Approximate)   SpO2 97%   Breastfeeding No   BMI 41.75 kg/m     Estimated body mass index is 41.75 kg/m  as calculated from the following:    Height as of this encounter: 1.524 m (5').    Weight as of this encounter: 97 kg (213 lb 12.8 oz).  Physical Exam  GENERAL: alert and no distress  EYES: Eyes grossly normal to inspection, PERRL and conjunctivae and sclerae normal  HENT: ear canals and TM's with serous fluid bilateral, nose and mouth without ulcers or lesions  NECK: no adenopathy, no asymmetry, masses, or scars  RESP: lungs clear to auscultation - no rales, rhonchi or wheezes  BREAST: normal without masses, tenderness or nipple discharge and no palpable axillary masses or adenopathy  CV: regular rate and rhythm, normal S1 S2, no S3 or S4, no murmur, click or rub, no peripheral edema  ABDOMEN: soft, nontender, no hepatosplenomegaly, no masses and bowel sounds normal   (female): normal female external genitalia, normal urethral meatus, vaginal mucosa pink, moist, well rugated  MS: no gross musculoskeletal defects noted, no edema  SKIN: no suspicious lesions or rashes  NEURO: Normal strength and tone, mentation intact and speech normal  PSYCH: mentation appears normal, affect normal/bright  LYMPH: no cervical, supraclavicular, axillary, or inguinal adenopathy      Signed Electronically by: Purnima Biswas, DNP, APRN-CNP       Chart documentation with Dragon Voice recognition Software. Although reviewed after completion, some words and grammatical errors may remain.

## 2024-01-31 NOTE — PATIENT INSTRUCTIONS
"Morbid obesity with BMI of 40.0-44.9, adult (H)  Chronic, stopped taking phentermine and topiramate around November 2023, was having significant dry mouth.  Has been able to maintain weight since.  Advised patient symptoms may have been secondary to topiramate.  Discussed retrial of just phentermine.  Patient agreeable.  Doing well on phentermine without any side effects, patient to request refill when needed and then follow-up in 6 months.  If still having side effects and not improving, recommend patient schedule virtual visit to further discuss other options for weight loss.    Rectal itching  Ongoing, unimproved rectal itching.  No external hemorrhoids noted on exam.  Has been using low potency steroid cream without improvement as well as sitz bath's.  Stools are normal, not straining.  No blood noted.  Recommend higher potency steroid cream 2 times daily as needed for itching.  If not proving, patient to notify provider.  - triamcinolone (KENALOG) 0.5 % external ointment; Apply to rectal area two times daily as needed for itching    Metrorrhagia  Chronic, stable on current dose of oral contraceptive.  Continue without any changes.  - desogestrel-ethinyl estradiol (ENSKYCE) 0.15-30 MG-MCG tablet; Take 1 tablet by mouth daily  Dysfunction of both eustachian tubes  Fluid levels noted in bilateral ears, no infection present.  Recommend starting over-the-counter antihistamine such as Zyrtec or Zyrtec daily.  Visit for screening mammogram  Patient due for mammogram screening, referral placed.  Patient should be receiving phone call to schedule, otherwise can call [phone].  - MA SCREENING DIGITAL BILAT - Future  (s+30); Future        Learning About Being Physically Active  What is physical activity?     Being physically active means doing any kind of activity that gets your body moving.  The types of physical activity that can help you get fit and stay healthy include:  Aerobic or \"cardio\" activities. These make " "your heart beat faster and make you breathe harder, such as brisk walking, riding a bike, or running. They strengthen your heart and lungs and build up your endurance.  Strength training activities. These make your muscles work against, or \"resist,\" something. Examples include lifting weights or doing push-ups. These activities help tone and strengthen your muscles and bones.  Stretches. These let you move your joints and muscles through their full range of motion. Stretching helps you be more flexible.  Reaching a balance between these three types of physical activity is important because each one contributes to your overall fitness.  What are the benefits of being active?  Being active is one of the best things you can do for your health. It helps you to:  Feel stronger and have more energy to do all the things you like to do.  Focus better at school or work.  Feel, think, and sleep better.  Reach and stay at a healthy weight.  Lose fat and build lean muscle.  Lower your risk for serious health problems, including diabetes, heart attack, high blood pressure, and some cancers.  Keep your heart, lungs, bones, muscles, and joints strong and healthy.  How can you make being active part of your life?  Start slowly. Make it your long-term goal to get at least 30 minutes of exercise on most days of the week. Walking is a good choice. You also may want to do other activities, such as running, swimming, cycling, or playing tennis or team sports.  Pick activities that you like--ones that make your heart beat faster, your muscles stronger, and your muscles and joints more flexible. If you find more than one thing you like doing, do them all. You don't have to do the same thing every day.  Get your heart pumping every day. Any activity that makes your heart beat faster and keeps it at that rate for a while counts.  Here are some great ways to get your heart beating faster:  Go for a brisk walk, run, or hike.  Go for a swim or " "bike ride.  Take an online exercise class or dance.  Play a game of touch football, basketball, or soccer.  Play tennis, pickleball, or racquetball.  Climb stairs.  Even some household chores can be aerobic. Just do them at a faster pace. Raking or mowing the lawn, sweeping the garage, and vacuuming and cleaning your home all can help get your heart rate up.  Strengthen your muscles during the week. You don't have to lift heavy weights or grow big, bulky muscles to get stronger. Doing a few simple activities that make your muscles work against, or \"resist,\" something can help you get stronger. Aim for at least twice a week.  For example, you can:  Do push-ups or sit-ups, which use your own body weight as resistance.  Lift weights or dumbbells or use stretch bands at home or in a gym or community center.  Stretch your muscles often. Stretching will help you as you become more active. It can help you stay flexible and loosen tight muscles. It can also help improve your balance and posture and can be a great way to relax.  Be sure to stretch the muscles you'll be using when you work out. It's best to warm your muscles slightly before you stretch them. Walk or do some other light aerobic activity for a few minutes. Then start stretching.  When you stretch your muscles:  Do it slowly. Stretching is not about going fast or making sudden movements.  Don't push or bounce during a stretch.  Hold each stretch for at least 15 to 30 seconds, if you can. You should feel a stretch in the muscle, but not pain.  Breathe out as you do the stretch. Then breathe in as you hold the stretch. Don't hold your breath.  If you're worried about how more activity might affect your health, have a checkup before you start. Follow any special advice your doctor gives you for getting a smart start.  Where can you learn more?  Go to https://www.healthwise.net/patiented  Enter W332 in the search box to learn more about \"Learning About Being " "Physically Active.\"  Current as of: June 6, 2023               Content Version: 13.8    6792-7178 [employer].   Care instructions adapted under license by your healthcare professional. If you have questions about a medical condition or this instruction, always ask your healthcare professional. [employer] disclaims any warranty or liability for your use of this information.      Eating Healthy Foods: Care Instructions  With every meal, you can make healthy food choices. Try to eat a variety of fruits, vegetables, whole grains, lean proteins, and low-fat dairy products. This can help you get the right balance of nutrients, including vitamins and minerals. Small changes add up over time. You can start by adding one healthy food to your meals each day.    Try to make half your plate fruits and vegetables, one-fourth whole grains, and one-fourth lean proteins. Try including dairy with your meals.   Eat more fruits and vegetables. Try to have them with most meals and snacks.   Foods for healthy eating    Fruits    These can be fresh, frozen, canned, or dried.  Try to choose whole fruit rather than fruit juice.  Eat a variety of colors.    Vegetables    These can be fresh, frozen, canned, or dried.  Beans, peas, and lentils count too.    Whole grains    Choose whole-grain breads, cereals, and noodles.  Try brown rice.    Lean proteins    These can include lean meat, poultry, fish, and eggs.  You can also have tofu, beans, peas, lentils, nuts, and seeds.    Dairy    Try milk, yogurt, and cheese.  Choose low-fat or fat-free when you can.  If you need to, use lactose-free milk or fortified plant-based milk products, such as soy milk.    Water    Drink water when you're thirsty.  Limit sugar-sweetened drinks, including soda, fruit drinks, and sports drinks.  Where can you learn more?  Go to https://www.healthThe Muse.net/patiented  Enter T756 in the search box to learn more about \"Eating Healthy Foods: " "Care Instructions.\"  Current as of: February 28, 2023               Content Version: 13.8    4943-5920 [employer]   Care instructions adapted under license by your healthcare professional. If you have questions about a medical condition or this instruction, always ask your healthcare professional. [employer] disclaims any warranty or liability for your use of this information.      Learning About Stress  What is stress?     Stress is your body's response to a hard situation. Your body can have a physical, emotional, or mental response. Stress is a fact of life for most people, and it affects everyone differently. What causes stress for you may not be stressful for someone else.  A lot of things can cause stress. You may feel stress when you go on a job interview, take a test, or run a race. This kind of short-term stress is normal and even useful. It can help you if you need to work hard or react quickly. For example, stress can help you finish an important job on time.  Long-term stress is caused by ongoing stressful situations or events. Examples of long-term stress include long-term health problems, ongoing problems at work, or conflicts in your family. Long-term stress can harm your health.  How does stress affect your health?  When you are stressed, your body responds as though you are in danger. It makes hormones that speed up your heart, make you breathe faster, and give you a burst of energy. This is called the fight-or-flight stress response. If the stress is over quickly, your body goes back to normal and no harm is done.  But if stress happens too often or lasts too long, it can have bad effects. Long-term stress can make you more likely to get sick, and it can make symptoms of some diseases worse. If you tense up when you are stressed, you may develop neck, shoulder, or low back pain. Stress is linked to high blood pressure and heart disease.  Stress also harms your emotional " health. It can make you martinez, tense, or depressed. Your relationships may suffer, and you may not do well at work or school.  What can you do to manage stress?  You can try these things to help manage stress:   Do something active. Exercise or activity can help reduce stress. Walking is a great way to get started. Even everyday activities such as housecleaning or yard work can help.  Try yoga or reina chi. These techniques combine exercise and meditation. You may need some training at first to learn them.  Do something you enjoy. For example, listen to music or go to a movie. Practice your hobby or do volunteer work.  Meditate. This can help you relax, because you are not worrying about what happened before or what may happen in the future.  Do guided imagery. Imagine yourself in any setting that helps you feel calm. You can use online videos, books, or a teacher to guide you.  Do breathing exercises. For example:  From a standing position, bend forward from the waist with your knees slightly bent. Let your arms dangle close to the floor.  Breathe in slowly and deeply as you return to a standing position. Roll up slowly and lift your head last.  Hold your breath for just a few seconds in the standing position.  Breathe out slowly and bend forward from the waist.  Let your feelings out. Talk, laugh, cry, and express anger when you need to. Talking with supportive friends or family, a counselor, or a simone leader about your feelings is a healthy way to relieve stress. Avoid discussing your feelings with people who make you feel worse.  Write. It may help to write about things that are bothering you. This helps you find out how much stress you feel and what is causing it. When you know this, you can find better ways to cope.  What can you do to prevent stress?  You might try some of these things to help prevent stress:  Manage your time. This helps you find time to do the things you want and need to do.  Get enough  "sleep. Your body recovers from the stresses of the day while you are sleeping.  Get support. Your family, friends, and community can make a difference in how you experience stress.  Limit your news feed. Avoid or limit time on social media or news that may make you feel stressed.  Do something active. Exercise or activity can help reduce stress. Walking is a great way to get started.  Where can you learn more?  Go to https://www.[employer].net/patiented  Enter N032 in the search box to learn more about \"Learning About Stress.\"  Current as of: February 26, 2023               Content Version: 13.8    9928-6293 [employer].   Care instructions adapted under license by your healthcare professional. If you have questions about a medical condition or this instruction, always ask your healthcare professional. [employer] disclaims any warranty or liability for your use of this information.      Preventive Care Advice   This is general advice given by our system to help you stay healthy. However, your care team may have specific advice just for you. Please talk to your care team about your preventive care needs.  Nutrition  Eat 5 or more servings of fruits and vegetables each day.  Try wheat bread, brown rice and whole grain pasta (instead of white bread, rice, and pasta).  Get enough calcium and vitamin D. Check the label on foods and aim for 100% of the RDA (recommended daily allowance).  Lifestyle  Exercise at least 150 minutes each week  (30 minutes a day, 5 days a week).  Do muscle strengthening activities 2 days a week. These help control your weight and prevent disease.  No smoking.  Wear sunscreen to prevent skin cancer.  Have a dental exam and cleaning every 6 months.  Yearly exams  See your health care team every year to talk about:  Any changes in your health.  Any medicines your care team has prescribed.  Preventive care, family planning, and ways to prevent chronic diseases.  Shots " (vaccines)   HPV shots (up to age 26), if you've never had them before.  Hepatitis B shots (up to age 59), if you've never had them before.  COVID-19 shot: Get this shot when it's due.  Flu shot: Get a flu shot every year.  Tetanus shot: Get a tetanus shot every 10 years.  Pneumococcal, hepatitis A, and RSV shots: Ask your care team if you need these based on your risk.  Shingles shot (for age 50 and up)  General health tests  Diabetes screening:  Starting at age 35, Get screened for diabetes at least every 3 years.  If you are younger than age 35, ask your care team if you should be screened for diabetes.  Cholesterol test: At age 39, start having a cholesterol test every 5 years, or more often if advised.  Bone density scan (DEXA): At age 50, ask your care team if you should have this scan for osteoporosis (brittle bones).  Hepatitis C: Get tested at least once in your life.  STIs (sexually transmitted infections)  Before age 24: Ask your care team if you should be screened for STIs.  After age 24: Get screened for STIs if you're at risk. You are at risk for STIs (including HIV) if:  You are sexually active with more than one person.  You don't use condoms every time.  You or a partner was diagnosed with a sexually transmitted infection.  If you are at risk for HIV, ask about PrEP medicine to prevent HIV.  Get tested for HIV at least once in your life, whether you are at risk for HIV or not.  Cancer screening tests  Cervical cancer screening: If you have a cervix, begin getting regular cervical cancer screening tests starting at age 21.  Breast cancer scan (mammogram): If you've ever had breasts, begin having regular mammograms starting at age 40. This is a scan to check for breast cancer.  Colon cancer screening: It is important to start screening for colon cancer at age 45.  Have a colonoscopy test every 10 years (or more often if you're at risk) Or, ask your provider about stool tests like a FIT test every year  or Cologuard test every 3 years.  To learn more about your testing options, visit:   https://www.[email or web address]/744610.pdf.  For help making a decision, visit:   https://bit.ly/hd29984.  Prostate cancer screening test: If you have a prostate, ask your care team if a prostate cancer screening test (PSA) at age 55 is right for you.  Lung cancer screening: If you are a current or former smoker ages 50 to 80, ask your care team if ongoing lung cancer screenings are right for you.  For informational purposes only. Not to replace the advice of your health care provider. Copyright   2023 [facility]. All rights reserved. Clinically reviewed by the [facility]. [employer] [id number] - REV 01/24.    Eating Healthy Foods: Care Instructions  With every meal, you can make healthy food choices. Try to eat a variety of fruits, vegetables, whole grains, lean proteins, and low-fat dairy products. This can help you get the right balance of nutrients, including vitamins and minerals. Small changes add up over time. You can start by adding one healthy food to your meals each day.    Try to make half your plate fruits and vegetables, one-fourth whole grains, and one-fourth lean proteins. Try including dairy with your meals.   Eat more fruits and vegetables. Try to have them with most meals and snacks.   Foods for healthy eating    Fruits    These can be fresh, frozen, canned, or dried.  Try to choose whole fruit rather than fruit juice.  Eat a variety of colors.    Vegetables    These can be fresh, frozen, canned, or dried.  Beans, peas, and lentils count too.    Whole grains    Choose whole-grain breads, cereals, and noodles.  Try brown rice.    Lean proteins    These can include lean meat, poultry, fish, and eggs.  You can also have tofu, beans, peas, lentils, nuts, and seeds.    Dairy    Try milk, yogurt, and cheese.  Choose low-fat or fat-free when you can.  If you need to, use lactose-free milk or  "fortified plant-based milk products, such as soy milk.    Water    Drink water when you're thirsty.  Limit sugar-sweetened drinks, including soda, fruit drinks, and sports drinks.  Where can you learn more?  Go to https://www.[employer].net/patiented  Enter T756 in the search box to learn more about \"Eating Healthy Foods: Care Instructions.\"  Current as of: February 28, 2023               Content Version: 13.8    9614-9971 [employer].   Care instructions adapted under license by your healthcare professional. If you have questions about a medical condition or this instruction, always ask your healthcare professional. [employer] disclaims any warranty or liability for your use of this information.      Body Mass Index: Care Instructions  Overview     Body mass index (BMI) can help you see if your weight is raising your risk for health problems. It uses a formula to compare how much you weigh with how tall you are.  A BMI lower than 18.5 is considered underweight.  A BMI between 18.5 and 24.9 is considered healthy.  A BMI between 25 and 29.9 is considered overweight. A BMI of 30 or higher is considered obese.  If your BMI is in the normal range, it means that you have a lower risk for weight-related health problems. If your BMI is in the overweight or obese range, you may be at increased risk for weight-related health problems, such as high blood pressure, heart disease, stroke, arthritis or joint pain, and diabetes. If your BMI is in the underweight range, you may be at increased risk for health problems such as fatigue, lower protection (immunity) against illness, muscle loss, bone loss, hair loss, and hormone problems.  BMI is just one measure of your risk for weight-related health problems. You may be at higher risk for health problems if you are not active, you eat an unhealthy diet, or you drink too much alcohol or use tobacco products.  Follow-up care is a key part of your treatment " "and safety. Be sure to make and go to all appointments, and call your doctor if you are having problems. It's also a good idea to know your test results and keep a list of the medicines you take.  How can you care for yourself at home?  Practice healthy eating habits. This includes eating plenty of fruits, vegetables, whole grains, lean protein, and low-fat dairy.  If your doctor recommends it, get more exercise. Walking is a good choice. Bit by bit, increase the amount you walk every day. Try for at least 30 minutes on most days of the week.  Do not smoke. Smoking can increase your risk for health problems. If you need help quitting, talk to your doctor about stop-smoking programs and medicines. These can increase your chances of quitting for good.  Limit alcohol to 2 drinks a day for men and 1 drink a day for women. Too much alcohol can cause health problems.  If you have a BMI higher than 25  Your doctor may do other tests to check your risk for weight-related health problems. This may include measuring the distance around your waist. A waist measurement of more than 40 inches in men or 35 inches in women can increase the risk of weight-related health problems.  Talk with your doctor about steps you can take to stay healthy or improve your health. You may need to make lifestyle changes to lose weight and stay healthy, such as changing your diet and getting regular exercise.  If you have a BMI lower than 18.5  Your doctor may do other tests to check your risk for health problems.  Talk with your doctor about steps you can take to stay healthy or improve your health. You may need to make lifestyle changes to gain or maintain weight and stay healthy, such as getting more healthy foods in your diet and doing exercises to build muscle.  Where can you learn more?  Go to https://www.healthwise.net/patiented  Enter S176 in the search box to learn more about \"Body Mass Index: Care Instructions.\"  Current as of: May 14, " 2023               Content Version: 13.8    3690-0373 [employer].   Care instructions adapted under license by your healthcare professional. If you have questions about a medical condition or this instruction, always ask your healthcare professional. [employer] disclaims any warranty or liability for your use of this information.

## 2024-02-01 LAB — HCV AB SERPL QL IA: NONREACTIVE

## 2024-02-03 ENCOUNTER — HEALTH MAINTENANCE LETTER (OUTPATIENT)
Age: 44
End: 2024-02-03

## 2024-02-05 LAB
BKR LAB AP GYN ADEQUACY: NORMAL
BKR LAB AP GYN INTERPRETATION: NORMAL
BKR LAB AP HPV REFLEX: NORMAL
BKR LAB AP PREVIOUS ABNORMAL: NORMAL
PATH REPORT.COMMENTS IMP SPEC: NORMAL
PATH REPORT.COMMENTS IMP SPEC: NORMAL
PATH REPORT.RELEVANT HX SPEC: NORMAL

## 2024-02-07 LAB
HUMAN PAPILLOMA VIRUS 16 DNA: NEGATIVE
HUMAN PAPILLOMA VIRUS 18 DNA: NEGATIVE
HUMAN PAPILLOMA VIRUS FINAL DIAGNOSIS: NORMAL
HUMAN PAPILLOMA VIRUS OTHER HR: NEGATIVE

## 2024-02-19 ENCOUNTER — MYC MEDICAL ADVICE (OUTPATIENT)
Dept: FAMILY MEDICINE | Facility: CLINIC | Age: 44
End: 2024-02-19
Payer: COMMERCIAL

## 2024-02-24 ENCOUNTER — HOSPITAL ENCOUNTER (OUTPATIENT)
Dept: MAMMOGRAPHY | Facility: CLINIC | Age: 44
Discharge: HOME | End: 2024-02-24
Attending: NURSE PRACTITIONER | Admitting: NURSE PRACTITIONER
Payer: COMMERCIAL

## 2024-02-24 DIAGNOSIS — Z12.31 VISIT FOR SCREENING MAMMOGRAM: ICD-10-CM

## 2024-02-24 PROCEDURE — 77067 SCR MAMMO BI INCL CAD: CPT

## 2024-03-05 ENCOUNTER — ALLIED HEALTH/NURSE VISIT (OUTPATIENT)
Dept: FAMILY MEDICINE | Facility: CLINIC | Age: 44
End: 2024-03-05

## 2024-03-05 ENCOUNTER — VIRTUAL VISIT (OUTPATIENT)
Dept: FAMILY MEDICINE | Facility: CLINIC | Age: 44
End: 2024-03-05
Payer: COMMERCIAL

## 2024-03-05 VITALS — BODY MASS INDEX: 41.62 KG/M2 | HEIGHT: 60 IN | WEIGHT: 212 LBS

## 2024-03-05 DIAGNOSIS — E66.01 MORBID OBESITY WITH BMI OF 40.0-44.9, ADULT (H): Primary | Chronic | ICD-10-CM

## 2024-03-05 PROCEDURE — 99213 OFFICE O/P EST LOW 20 MIN: CPT | Mod: 95 | Performed by: NURSE PRACTITIONER

## 2024-03-05 PROCEDURE — 99207 PR NO CHARGE NURSE ONLY: CPT | Performed by: NURSE PRACTITIONER

## 2024-03-05 NOTE — PATIENT INSTRUCTIONS
Morbid obesity with BMI of 40.0-44.9, adult (H)  Chronic, has had previous weight loss on phentermine and topiramate.  Recently restarted, however having side effects from both medications so stopped.  Has been able to maintain weight since last weight loss, however BMI still 41.4.  He is active and eats a healthy diet.  Encourage plenty of fluids.  Discussed alternative options including injectable GLP-1 antagonist.  Patient open to trying.  Will start with that found 2.5 mg weekly for 30 days, then increase to 5 mg for 30 days and then follow-up with this provider in clinic for recheck.  Discussed side effects of medication.  Encourage patient to maintain a healthy diet and increase daily physical activity.  - tirzepatide-Weight Management (ZEPBOUND) 2.5 MG/0.5ML prefilled pen; Inject 0.5 mLs (2.5 mg) Subcutaneous every 7 days for 30 days THEN 5 mg subcutaneous every 7 days  - tirzepatide-Weight Management (ZEPBOUND) 5 MG/0.5ML prefilled pen; Inject 0.5 mLs (5 mg) Subcutaneous every 7 days for 30 days

## 2024-03-05 NOTE — PROGRESS NOTES
Sebastian is a 43 year old who is being evaluated via a billable video visit.      How would you like to obtain your AVS? MyChart  If the video visit is dropped, the invitation should be resent by: Text to cell phone: [phone]  Will anyone else be joining your video visit? No          Assessment & Plan     Morbid obesity with BMI of 40.0-44.9, adult (H)  Chronic, has had previous weight loss on phentermine and topiramate.  Recently restarted, however having side effects from both medications so stopped.  Has been able to maintain weight since last weight loss, however BMI still 41.4.  He is active and eats a healthy diet.  Encourage plenty of fluids.  Discussed alternative options including injectable GLP-1 antagonist.  Patient open to trying.  Will start with that found 2.5 mg weekly for 30 days, then increase to 5 mg for 30 days and then follow-up with this provider in clinic for recheck.  Discussed side effects of medication.  Encourage patient to maintain a healthy diet and increase daily physical activity.  - tirzepatide-Weight Management (ZEPBOUND) 2.5 MG/0.5ML prefilled pen; Inject 0.5 mLs (2.5 mg) Subcutaneous every 7 days for 30 days THEN 5 mg subcutaneous every 7 days  - tirzepatide-Weight Management (ZEPBOUND) 5 MG/0.5ML prefilled pen; Inject 0.5 mLs (5 mg) Subcutaneous every 7 days for 30 days        See Patient Instructions    Subjective   Sebastian is a 43 year old, presenting for the following health issues:  Weight Check    HPI   Medication Followup of medication for weight  Taking Medication as prescribed: NO-patient discontinued medication a couple weeks ago due to side effects (phentermine)  Side Effects:  yes  Medication Helping Symptoms:  NO     Wt Readings from Last 3 Encounters:   03/05/24 96.2 kg (212 lb)   01/31/24 97 kg (213 lb 12.8 oz)   02/27/23 100.2 kg (221 lb)        Estimated body mass index is 41.4 kg/m  as calculated from the following:    Height as of an earlier encounter on 3/5/24:  "1.524 m (5').    Weight as of an earlier encounter on 3/5/24: 96.2 kg (212 lb).    Complete \"Weight Managment Plan\" in the progress note from the Adult Preventative or Medicare smartsets, use phrase .WEIGHTPLAN, or choose an option from Weight Management Resources smartset below.  Weight management plan: Discussed healthy diet and exercise guidelines medication management      Review of Systems  Constitutional, HEENT, cardiovascular, pulmonary, gi and gu systems are negative, except as otherwise noted.      Objective           Vitals:  No vitals were obtained today due to virtual visit.    Physical Exam   GENERAL: alert and no distress  EYES: Eyes grossly normal to inspection.  No discharge or erythema, or obvious scleral/conjunctival abnormalities.  RESP: No audible wheeze, cough, or visible cyanosis.    SKIN: Visible skin clear. No significant rash, abnormal pigmentation or lesions.  NEURO: Cranial nerves grossly intact.  Mentation and speech appropriate for age.  PSYCH: Appropriate affect, tone, and pace of words    Diagnostic Test Results:  Labs reviewed in Epic  none        Video-Visit Details    Type of service:  Video Visit     Originating Location (pt. Location): Home    Distant Location (provider location):  Off-site  Platform used for Video Visit: Rosaura  Signed Electronically by: Purnima Biswas DNP, APRN-CNP       Chart documentation with Dragon Voice recognition Software. Although reviewed after completion, some words and grammatical errors may remain.   "

## 2024-03-06 ENCOUNTER — TELEPHONE (OUTPATIENT)
Dept: FAMILY MEDICINE | Facility: CLINIC | Age: 44
End: 2024-03-06
Payer: COMMERCIAL

## 2024-03-06 NOTE — TELEPHONE ENCOUNTER
Prior Authorization Specialty Medication Request    Medication/Dose: Zepbound 2.5MG/0.5ML  Diagnosis and ICD code (if different than what is on RX):    New/renewal/insurance change PA/secondary ins. PA:  Previously Tried and Failed:      Important Lab Values:   Rationale:     Insurance   Primary:   Insurance ID:      Secondary (if applicable):  Insurance ID:      Pharmacy Information (if different than what is on RX)  Name:    Phone:    Fax:

## 2024-03-18 NOTE — TELEPHONE ENCOUNTER
PA Initiation    Medication: ZEPBOUND 2.5 MG/0.5ML SC SOAJ  Insurance Company: [employer] ([facility]) - Phone [phone] Fax [phone]  Pharmacy Filling the Rx: [facility] [address] [place] [address] - [facility]  Filling Pharmacy Phone: [phone]  Filling Pharmacy Fax:    Start Date: 3/18/2024

## 2024-03-19 NOTE — TELEPHONE ENCOUNTER
PRIOR AUTHORIZATION DENIED    Medication: ZEPBOUND 2.5 MG/0.5ML SC SOAJ  Insurance Company: [employer] ([facility]) - Phone [phone] Fax [phone]  Denial Date: 3/18/2024  Denial Reason(s): Insurance states that patient does not meet coverage criteria.   Appeal Information:   Patient Notified: NO

## 2024-04-03 ENCOUNTER — TELEPHONE (OUTPATIENT)
Dept: FAMILY MEDICINE | Facility: CLINIC | Age: 44
End: 2024-04-03

## 2024-04-04 ENCOUNTER — MYC MEDICAL ADVICE (OUTPATIENT)
Dept: FAMILY MEDICINE | Facility: CLINIC | Age: 44
End: 2024-04-04
Payer: COMMERCIAL

## 2024-04-04 NOTE — TELEPHONE ENCOUNTER
Pt sends mychart that she is having problems finding her doses. She will check out of state as well.     She does ask how long can she be off the zepbound while waiting for her dose to become available before she has to go back down to a lower dose and titrate up again?        David Samuels RN

## 2024-04-04 NOTE — TELEPHONE ENCOUNTER
Please notify patient and see if she can see other pharmacies that may have this available and can send through a new prescription.    Thanks,  Purnima Biswas, DNP, APRN-CNP

## 2024-05-01 ENCOUNTER — OFFICE VISIT (OUTPATIENT)
Dept: FAMILY MEDICINE | Facility: CLINIC | Age: 44
End: 2024-05-01
Attending: NURSE PRACTITIONER
Payer: COMMERCIAL

## 2024-05-01 VITALS
HEIGHT: 60 IN | SYSTOLIC BLOOD PRESSURE: 122 MMHG | HEART RATE: 88 BPM | RESPIRATION RATE: 16 BRPM | TEMPERATURE: 98.5 F | DIASTOLIC BLOOD PRESSURE: 84 MMHG | BODY MASS INDEX: 41.03 KG/M2 | WEIGHT: 209 LBS

## 2024-05-01 DIAGNOSIS — E66.01 MORBID OBESITY WITH BMI OF 40.0-44.9, ADULT (H): Primary | Chronic | ICD-10-CM

## 2024-05-01 DIAGNOSIS — R22.9 LUMP OF SKIN: ICD-10-CM

## 2024-05-01 PROCEDURE — 99213 OFFICE O/P EST LOW 20 MIN: CPT | Performed by: NURSE PRACTITIONER

## 2024-05-01 ASSESSMENT — ANXIETY QUESTIONNAIRES
GAD7 TOTAL SCORE: 3
4. TROUBLE RELAXING: NOT AT ALL
IF YOU CHECKED OFF ANY PROBLEMS ON THIS QUESTIONNAIRE, HOW DIFFICULT HAVE THESE PROBLEMS MADE IT FOR YOU TO DO YOUR WORK, TAKE CARE OF THINGS AT HOME, OR GET ALONG WITH OTHER PEOPLE: NOT DIFFICULT AT ALL
GAD7 TOTAL SCORE: 3
2. NOT BEING ABLE TO STOP OR CONTROL WORRYING: SEVERAL DAYS
3. WORRYING TOO MUCH ABOUT DIFFERENT THINGS: SEVERAL DAYS
8. IF YOU CHECKED OFF ANY PROBLEMS, HOW DIFFICULT HAVE THESE MADE IT FOR YOU TO DO YOUR WORK, TAKE CARE OF THINGS AT HOME, OR GET ALONG WITH OTHER PEOPLE?: NOT DIFFICULT AT ALL
6. BECOMING EASILY ANNOYED OR IRRITABLE: SEVERAL DAYS
7. FEELING AFRAID AS IF SOMETHING AWFUL MIGHT HAPPEN: NOT AT ALL
7. FEELING AFRAID AS IF SOMETHING AWFUL MIGHT HAPPEN: NOT AT ALL
1. FEELING NERVOUS, ANXIOUS, OR ON EDGE: NOT AT ALL
5. BEING SO RESTLESS THAT IT IS HARD TO SIT STILL: NOT AT ALL
GAD7 TOTAL SCORE: 3

## 2024-05-01 ASSESSMENT — PAIN SCALES - GENERAL: PAINLEVEL: NO PAIN (0)

## 2024-05-01 NOTE — PROGRESS NOTES
Assessment & Plan     Morbid obesity with BMI of 40.0-44.9, adult (H)  Chronic, currently finishing last week of 5 mg Zepbound.  Tolerating well.  Does note appetite control, slow weight loss, down 3 pounds since March.  Would recommend continuing, increasing dose to next level of 7.5 mg.  New scription provided.  Patient recheck in 1 month.  - tirzepatide-Weight Management (ZEPBOUND) 7.5 MG/0.5ML prefilled pen; Inject 0.5 mLs (7.5 mg) Subcutaneous every 7 days    Lump of skin  Patient reports that soft lump of upper right thigh for proxy 1 year.  Denies any previous cyst or any other trauma to area.  No infection noted, is soft and mobile.  Likely a small lipoma.  Patient also has lump/nodule on right little toe that has been there for a few months without any pain.  No signs or symptoms of infection, would recommend continued monitoring.          See Patient Instructions After discussion with patient, patient verbalizes and agreeable to receive AVS instructions via My Chart, not printed today     Chino Cortes is a 43 year old, presenting for the following health issues:  Recheck Medication (Follow up zepbound) and Derm Problem (Bump on right pinky toe, has had for a couple months now. Right thigh bump about a year, feels like it is growing. )        5/1/2024     4:01 PM   Additional Questions   Roomed by Marline CORADO   Accompanied by self         5/1/2024     4:01 PM   Patient Reported Additional Medications   Patient reports taking the following new medications no new meds     History of Present Illness       Reason for visit:  Follow up for meds    She eats 2-3 servings of fruits and vegetables daily.She consumes 2 sweetened beverage(s) daily.She exercises with enough effort to increase her heart rate 20 to 29 minutes per day.  She exercises with enough effort to increase her heart rate 3 or less days per week.   She is taking medications regularly.     Medication Followup of Zepbound  Taking Medication as  "prescribed: yes  Side Effects:  None  Medication Helping Symptoms:  yes, discuss weight loss (feeling like slowly losing weight)    Definitely noting appetite decrease on some days     Wt Readings from Last 3 Encounters:   05/01/24 94.8 kg (209 lb)   03/05/24 96.2 kg (212 lb)   01/31/24 97 kg (213 lb 12.8 oz)      Is in last week of 5 mg dose       Derm problem   Onset/Duration: about a year    Description  Location: right thigh/ buttock area lump 0.5 cm and right little toe   Color: skin colored  Border description: skin color, raised- about the size of a cotton ball   Character: round  Itching: no  Bleeding:  No  Intensity:  mild  Progression of Symptoms:  worsening getting bigger   Accompanying signs and symptoms:   Bleeding: No  Scaling: No  Excessive sun exposure/tanning: No  Sunscreen used: No  History:           Any previous history of skin cancer: No  Any family history of melanoma: No  Previous episodes of similar lesion: No  Precipitating or alleviating factors: no   Therapies tried and outcome: none      Review of Systems  Constitutional, HEENT, cardiovascular, pulmonary, gi and gu systems are negative, except as otherwise noted.      Objective    /84   Pulse 88   Temp 98.5  F (36.9  C) (Tympanic)   Resp 16   Ht 1.519 m (4' 11.8\")   Wt 94.8 kg (209 lb)   LMP 04/18/2024 (Approximate)   BMI 41.09 kg/m    Body mass index is 41.09 kg/m .  Physical Exam   GENERAL: alert and no distress  NECK: no adenopathy, no asymmetry, masses, or scars  RESP: lungs clear to auscultation - no rales, rhonchi or wheezes  CV: regular rate and rhythm, normal S1 S2, no S3 or S4, no murmur, click or rub, no peripheral edema  ABDOMEN: soft, nontender, no hepatosplenomegaly, no masses and bowel sounds normal  MS: no gross musculoskeletal defects noted, no edema  SKIN: An approximate 0.5 cm soft, mobile subcutaneous lump on right inner upper thigh without tenderness or erythema.  Small 2 mm, firm nodule on right fifth toe " without tenderness, erythema or ecchymosis.  NEURO: Normal strength and tone, mentation intact and speech normal  PSYCH: mentation appears normal, affect normal/bright    Diagnostic Test Results:  Labs reviewed in Epic  none          Signed Electronically by: True Esquivel DNP,, JOSE MANUEL CNP      Chart documentation with Dragon Voice recognition Software. Although reviewed after completion, some words and grammatical errors may remain.

## 2024-05-10 NOTE — PATIENT INSTRUCTIONS
Morbid obesity with BMI of 40.0-44.9, adult (H)  Chronic, currently finishing last week of 5 mg Zepbound.  Tolerating well.  Does note appetite control, slow weight loss, down 3 pounds since March.  Would recommend continuing, increasing dose to next level of 7.5 mg.  New scription provided.  Patient recheck in 1 month.  - tirzepatide-Weight Management (ZEPBOUND) 7.5 MG/0.5ML prefilled pen; Inject 0.5 mLs (7.5 mg) Subcutaneous every 7 days    Lump of skin  Patient reports that soft lump of upper right thigh for proxy 1 year.  Denies any previous cyst or any other trauma to area.  No infection noted, is soft and mobile.  Likely a small lipoma.  Patient also has lump/nodule on right little toe that has been there for a few months without any pain.  No signs or symptoms of infection, would recommend continued monitoring.

## 2024-10-23 ENCOUNTER — VIRTUAL VISIT (OUTPATIENT)
Dept: FAMILY MEDICINE | Facility: CLINIC | Age: 44
End: 2024-10-23
Payer: COMMERCIAL

## 2024-10-23 DIAGNOSIS — R10.2 PELVIC PRESSURE IN FEMALE: Primary | ICD-10-CM

## 2024-10-23 PROCEDURE — 99213 OFFICE O/P EST LOW 20 MIN: CPT | Mod: 95 | Performed by: NURSE PRACTITIONER

## 2024-10-23 RX ORDER — PHENAZOPYRIDINE HYDROCHLORIDE 200 MG/1
200 TABLET, FILM COATED ORAL
COMMUNITY
Start: 2024-10-22 | End: 2024-10-24

## 2024-10-23 NOTE — PROGRESS NOTES
"Sebastian is a 43 year old who is being evaluated via a billable video visit.    How would you like to obtain your AVS? MyChart  If the video visit is dropped, the invitation should be resent by: Text to cell phone: [phone]  Will anyone else be joining your video visit? No      Assessment & Plan     Pelvic pressure in female  Patient has been having ongoing lower pelvic pressure for the last 4 to 5 days.  Was seen in the urgent care and had negative urine pregnancy and urinalysis.  Denies any vaginal symptoms.  Discussed with patient possibility of uterine abnormality or fibroids causing symptoms or prolapse of uterus or bladder given patient has significant pressure at the end of urination.  Will first evaluate with pelvic ultrasound.  If negative/normal, would recommend further evaluation in the clinic with a vaginal exam to rule out prolapse.  If positive and shows uterine fibroids, will refer to OB/GYN.  Patient to schedule at [phone] or can wait for  to call.  - US Pelvic Complete with Transvaginal; Future          BMI  Estimated body mass index is 41.09 kg/m  as calculated from the following:    Height as of 5/1/24: 1.519 m (4' 11.8\").    Weight as of 5/1/24: 94.8 kg (209 lb).   Weight management plan: Not addressed today      See Patient Instructions    Subjective   Sebastian is a 43 year old, presenting for the following health issues:  Emergency Department (Follow up.)        10/23/2024     3:18 PM   Additional Questions   Roomed by Tiana LEE CMA     History of Present Illness       Reason for visit:  Urgent care follow up        ED/UC Followup:    Facility:  [facility] [place]   Date of visit: 10/22/2024  Reason for visit: Suprapubic pressure (Primary Dx)  Current Status: Still having pressure in her lower abdomen.  Worse at night, managing okay without medications.  Patient did start the Pyridium today.  Has history of ovarian cysts, so wondering if she can get a repeat " ultrasound.  This morning, woke up with pain in the lower right back.            Review of Systems  Constitutional, HEENT, cardiovascular, pulmonary, gi and gu systems are negative, except as otherwise noted.      Objective           Vitals:  No vitals were obtained today due to virtual visit.    Physical Exam   GENERAL: alert and no distress  EYES: Eyes grossly normal to inspection.  No discharge or erythema, or obvious scleral/conjunctival abnormalities.  RESP: No audible wheeze, cough, or visible cyanosis.    SKIN: Visible skin clear. No significant rash, abnormal pigmentation or lesions.  NEURO: Cranial nerves grossly intact.  Mentation and speech appropriate for age.  PSYCH: Appropriate affect, tone, and pace of words    Diagnostic Test Results:  Labs reviewed in Epic  Ultrasound ordered, pending      Video-Visit Details    Type of service:  Video Visit   Originating Location (pt. Location): Home    Distant Location (provider location):  On-site  Platform used for Video Visit: Rosaura  Signed Electronically by: True Esquivel DNP,, JOSE MANUEL CNP      Chart documentation with Dragon Voice recognition Software. Although reviewed after completion, some words and grammatical errors may remain.

## 2024-10-23 NOTE — PATIENT INSTRUCTIONS
Pelvic pressure in female  Patient has been having ongoing lower pelvic pressure for the last 4 to 5 days.  Was seen in the urgent care and had negative urine pregnancy and urinalysis.  Denies any vaginal symptoms.  Discussed with patient possibility of uterine abnormality or fibroids causing symptoms or prolapse of uterus or bladder given patient has significant pressure at the end of urination.  Will first evaluate with pelvic ultrasound.  If negative/normal, would recommend further evaluation in the clinic with a vaginal exam to rule out prolapse.  If positive and shows uterine fibroids, will refer to OB/GYN.  Patient to schedule at [phone] or can wait for  to call.  - US Pelvic Complete with Transvaginal; Future

## 2024-10-24 ENCOUNTER — HOSPITAL ENCOUNTER (OUTPATIENT)
Dept: ULTRASOUND IMAGING | Facility: CLINIC | Age: 44
Discharge: HOME | End: 2024-10-24
Attending: NURSE PRACTITIONER | Admitting: NURSE PRACTITIONER
Payer: COMMERCIAL

## 2024-10-24 DIAGNOSIS — R10.2 PELVIC PRESSURE IN FEMALE: ICD-10-CM

## 2024-10-24 PROCEDURE — 76856 US EXAM PELVIC COMPLETE: CPT

## 2024-11-07 ENCOUNTER — OFFICE VISIT (OUTPATIENT)
Dept: FAMILY MEDICINE | Facility: CLINIC | Age: 44
End: 2024-11-07
Payer: COMMERCIAL

## 2024-11-07 VITALS
DIASTOLIC BLOOD PRESSURE: 73 MMHG | RESPIRATION RATE: 20 BRPM | HEIGHT: 61 IN | OXYGEN SATURATION: 99 % | WEIGHT: 199 LBS | BODY MASS INDEX: 37.57 KG/M2 | SYSTOLIC BLOOD PRESSURE: 124 MMHG | TEMPERATURE: 97.6 F | HEART RATE: 89 BPM

## 2024-11-07 DIAGNOSIS — R10.2 PELVIC PAIN IN FEMALE: Primary | ICD-10-CM

## 2024-11-07 LAB
ALBUMIN UR-MCNC: NEGATIVE MG/DL
APPEARANCE UR: CLEAR
BILIRUB UR QL STRIP: NEGATIVE
CLUE CELLS: ABNORMAL
COLOR UR AUTO: YELLOW
GLUCOSE UR STRIP-MCNC: NEGATIVE MG/DL
HGB UR QL STRIP: NEGATIVE
KETONES UR STRIP-MCNC: NEGATIVE MG/DL
LEUKOCYTE ESTERASE UR QL STRIP: NEGATIVE
NITRATE UR QL: NEGATIVE
PH UR STRIP: 7 [PH] (ref 5–7)
SP GR UR STRIP: 1.01 (ref 1–1.03)
TRICHOMONAS, WET PREP: ABNORMAL
UROBILINOGEN UR STRIP-ACNC: 0.2 E.U./DL
WBC'S/HIGH POWER FIELD, WET PREP: ABNORMAL
YEAST, WET PREP: ABNORMAL

## 2024-11-07 PROCEDURE — 99213 OFFICE O/P EST LOW 20 MIN: CPT | Performed by: NURSE PRACTITIONER

## 2024-11-07 PROCEDURE — 87210 SMEAR WET MOUNT SALINE/INK: CPT | Performed by: NURSE PRACTITIONER

## 2024-11-07 PROCEDURE — 87086 URINE CULTURE/COLONY COUNT: CPT | Performed by: NURSE PRACTITIONER

## 2024-11-07 PROCEDURE — 81003 URINALYSIS AUTO W/O SCOPE: CPT | Performed by: NURSE PRACTITIONER

## 2024-11-07 ASSESSMENT — PAIN SCALES - GENERAL: PAINLEVEL_OUTOF10: NO PAIN (0)

## 2024-11-07 NOTE — PROGRESS NOTES
"  Assessment & Plan     Pelvic pain in female  Urine was negative repeated urine today will obtain urine culture.  Ultrasound was negative patient continues to have symptoms wet prep was negative recommend that we obtain a CT scan and will have her follow-up with OB/GYN.  - Wet prep - Clinic Collect  - UA Macroscopic with reflex to Microscopic and Culture - Clinic Collect  - Urine Culture Aerobic Bacterial - lab collect; Future  - Ob/Gyn  Referral; Future  - CT Abdomen Pelvis w Contrast; Future  - Urine Culture Aerobic Bacterial - lab collect        MED REC REQUIRED  Post Medication Reconciliation Status:  Discharge medications reconciled, continue medications without change    See Patient Instructions    Chino Cortes is a 43 year old, presenting for the following health issues:  ER F/U      11/7/2024     7:45 AM   Additional Questions   Roomed by Saida     History of Present Illness       Reason for visit:  ER follow up   She is taking medications regularly.         ED/UC Followup:    Facility:  [facility]  Date of visit: 10/22/2024  Reason for visit: Suprapubic pressure   Current Status: 10/24 pelvic ultrasound follow up        Review of Systems  Constitutional, HEENT, cardiovascular, pulmonary, gi and gu systems are negative, except as otherwise noted.      Objective    /73 (BP Location: Right arm)   Pulse 89   Temp 97.6  F (36.4  C) (Tympanic)   Resp 20   Ht 1.549 m (5' 1\")   Wt 90.3 kg (199 lb)   LMP 10/31/2024   SpO2 99%   BMI 37.60 kg/m    Body mass index is 37.6 kg/m .  Physical Exam   GENERAL: alert and no distress  NECK: no adenopathy, no asymmetry, masses, or scars  RESP: lungs clear to auscultation - no rales, rhonchi or wheezes  CV: regular rate and rhythm, normal S1 S2, no S3 or S4, no murmur, click or rub, no peripheral edema  ABDOMEN: soft, nontender, no hepatosplenomegaly, no masses and bowel sounds normal   (male): normal male genitalia without lesions or urethral " discharge, no hernia  MS: no gross musculoskeletal defects noted, no edema    Results for orders placed or performed in visit on 11/07/24   UA Macroscopic with reflex to Microscopic and Culture - Clinic Collect     Status: Normal    Specimen: Urine, Midstream   Result Value Ref Range    Color Urine Yellow Colorless, Straw, Light Yellow, Yellow    Appearance Urine Clear Clear    Glucose Urine Negative Negative mg/dL    Bilirubin Urine Negative Negative    Ketones Urine Negative Negative mg/dL    Specific Gravity Urine 1.015 1.003 - 1.035    Blood Urine Negative Negative    pH Urine 7.0 5.0 - 7.0    Protein Albumin Urine Negative Negative mg/dL    Urobilinogen Urine 0.2 0.2, 1.0 E.U./dL    Nitrite Urine Negative Negative    Leukocyte Esterase Urine Negative Negative    Narrative    Microscopic not indicated   Wet prep - Clinic Collect     Status: Abnormal    Specimen: Vagina; Swab   Result Value Ref Range    Trichomonas Absent Absent    Yeast Absent Absent    Clue Cells Absent Absent    WBCs/high power field 1+ (A) None           Signed Electronically by: JOSE MANUEL Cleaning CNP

## 2024-11-08 LAB — BACTERIA UR CULT: NORMAL

## 2024-11-18 ENCOUNTER — OFFICE VISIT (OUTPATIENT)
Dept: OBGYN | Facility: CLINIC | Age: 44
End: 2024-11-18
Attending: NURSE PRACTITIONER
Payer: COMMERCIAL

## 2024-11-18 VITALS
SYSTOLIC BLOOD PRESSURE: 127 MMHG | BODY MASS INDEX: 37.95 KG/M2 | DIASTOLIC BLOOD PRESSURE: 68 MMHG | HEART RATE: 98 BPM | WEIGHT: 201 LBS | RESPIRATION RATE: 18 BRPM | HEIGHT: 61 IN | TEMPERATURE: 99.2 F

## 2024-11-18 DIAGNOSIS — R10.2 PELVIC PRESSURE IN FEMALE: Primary | ICD-10-CM

## 2024-11-18 LAB
CLUE CELLS: ABNORMAL
TRICHOMONAS, WET PREP: ABNORMAL
WBC'S/HIGH POWER FIELD, WET PREP: ABNORMAL
YEAST, WET PREP: ABNORMAL

## 2024-11-18 PROCEDURE — 99459 PELVIC EXAMINATION: CPT | Performed by: PHYSICIAN ASSISTANT

## 2024-11-18 PROCEDURE — 87210 SMEAR WET MOUNT SALINE/INK: CPT | Performed by: PHYSICIAN ASSISTANT

## 2024-11-18 PROCEDURE — G2211 COMPLEX E/M VISIT ADD ON: HCPCS | Performed by: PHYSICIAN ASSISTANT

## 2024-11-18 PROCEDURE — 99213 OFFICE O/P EST LOW 20 MIN: CPT | Performed by: PHYSICIAN ASSISTANT

## 2024-11-18 NOTE — PROGRESS NOTES
[facility]  OB/GYN Clinic   Gynecology Consult Note    CC:     Chief Complaint   Patient presents with    Pelvic Pain       HPI: Ms. Cortes is a 43 year old  being seen for GYN consultation for pelvic pressure by the request of her provider, Cherelle Aaron. Today she reports pelvic pressure mainly noted after voiding that started just over a month ago. She states she had negative UA/UC, wet prep, and pelvic US. Her primary care provider has ordered a CT scan which she has scheduled for 24. She states she feels a fullness/pressure in the pelvis/vaginal area. She denies UTI symptoms, no vaginal irritation or discharge and no concerns for STD.  Patient on oral birth control.     Patient states  x2 and cholecystectomy for abdominal surgery history.     All recent notes, labs, and pelvic US reviewed today at visit.     GYN Hx: Reports menarche at age 12-13, regular menses every 28-30 days, lasting 4 days. Denies significant dysmenorrhea or spotting in between periods. Positive hx of ovarian cysts. No history of uterine fibroids or polyps. Denies any hx of STI or PID. Currently sexually active with . Currently using oral birth control for contraception. Denies any vaginal discharge, vulvar itching/burning or pain. Denies any dyspareunia or pelvic pain with defecation or urination.  Denies any history of breast mass or disease. She denies any nipple drainage or visual field defects.   Pt denies any personal or family history of VTE. She is a non-smoker. She denies a history of significant migraines with aura, liver disease or hypertension.     Denies any hx of excessive bleeding with dental work or cuts.     ROS: A 10 pt ROS was completed and found to be otherwise negative unless mentioned in the HPI.     PMH:   Past Medical History:   Diagnosis Date    Metrorrhagia 2013    Need for prophylactic hormone replacement therapy (postmenopausal)        PSHx:   Past Surgical  History:   Procedure Laterality Date    ABDOMEN SURGERY  ,    c-sections    C/SECTION, LOW TRANSVERSE  2004    , Low Transverse    C/SECTION, LOW TRANSVERSE      CHOLECYSTECTOMY  2009    COLONOSCOPY  2013    Procedure: COLONOSCOPY;  Colonoscopy  ;  Surgeon: Jorge Underwood MD;  Location: [facility]    GALLBLADDER SURGERY         OBHx:   OB History    Para Term  AB Living   2 2 2 0 0 1   SAB IAB Ectopic Multiple Live Births   0 0 0 0 1      # Outcome Date GA Lbr Moshe/2nd Weight Sex Type Anes PTL Lv   2 Term 09 39w0d  3.232 kg (7 lb 2 oz) M CS-LTranv Spinal N FIONA      Name: Jett      Apgar1: 8  Apgar5: 9   1 Term 04    F CS-LTranv          Medications:   Current Outpatient Medications   Medication Sig Dispense Refill    desogestrel-ethinyl estradiol (ENSKYCE) 0.15-30 MG-MCG tablet Take 1 tablet by mouth daily 84 tablet 3    SUMAtriptan (IMITREX) 25 MG tablet Take 1-2 tablets (25-50 mg) by mouth at onset of headache for migraine May repeat in 2 hours. Max 8 tablets/24 hours. 12 tablet 0    tirzepatide-Weight Management (ZEPBOUND) 7.5 MG/0.5ML prefilled pen Inject 0.5 mLs (7.5 mg) Subcutaneous every 7 days (Patient not taking: Reported on 2024) 2 mL 0    triamcinolone (KENALOG) 0.5 % external ointment Apply to rectal area two times daily as needed for itching (Patient not taking: Reported on 2024) 30 g 1     No current facility-administered medications for this visit.       Allergies:      Allergies   Allergen Reactions    Amoxicillin-Pot Clavulanate Rash     Rash     Pcn [Penicillins]      See augmentin reaction     Lavender Oil Rash       Social History:   Social History     Socioeconomic History    Marital status:      Spouse name: Not on file    Number of children: Not on file    Years of education: Not on file    Highest education level: Not on file   Occupational History    Not on file   Tobacco Use    Smoking status: Never      Passive exposure: Never    Smokeless tobacco: Never   Vaping Use    Vaping status: Never Used   Substance and Sexual Activity    Alcohol use: No     Comment: rare    Drug use: No    Sexual activity: Yes     Partners: Male     Birth control/protection: Pill   Other Topics Concern    Parent/sibling w/ CABG, MI or angioplasty before 65F 55M? No   Social History Narrative    Not on file     Social Drivers of Health     Financial Resource Strain: Low Risk  (1/31/2024)    Financial Resource Strain     Within the past 12 months, have you or your family members you live with been unable to get utilities (heat, electricity) when it was really needed?: No   Food Insecurity: Low Risk  (1/31/2024)    Food Insecurity     Within the past 12 months, did you worry that your food would run out before you got money to buy more?: No     Within the past 12 months, did the food you bought just not last and you didn t have money to get more?: No   Transportation Needs: Low Risk  (1/31/2024)    Transportation Needs     Within the past 12 months, has lack of transportation kept you from medical appointments, getting your medicines, non-medical meetings or appointments, work, or from getting things that you need?: No   Physical Activity: Insufficiently Active (1/31/2024)    Exercise Vital Sign     Days of Exercise per Week: 2 days     Minutes of Exercise per Session: 20 min   Stress: Stress Concern Present (1/31/2024)    [place] [employer] - Occupational Stress Questionnaire     Feeling of Stress : Very much   Social Connections: Unknown (1/31/2024)    Social Connection and Isolation Panel [NHANES]     Frequency of Communication with Friends and Family: Not on file     Frequency of Social Gatherings with Friends and Family: Once a week     Attends Anabaptism Services: Not on file     Active Member of Clubs or Organizations: Not on file     Attends Club or Organization Meetings: Not on file     Marital Status: Not on  file   Interpersonal Safety: Low Risk  (11/7/2024)    Interpersonal Safety     Do you feel physically and emotionally safe where you currently live?: Yes     Within the past 12 months, have you been hit, slapped, kicked or otherwise physically hurt by someone?: No     Within the past 12 months, have you been humiliated or emotionally abused in other ways by your partner or ex-partner?: No   Housing Stability: Low Risk  (1/31/2024)    Housing Stability     Do you have housing? : Yes     Are you worried about losing your housing?: No     Social History     Socioeconomic History    Marital status:    Tobacco Use    Smoking status: Never     Passive exposure: Never    Smokeless tobacco: Never   Vaping Use    Vaping status: Never Used   Substance and Sexual Activity    Alcohol use: No     Comment: rare    Drug use: No    Sexual activity: Yes     Partners: Male     Birth control/protection: Pill   Other Topics Concern    Parent/sibling w/ CABG, MI or angioplasty before 65F 55M? No     Social Drivers of Health     Financial Resource Strain: Low Risk  (1/31/2024)    Financial Resource Strain     Within the past 12 months, have you or your family members you live with been unable to get utilities (heat, electricity) when it was really needed?: No   Food Insecurity: Low Risk  (1/31/2024)    Food Insecurity     Within the past 12 months, did you worry that your food would run out before you got money to buy more?: No     Within the past 12 months, did the food you bought just not last and you didn t have money to get more?: No   Transportation Needs: Low Risk  (1/31/2024)    Transportation Needs     Within the past 12 months, has lack of transportation kept you from medical appointments, getting your medicines, non-medical meetings or appointments, work, or from getting things that you need?: No   Physical Activity: Insufficiently Active (1/31/2024)    Exercise Vital Sign     Days of Exercise per Week: 2 days      "Minutes of Exercise per Session: 20 min   Stress: Stress Concern Present (1/31/2024)    [place] [employer] Occupational [employer] - Occupational Stress Questionnaire     Feeling of Stress : Very much   Social Connections: Unknown (1/31/2024)    Social Connection and Isolation Panel [NHANES]     Frequency of Social Gatherings with Friends and Family: Once a week   Interpersonal Safety: Low Risk  (11/7/2024)    Interpersonal Safety     Do you feel physically and emotionally safe where you currently live?: Yes     Within the past 12 months, have you been hit, slapped, kicked or otherwise physically hurt by someone?: No     Within the past 12 months, have you been humiliated or emotionally abused in other ways by your partner or ex-partner?: No   Housing Stability: Low Risk  (1/31/2024)    Housing Stability     Do you have housing? : Yes     Are you worried about losing your housing?: No       Family History:   Family History   Problem Relation Age of Onset    Cancer Maternal Grandmother     Circulatory Mother 43        carotid and leg stents     Denies any family hx of bleeding disorders or reaction to anesthesia.     Physical Exam:   Vitals:    11/18/24 0859   BP: 127/68   BP Location: Right arm   Patient Position: Chair   Cuff Size: Adult Regular   Pulse: 98   Resp: 18   Temp: 99.2  F (37.3  C)   TempSrc: Tympanic   Weight: 91.2 kg (201 lb)   Height: 1.549 m (5' 1\")      Estimated body mass index is 37.98 kg/m  as calculated from the following:    Height as of this encounter: 1.549 m (5' 1\").    Weight as of this encounter: 91.2 kg (201 lb).    Gen: Pleasant, talkative female in no apparent distress   Respiratory: breathing comfortably on room air   Cardiac: Warm and well-perfused.   GI: Abd soft and non-tender, +BS  : External genitalia is free of lesion. Urethra and bartholin glands normal.  Vaginal mucosa is moist and pink. Wet prep obtained due to whitish vaginal discharge.  Cervix is without lesions or " discharge. No uterine prolapse noted throughout exam. No cystocele or rectocele noted on exam.  Bimanual exam reveals mobile anteverted uterus without cervical motion tenderness.  Adenexa are mobile and non-tender bilaterally. No appreciable adnexal enlargement. No tenderness over the bladder. Perineum intact. Dillon Stage 5.   Rectal: no masses or hemorrhoids visually appreciated  Derm: no acanthosis nigricans, ance or facial/back/abdominal hair growth patterns   MSK: Grossly normal movement of all four extremities  Psych: mood and affect bright   Lower extremity: edema not present     Labs/Imaging: reviewed pelvic US done on 10/24/24  Recent UA/UC, wet prep, and HCG all reviewed and negative. No new signs/symptoms today and patient declined STD testing today since she has no concerns for this.     A&P:     (R10.2) Pelvic pressure in female  (primary encounter diagnosis)  Comment: no findings consistent with cystocele, uterine prolapse, or rectocele. Recent pelvic US is normal. Patient scheduled for pelvic CT on 11/29/24. She states she will keep this to evaluate other pelvic structures. Discussed and referred patient for pelvic floor Physical Therapy. No concerns for PID today. No UTI symptoms or vaginal infection symptoms, but will recheck wet prep as there was some vaginal discharge.   Plan: Physical Therapy  Referral, Wet prep -        Clinic Collect          Return to clinic as needed if symptoms change, worsen, or new symptoms develop. Recommend pelvic floor Physical Therapy.     Jaclyn Gunderson PA-C

## 2024-11-18 NOTE — NURSING NOTE
"Initial /68 (BP Location: Right arm, Patient Position: Chair, Cuff Size: Adult Regular)   Pulse 98   Temp 99.2  F (37.3  C) (Tympanic)   Resp 18   Ht 1.549 m (5' 1\")   Wt 91.2 kg (201 lb)   LMP 10/31/2024   BMI 37.98 kg/m   Estimated body mass index is 37.98 kg/m  as calculated from the following:    Height as of this encounter: 1.549 m (5' 1\").    Weight as of this encounter: 91.2 kg (201 lb). .    "

## 2024-11-29 ENCOUNTER — HOSPITAL ENCOUNTER (OUTPATIENT)
Dept: CT IMAGING | Facility: CLINIC | Age: 44
Discharge: HOME | End: 2024-11-29
Attending: NURSE PRACTITIONER | Admitting: NURSE PRACTITIONER
Payer: COMMERCIAL

## 2024-11-29 DIAGNOSIS — R10.2 PELVIC PAIN IN FEMALE: ICD-10-CM

## 2024-11-29 PROCEDURE — 74177 CT ABD & PELVIS W/CONTRAST: CPT

## 2024-11-29 PROCEDURE — 250N000011 HC RX IP 250 OP 636: Performed by: NURSE PRACTITIONER

## 2024-11-29 PROCEDURE — 250N000009 HC RX 250: Performed by: NURSE PRACTITIONER

## 2024-11-29 RX ORDER — IOPAMIDOL 755 MG/ML
98 INJECTION, SOLUTION INTRAVASCULAR ONCE
Status: COMPLETED | OUTPATIENT
Start: 2024-11-29 | End: 2024-11-29

## 2024-11-29 RX ADMIN — IOPAMIDOL 98 ML: 755 INJECTION, SOLUTION INTRAVENOUS at 07:51

## 2024-11-29 RX ADMIN — SODIUM CHLORIDE 65 ML: 9 INJECTION, SOLUTION INTRAVENOUS at 07:51

## 2024-12-12 ENCOUNTER — TELEPHONE (OUTPATIENT)
Dept: FAMILY MEDICINE | Facility: CLINIC | Age: 44
End: 2024-12-12
Payer: COMMERCIAL

## 2024-12-12 DIAGNOSIS — N32.89 BLADDER WALL THICKENING: Primary | ICD-10-CM

## 2024-12-12 NOTE — TELEPHONE ENCOUNTER
Patient returned call, I relayed Cherelle Aaron NP detailed message dated 12/10/24 and she verbalized good understanding.     Patient states she was evaluated at [facility] ED on 12/1/24 treated with macrobid 100 mg BID x 5 days, states her symptoms have improved but still continues to have some bladder pressure.       Lab Results - (ABNORMAL) URINE CULTURE (12/01/2024 9:23 AM CST)  Component Value Ref Range Test Method Analysis Time Performed At Pathologist Signature   CULTURE RESULT (A)     [facility]     CULTURE >100,000 CFU/mL Staphylococcus saprophyticus     12/04/2024 9:13 AM CST [facility]        Huddled with Cherelle Aaron NP who states if patient was treated for UTI and sx's improved, plan to follow up with urology for bladder wall thickness noted on CT scan and patient was transferred to Urology  desk to schedule.     Update sent to Cherelle Aaron NP.     Julie Behrendt RN

## 2024-12-24 ENCOUNTER — OFFICE VISIT (OUTPATIENT)
Dept: UROLOGY | Facility: CLINIC | Age: 44
End: 2024-12-24
Attending: NURSE PRACTITIONER
Payer: COMMERCIAL

## 2024-12-24 VITALS
RESPIRATION RATE: 16 BRPM | HEIGHT: 60 IN | WEIGHT: 201 LBS | SYSTOLIC BLOOD PRESSURE: 110 MMHG | BODY MASS INDEX: 39.46 KG/M2 | TEMPERATURE: 97.7 F | DIASTOLIC BLOOD PRESSURE: 80 MMHG | HEART RATE: 84 BPM | OXYGEN SATURATION: 100 %

## 2024-12-24 DIAGNOSIS — N32.89 BLADDER WALL THICKENING: Primary | ICD-10-CM

## 2024-12-24 DIAGNOSIS — R33.9 INCOMPLETE BLADDER EMPTYING: ICD-10-CM

## 2024-12-24 DIAGNOSIS — R10.2 PELVIC PRESSURE IN FEMALE: ICD-10-CM

## 2024-12-24 LAB
ALBUMIN UR-MCNC: NEGATIVE MG/DL
APPEARANCE UR: CLEAR
BILIRUB UR QL STRIP: NEGATIVE
COLOR UR AUTO: NORMAL
GLUCOSE UR STRIP-MCNC: NEGATIVE MG/DL
HGB UR QL STRIP: NEGATIVE
KETONES UR STRIP-MCNC: NEGATIVE MG/DL
LEUKOCYTE ESTERASE UR QL STRIP: NEGATIVE
NITRATE UR QL: NEGATIVE
PH UR STRIP: 6.5 [PH] (ref 5–7)
SP GR UR STRIP: 1.01 (ref 1–1.03)
UROBILINOGEN UR STRIP-MCNC: NORMAL MG/DL

## 2024-12-24 ASSESSMENT — PAIN SCALES - GENERAL: PAINLEVEL_OUTOF10: NO PAIN (0)

## 2024-12-24 NOTE — PROGRESS NOTES
CC: Pelvic Pressure, Bladder wall thickening     HPI:   Ms. Sebastian Schuster, is a pleasant 44 year old female, requested to be seen by Cherelle Aaron for evaluation of bladder wall thickening.     The patient denies  gross hematuria.  Ms. Schuster voids without difficulty.  She currently denies any dysuria, pyuria, hesitancy, intermittency, feelings of incomplete emptying, or any recent history of urinary tract infections or stones.    The patient was seen on 2024 for urinary urgency and pressure, ongoing from October. Urine culture was negative, CT scan with contrast was complete that showed  bladder wall thickening. UA showed 3-5 RBC's (last day of her cycle).     She states that she feels like she has a boweling ball in her pelvis, this has progressively gotten better but feels it at the end of her urination stream.   The pressure is bothersome to her. Frequency of urination has resolved. She notes that prolapse has been ruled out and has numerous pelvic examinations that were all negative.     Fluids: Water 24-48 oz    Soda 24 oz        Hematuria Risk Factors:  Age >40: Yes     Smoking history: non-smoker and no second hand smoke exposure  Occupational exposure to chemicals or dyes (ie, benzenes, aromatic amines): no  History of urologic disorder or disease: no  History of irritative voiding symptoms: no  History of urinary tract infection: yes just one   Analgesic abuse: no  History of pelvic irradiation: no    OB:   2 children caesarian birth  Regular periods     Past Medical History:   Diagnosis Date    Metrorrhagia 2013    Need for prophylactic hormone replacement therapy (postmenopausal)      Past Surgical History:   Procedure Laterality Date    ABDOMEN SURGERY  ,    c-sections    C/SECTION, LOW TRANSVERSE  2004    , Low Transverse    C/SECTION, LOW TRANSVERSE      CHOLECYSTECTOMY  2009    COLONOSCOPY  2013    Procedure: COLONOSCOPY;  Colonoscopy  ;  Surgeon:  Jorge Underwood MD;  Location: [facility]    GALLBLADDER SURGERY  2009     Current Outpatient Medications   Medication Sig Dispense Refill    desogestrel-ethinyl estradiol (ENSKYCE) 0.15-30 MG-MCG tablet Take 1 tablet by mouth daily 84 tablet 3    SUMAtriptan (IMITREX) 25 MG tablet Take 1-2 tablets (25-50 mg) by mouth at onset of headache for migraine May repeat in 2 hours. Max 8 tablets/24 hours. 12 tablet 0    tirzepatide-Weight Management (ZEPBOUND) 7.5 MG/0.5ML prefilled pen Inject 0.5 mLs (7.5 mg) Subcutaneous every 7 days (Patient not taking: Reported on 11/18/2024) 2 mL 0    triamcinolone (KENALOG) 0.5 % external ointment Apply to rectal area two times daily as needed for itching (Patient not taking: Reported on 11/18/2024) 30 g 1     Allergies   Allergen Reactions    Amoxicillin-Pot Clavulanate Rash     Rash     Pcn [Penicillins]      See augmentin reaction     Lavender Oil Rash     FAMILY HISTORY: There is no reported history of genitourinary carcinoma.  There is no history of urolithiasis.      Social History     Socioeconomic History    Marital status:      Spouse name: Not on file    Number of children: Not on file    Years of education: Not on file    Highest education level: Not on file   Occupational History    Not on file   Tobacco Use    Smoking status: Never     Passive exposure: Never    Smokeless tobacco: Never   Vaping Use    Vaping status: Never Used   Substance and Sexual Activity    Alcohol use: No     Comment: rare    Drug use: No    Sexual activity: Yes     Partners: Male     Birth control/protection: Pill   Other Topics Concern    Parent/sibling w/ CABG, MI or angioplasty before 65F 55M? No   Social History Narrative    Not on file     Social Drivers of Health     Financial Resource Strain: Low Risk  (1/31/2024)    Financial Resource Strain     Within the past 12 months, have you or your family members you live with been unable to get utilities (heat, electricity) when it was  really needed?: No   Food Insecurity: Low Risk  (1/31/2024)    Food Insecurity     Within the past 12 months, did you worry that your food would run out before you got money to buy more?: No     Within the past 12 months, did the food you bought just not last and you didn t have money to get more?: No   Transportation Needs: Low Risk  (1/31/2024)    Transportation Needs     Within the past 12 months, has lack of transportation kept you from medical appointments, getting your medicines, non-medical meetings or appointments, work, or from getting things that you need?: No   Physical Activity: Insufficiently Active (1/31/2024)    Exercise Vital Sign     Days of Exercise per Week: 2 days     Minutes of Exercise per Session: 20 min   Stress: Stress Concern Present (1/31/2024)    [place] of [employer]ational [employer] - Occupational Stress Questionnaire     Feeling of Stress : Very much   Social Connections: Unknown (1/31/2024)    Social Connection and Isolation Panel [NHANES]     Frequency of Communication with Friends and Family: Not on file     Frequency of Social Gatherings with Friends and Family: Once a week     Attends Gnosticist Services: Not on file     Active Member of Clubs or Organizations: Not on file     Attends Club or Organization Meetings: Not on file     Marital Status: Not on file   Interpersonal Safety: Low Risk  (11/7/2024)    Interpersonal Safety     Do you feel physically and emotionally safe where you currently live?: Yes     Within the past 12 months, have you been hit, slapped, kicked or otherwise physically hurt by someone?: No     Within the past 12 months, have you been humiliated or emotionally abused in other ways by your partner or ex-partner?: No   Housing Stability: Low Risk  (1/31/2024)    Housing Stability     Do you have housing? : Yes     Are you worried about losing your housing?: No       PHYSICAL EXAM:   /80 (BP Location: Right arm, Patient Position: Sitting, Cuff Size:  Adult Regular)   Pulse 84   Temp 97.7  F (36.5  C) (Temporal)   Resp 16   Ht 1.524 m (5')   Wt 91.2 kg (201 lb)   LMP 10/31/2024   SpO2 100%   BMI 39.26 kg/m    GENERAL: alert and no distress  EYES: Eyes grossly normal to inspection.  No discharge or erythema, or obvious scleral/conjunctival abnormalities.  RESP: No audible wheeze, cough, or visible cyanosis.    SKIN: Visible skin clear. No significant rash, abnormal pigmentation or lesions.  NEURO: Cranial nerves grossly intact.  Mentation and speech appropriate for age.  PSYCH: Appropriate affect, tone, and pace of words  PVR: 169 ml     Results for orders placed or performed in visit on 12/24/24   UA Macroscopic with reflex to Microscopic and Culture     Status: Normal    Specimen: Urine, Midstream   Result Value Ref Range    Color Urine Straw Colorless, Straw, Light Yellow, Yellow    Appearance Urine Clear Clear    Glucose Urine Negative Negative mg/dL    Bilirubin Urine Negative Negative    Ketones Urine Negative Negative mg/dL    Specific Gravity Urine 1.014 1.003 - 1.035    Blood Urine Negative Negative    pH Urine 6.5 5.0 - 7.0    Protein Albumin Urine Negative Negative mg/dL    Urobilinogen Urine Normal Normal, 2.0 mg/dL    Nitrite Urine Negative Negative    Leukocyte Esterase Urine Negative Negative    Narrative    Microscopic not indicated         Office Visit on 11/18/2024   Component Date Value Ref Range Status    Trichomonas 11/18/2024 Absent  Absent Final    Yeast 11/18/2024 Absent  Absent Final    Clue Cells 11/18/2024 Absent  Absent Final    WBCs/high power field 11/18/2024 2+ (A)  None Final       IMAGING:   Narrative & Impression   CT ABDOMEN PELVIS W CONTRAST 11/29/2024 8:04 AM     CLINICAL HISTORY: Pelvic pain in female     TECHNIQUE: CT scan of the abdomen and pelvis was performed following  injection of IV contrast. Multiplanar reformats were obtained. Dose  reduction techniques were used.  CONTRAST: 98 mL Isovue 370     COMPARISON:  Pelvic ultrasound 10/24/2024     FINDINGS:   LOWER CHEST: Normal.     HEPATOBILIARY: No suspicious liver lesion. Cholecystectomy. No  significant biliary dilation.     PANCREAS: No significant mass, duct dilatation, or inflammatory  change.     SPLEEN: Normal.     ADRENAL GLANDS: Normal.     KIDNEYS/BLADDER: No significant mass, stones, or hydronephrosis.  Incomplete bladder distention although there is suggestion of some  bladder thickening and bladder wall enhancement.     BOWEL: There are a couple of short segment small bowel-small bowel  intussusceptions, such as the proximal jejunum and in the left  hemiabdomen. No obstruction. No significant bowel inflammation. Normal  appendix. Normal appearance to the colon.     LYMPH NODES: No abdominopelvic lymphadenopathy.     VASCULAR: Normal abdominal aorta. Circumaortic left renal vein.     PELVIC ORGANS: No significant pelvic mass.     ADDITIONAL FINDINGS: No significant free fluid. No free air.     MUSCULOSKELETAL: Bilateral L5 spondylolysis without significant  spondylolisthesis.                                                                      IMPRESSION:   1.  Bladder wall thickening versus incomplete bladder distention. In  the setting of pelvic pain, urinalysis correlation recommended as this  appearance could represent cystitis. Otherwise, no specific CT  findings to explain the patient's symptoms.     2.  Several short segment nonobstructing small bowel-small bowel  intussusceptions. These are typically transient and should be of no  clinical significance.          ASSESSMENT and PLAN:    44 year old female with several month history of pelvic pressure and incidental finding of bladder wall thickening.    1. Bladder wall thickening (Primary)  At this time, recommend proceeding with evaluation to include:  - Cystoscopy with Dr. Burton urologist to evaluate the interior of the bladder.     2. Incomplete bladder emptying  Discussed as possible etiology for  pelvic pressure and bladder wall thickening.   Patient to complete in office cystoscopy.   - Physical Therapy  Referral; Future  - MEASURE POST-VOID RESIDUAL URINE/BLADDER CAPACITY, US NON-IMAGING ([id number])    3. Pelvic pressure in female  I believe that the most likely cause of her discomfort at this time is pelvic floor dysfunction. We discussed this diagnosis in detail including the way the pelvic floor relates to her pelvic anatomy. With this being the case, my main recommendation is a referral to the pelvic or physical therapy department for evaluation and discussion of her symptoms.     JOSE MANUEL Arrington CNP

## 2025-01-02 ENCOUNTER — PATIENT OUTREACH (OUTPATIENT)
Dept: CARE COORDINATION | Facility: CLINIC | Age: 45
End: 2025-01-02
Payer: COMMERCIAL

## 2025-01-15 ENCOUNTER — OFFICE VISIT (OUTPATIENT)
Dept: UROLOGY | Facility: CLINIC | Age: 45
End: 2025-01-15
Payer: COMMERCIAL

## 2025-01-15 DIAGNOSIS — N32.89 BLADDER WALL THICKENING: Primary | ICD-10-CM

## 2025-01-15 DIAGNOSIS — R31.29 MICROSCOPIC HEMATURIA: ICD-10-CM

## 2025-01-15 DIAGNOSIS — R10.2 PELVIC PRESSURE IN FEMALE: ICD-10-CM

## 2025-01-15 DIAGNOSIS — R33.9 INCOMPLETE BLADDER EMPTYING: ICD-10-CM

## 2025-01-15 PROCEDURE — 51798 US URINE CAPACITY MEASURE: CPT | Performed by: UROLOGY

## 2025-01-15 PROCEDURE — 99213 OFFICE O/P EST LOW 20 MIN: CPT | Mod: 25 | Performed by: UROLOGY

## 2025-01-15 PROCEDURE — 52000 CYSTOURETHROSCOPY: CPT | Performed by: UROLOGY

## 2025-01-15 NOTE — PROGRESS NOTES
CC:    HPI:  Sebastian Schuster is a 44 year old female who was seen for pelvic pressure with bladder wall thickening/microscopic hematuria/incomplete bladder emptying.  She c/o pelvic pressure for several months.  Imaging studies done recently were normal.  There is bladder wall thickening.  She has mild LUTS.  UA showed microscopic hematuria.  Recent bladder scan showed incomplete bladder emptying.  She has no dysuria or hematuria.  Current Outpatient Medications   Medication Sig Dispense Refill    desogestrel-ethinyl estradiol (ENSKYCE) 0.15-30 MG-MCG tablet Take 1 tablet by mouth daily 84 tablet 3     Allergies   Allergen Reactions    Amoxicillin-Pot Clavulanate Rash     Rash     Pcn [Penicillins]      See augmentin reaction     Lavender Oil Rash     Past Medical History:   Diagnosis Date    Metrorrhagia 2013    Need for prophylactic hormone replacement therapy (postmenopausal)      Past Surgical History:   Procedure Laterality Date    ABDOMEN SURGERY  ,    c-sections    C/SECTION, LOW TRANSVERSE  2004    , Low Transverse    C/SECTION, LOW TRANSVERSE      CHOLECYSTECTOMY  2009    COLONOSCOPY  2013    Procedure: COLONOSCOPY;  Colonoscopy  ;  Surgeon: oJrge Underwood MD;  Location: [facility]    GALLBLADDER SURGERY        Family History   Problem Relation Age of Onset    Cancer Maternal Grandmother     Circulatory Mother 43        carotid and leg stents     Social History     Socioeconomic History    Marital status:    Tobacco Use    Smoking status: Never     Passive exposure: Never    Smokeless tobacco: Never   Vaping Use    Vaping status: Never Used   Substance and Sexual Activity    Alcohol use: No     Comment: rare    Drug use: No    Sexual activity: Yes     Partners: Male     Birth control/protection: Pill   Other Topics Concern    Parent/sibling w/ CABG, MI or angioplasty before 65F 55M? No     Social Drivers of Health     Financial Resource Strain: Low Risk   (1/31/2024)    Financial Resource Strain     Within the past 12 months, have you or your family members you live with been unable to get utilities (heat, electricity) when it was really needed?: No   Food Insecurity: Low Risk  (1/31/2024)    Food Insecurity     Within the past 12 months, did you worry that your food would run out before you got money to buy more?: No     Within the past 12 months, did the food you bought just not last and you didn t have money to get more?: No   Transportation Needs: Low Risk  (1/31/2024)    Transportation Needs     Within the past 12 months, has lack of transportation kept you from medical appointments, getting your medicines, non-medical meetings or appointments, work, or from getting things that you need?: No   Physical Activity: Insufficiently Active (1/31/2024)    Exercise Vital Sign     Days of Exercise per Week: 2 days     Minutes of Exercise per Session: 20 min   Stress: Stress Concern Present (1/31/2024)    [employer] - Occupational Stress Questionnaire     Feeling of Stress : Very much   Social Connections: Unknown (1/31/2024)    Social Connection and Isolation Panel [NHANES]     Frequency of Social Gatherings with Friends and Family: Once a week   Interpersonal Safety: Low Risk  (11/7/2024)    Interpersonal Safety     Do you feel physically and emotionally safe where you currently live?: Yes     Within the past 12 months, have you been hit, slapped, kicked or otherwise physically hurt by someone?: No     Within the past 12 months, have you been humiliated or emotionally abused in other ways by your partner or ex-partner?: No   Housing Stability: Low Risk  (1/31/2024)    Housing Stability     Do you have housing? : Yes     Are you worried about losing your housing?: No       REVIEW OF SYSTEMS  =================  C: NEGATIVE for fever, chills, change in weight  I: NEGATIVE for worrisome rashes, moles or lesions  E/M: NEGATIVE for ear, mouth and  throat problems  R: NEGATIVE for significant cough or SHORTNESS OF BREATH  CV:  NEGATIVE for chest pain, palpitations or peripheral edema  GI: NEGATIVE for nausea, abdominal pain, heartburn, or change in bowel habits  NEURO: NEGATIVE numbness/weakness  : see HPI  PSYCH: NEGATIVE depression/anxiety  LYmph: no new enlarged lymph nodes  Ortho: no new trauma/movements      Physical Exam:     Patient is pleasant, in no acute distress, good general condition.  HEENT:  Normalcephalic, atraumatic  Lung: no evidence of respiratory distress    Abdomen: Soft, nondistended, non tender. No masses. No rebound or guarding.  :  normal vaginal introitus  Skin: Warm and dry.  No redness.  Neuro: grossly normal  Psych normal mood and affect  Musculoskeletal  moving all extremities    RU: 1 ml    UA dipstick:          Office Visit on 12/24/2024   Component Date Value Ref Range Status    Color Urine 12/24/2024 Straw  Colorless, Straw, Light Yellow, Yellow Final    Appearance Urine 12/24/2024 Clear  Clear Final    Glucose Urine 12/24/2024 Negative  Negative mg/dL Final    Bilirubin Urine 12/24/2024 Negative  Negative Final    Ketones Urine 12/24/2024 Negative  Negative mg/dL Final    Specific Gravity Urine 12/24/2024 1.014  1.003 - 1.035 Final    Blood Urine 12/24/2024 Negative  Negative Final    pH Urine 12/24/2024 6.5  5.0 - 7.0 Final    Protein Albumin Urine 12/24/2024 Negative  Negative mg/dL Final    Urobilinogen Urine 12/24/2024 Normal  Normal, 2.0 mg/dL Final    Nitrite Urine 12/24/2024 Negative  Negative Final    Leukocyte Esterase Urine 12/24/2024 Negative  Negative Final    Final Diagnosis 12/24/2024    Final                    Value:Specimen A.  Urine cytology:     Interpretation:      Negative for High Grade Urothelial Carcinoma     Adequacy:     Satisfactory for evaluation          Gross Description 12/24/2024    Final                    Value:A(A). Urine, Midstream, :A. Urine, Midstream, , Urine Cytology:  Received 50  ml of clear, yellow fluid, processed as 1 Pap stained [employer].      Microscopic Description 12/24/2024    Final                    Value:Microscopic examination was performed and results are incorporated into the final diagnosis.      Performing Labs 12/24/2024    Final                    Value:The technical component of this testing was completed at [facility].     Stain controls for all stains resulted within this report have been reviewed and show appropriate reactivity.          Cysto done today    Patient is prepped and draped.  Flexible cystoscope placed under direct vision.      Cysto: The anterior urethra is normal     In the bladder there is normal mucosa.    Assessment/Plan:  (N32.89) Bladder wall thickening  (primary encounter diagnosis)  Comment:     Plan: CYSTOURETHROSCOPY (39015) - neg             (R31.29) Microscopic hematuria  Comment:    Plan: neg cysto    (R10.2) Pelvic pressure in female  Comment:    Plan: patient to see physical therapy    (R33.9) Incomplete bladder emptying  Comment: repeat PVR today showed only 1 ml  Plan: reassurance.

## 2025-01-16 ENCOUNTER — PATIENT OUTREACH (OUTPATIENT)
Dept: CARE COORDINATION | Facility: CLINIC | Age: 45
End: 2025-01-16
Payer: COMMERCIAL

## 2025-01-25 DIAGNOSIS — N92.1 METRORRHAGIA: ICD-10-CM

## 2025-01-27 RX ORDER — DESOGESTREL AND ETHINYL ESTRADIOL 0.15-0.03
1 KIT ORAL DAILY
Qty: 84 TABLET | Refills: 2 | Status: SHIPPED | OUTPATIENT
Start: 2025-01-27

## 2025-02-05 ENCOUNTER — VIRTUAL VISIT (OUTPATIENT)
Dept: FAMILY MEDICINE | Facility: CLINIC | Age: 45
End: 2025-02-05
Payer: COMMERCIAL

## 2025-02-05 DIAGNOSIS — E66.09 CLASS 2 OBESITY DUE TO EXCESS CALORIES WITHOUT SERIOUS COMORBIDITY WITH BODY MASS INDEX (BMI) OF 39.0 TO 39.9 IN ADULT: Primary | ICD-10-CM

## 2025-02-05 DIAGNOSIS — E66.812 CLASS 2 OBESITY DUE TO EXCESS CALORIES WITHOUT SERIOUS COMORBIDITY WITH BODY MASS INDEX (BMI) OF 39.0 TO 39.9 IN ADULT: Primary | ICD-10-CM

## 2025-02-05 PROBLEM — E66.01 MORBID OBESITY WITH BMI OF 40.0-44.9, ADULT (H): Chronic | Status: RESOLVED | Noted: 2018-09-27 | Resolved: 2025-02-05

## 2025-02-05 PROCEDURE — 98005 SYNCH AUDIO-VIDEO EST LOW 20: CPT | Performed by: NURSE PRACTITIONER

## 2025-02-05 RX ORDER — TOPIRAMATE 25 MG/1
TABLET, FILM COATED ORAL
Qty: 59 TABLET | Refills: 0 | Status: SHIPPED | OUTPATIENT
Start: 2025-02-05 | End: 2025-03-08

## 2025-02-05 RX ORDER — PHENTERMINE HYDROCHLORIDE 15 MG/1
15 CAPSULE ORAL EVERY MORNING
Qty: 30 CAPSULE | Refills: 0 | Status: SHIPPED | OUTPATIENT
Start: 2025-02-05

## 2025-02-05 NOTE — NURSING NOTE
Chief Complaint   Patient presents with    Weight Check       Initial There were no vitals taken for this visit. Estimated body mass index is 39.26 kg/m  as calculated from the following:    Height as of 12/24/24: 1.524 m (5').    Weight as of 12/24/24: 91.2 kg (201 lb).    Patient presents to the clinic using No DME    Is there anyone who you would like to be able to receive your results? No  If yes have patient fill out DANE

## 2025-02-05 NOTE — PATIENT INSTRUCTIONS
Class 2 obesity due to excess calories without serious comorbidity with body mass index (BMI) of 39.0 to 39.9 in adult  Chronic difficulty losing weight.  Patient is following a good diet regimen and just got back into a workout routine as well.  Previously on phentermine and topiramate combination, Zepbound and semaglutide for weight management.  Did initially do well on phentermine and topiramate, however seem to plateau.  Transition to Zepbound, however was backordered and not available so switched to semaglutide.  Did tolerate, however did not seem to help weight much.  Has been off of all medications since approximately November.  Patient's weight has been stable.  BMI still 39.  Patient would like to discuss weight management plan.  Discussed retrialing phentermine and topiramate as this initially worked for her, patient is agreeable to this.  Will taper up topiramate after a few days.  Patient to follow-up with provider in 1 month for weight recheck and possible titration.  Encouraged to continue following a healthy diet, increasing daily physical activity, ensuring good sleep and stress management.  - phentermine 15 MG capsule; Take 1 capsule (15 mg) by mouth every morning.  - topiramate (TOPAMAX) 25 MG tablet; Take 1 tablet (25 mg) by mouth daily for 3 days, THEN 1 tablet (25 mg) 2 times daily for 28 days.

## 2025-02-05 NOTE — PROGRESS NOTES
Sebastian is a 44 year old who is being evaluated via a billable video visit.    How would you like to obtain your AVS? MyChart  If the video visit is dropped, the invitation should be resent by: Text to cell phone: [phone]  Will anyone else be joining your video visit? No      Assessment & Plan     Class 2 obesity due to excess calories without serious comorbidity with body mass index (BMI) of 39.0 to 39.9 in adult  Chronic difficulty losing weight.  Patient is following a good diet regimen and just got back into a workout routine as well.  Previously on phentermine and topiramate combination, Zepbound and semaglutide for weight management.  Did initially do well on phentermine and topiramate, however seem to plateau.  Transition to Zepbound, however was backordered and not available so switched to semaglutide.  Did tolerate, however did not seem to help weight much.  Has been off of all medications since approximately November.  Patient's weight has been stable.  BMI still 39.  Patient would like to discuss weight management plan.  Discussed retrialing phentermine and topiramate as this initially worked for her, patient is agreeable to this.  Will taper up topiramate after a few days.  Patient to follow-up with provider in 1 month for weight recheck and possible titration.  Encouraged to continue following a healthy diet, increasing daily physical activity, ensuring good sleep and stress management.  - phentermine 15 MG capsule; Take 1 capsule (15 mg) by mouth every morning.  - topiramate (TOPAMAX) 25 MG tablet; Take 1 tablet (25 mg) by mouth daily for 3 days, THEN 1 tablet (25 mg) 2 times daily for 28 days.            See Patient Instructions    Subjective   Sebastian is a 44 year old, presenting for the following health issues:  Weight Check        2/5/2025     3:43 PM   Additional Questions   Roomed by Radha BENDER([facility])     HPI     Weight management program  Wt Readings from Last 4 Encounters:   12/24/24 91.2 kg (201 lb)    11/18/24 91.2 kg (201 lb)   11/07/24 90.3 kg (199 lb)   05/01/24 94.8 kg (209 lb)     Weight 200 lb currently   Just got back on a work out regimen ~ at least 3 times weekly  Trying to eat about the same both portion control and eating healthy   Sleeping well most of the time, at least 7 hours of sleep  Stress levels manageable    Most recently on Zepbound ~ went off because on backorder, then was on Semaglutide until November or so, but wasn't seeing a big difference so stopped.   Previously on phentermine and topiramate which did work initially quite well in 2023      Review of Systems  Constitutional, HEENT, cardiovascular, pulmonary, gi and gu systems are negative, except as otherwise noted.      Objective    Vitals - Patient Reported  Weight (Patient Reported): 90.7 kg (200 lb)      Vitals:  No vitals were obtained today due to virtual visit.    Physical Exam   GENERAL: alert and no distress  EYES: Eyes grossly normal to inspection.  No discharge or erythema, or obvious scleral/conjunctival abnormalities.  RESP: No audible wheeze, cough, or visible cyanosis.    SKIN: Visible skin clear. No significant rash, abnormal pigmentation or lesions.  NEURO: Cranial nerves grossly intact.  Mentation and speech appropriate for age.  PSYCH: Appropriate affect, tone, and pace of words    Diagnostic Test Results:  Labs reviewed in Epic  none        Video-Visit Details    Type of service:  Video Visit   Originating Location (pt. Location): Home    Distant Location (provider location):  On-site  Platform used for Video Visit: Rosaura  Signed Electronically by: Purnima Biswas DNP      Chart documentation with Dragon Voice recognition Software. Although reviewed after completion, some words and grammatical errors may remain.

## 2025-03-02 ENCOUNTER — HEALTH MAINTENANCE LETTER (OUTPATIENT)
Age: 45
End: 2025-03-02

## 2025-03-05 ENCOUNTER — VIRTUAL VISIT (OUTPATIENT)
Dept: FAMILY MEDICINE | Facility: CLINIC | Age: 45
End: 2025-03-05
Payer: COMMERCIAL

## 2025-03-05 DIAGNOSIS — E66.812 CLASS 2 OBESITY DUE TO EXCESS CALORIES WITHOUT SERIOUS COMORBIDITY WITH BODY MASS INDEX (BMI) OF 39.0 TO 39.9 IN ADULT: ICD-10-CM

## 2025-03-05 DIAGNOSIS — E66.09 CLASS 2 OBESITY DUE TO EXCESS CALORIES WITHOUT SERIOUS COMORBIDITY WITH BODY MASS INDEX (BMI) OF 39.0 TO 39.9 IN ADULT: ICD-10-CM

## 2025-03-05 PROCEDURE — 98005 SYNCH AUDIO-VIDEO EST LOW 20: CPT | Performed by: NURSE PRACTITIONER

## 2025-03-05 RX ORDER — PHENTERMINE HYDROCHLORIDE 37.5 MG/1
37.5 CAPSULE ORAL EVERY MORNING
Qty: 30 CAPSULE | Refills: 0 | Status: SHIPPED | OUTPATIENT
Start: 2025-03-05

## 2025-03-05 RX ORDER — TOPIRAMATE 50 MG/1
50 TABLET, FILM COATED ORAL 2 TIMES DAILY
Qty: 60 TABLET | Refills: 0 | Status: SHIPPED | OUTPATIENT
Start: 2025-03-05

## 2025-03-05 NOTE — PROGRESS NOTES
Sebastian is a 44 year old who is being evaluated via a billable video visit.    How would you like to obtain your AVS? MyChart  If the video visit is dropped, the invitation should be resent by: Text to cell phone: [phone]  Will anyone else be joining your video visit? No  {If patient encounters technical issues they should call [phone] :[id number]}    Assessment & Plan     {Diag Picklist:921989}            See Patient Instructions    Subjective   Sebastian is a 44 year old, presenting for the following health issues:  Weight Loss        3/5/2025     3:46 PM   Additional Questions   Roomed by dimitri   Accompanied by self     HPI      Medication Followup of  phentermine and topamax  Taking Medication as prescribed: yes  Side Effects:  None  Medication Helping Symptoms:  yes    There were no vitals filed for this visit.  Wt Readings from Last 2 Encounters:   12/24/24 91.2 kg (201 lb)   11/18/24 91.2 kg (201 lb)      03/5/2025 200lb     Things are slow   Not really noting much of an appetite decrease  Weight is the same       Review of Systems  Constitutional, HEENT, cardiovascular, pulmonary, gi and gu systems are negative, except as otherwise noted.      Objective    Vitals - Patient Reported  Weight (Patient Reported): 90.7 kg (200 lb)      Vitals:  No vitals were obtained today due to virtual visit.    Physical Exam   GENERAL: alert and no distress  EYES: Eyes grossly normal to inspection.  No discharge or erythema, or obvious scleral/conjunctival abnormalities.  RESP: No audible wheeze, cough, or visible cyanosis.    SKIN: Visible skin clear. No significant rash, abnormal pigmentation or lesions.  NEURO: Cranial nerves grossly intact.  Mentation and speech appropriate for age.  PSYCH: Appropriate affect, tone, and pace of words    Diagnostic Test Results:  none        Video-Visit Details    Type of service:  Video Visit   Originating Location (pt. Location): Home  {PROVIDER LOCATION On-site should be selected for  visits conducted from your clinic location or adjoining [facility] hospital, academic office, or other nearby [facility] building. Off-site should be selected for all other provider locations, including home:[id number]}  Distant Location (provider location):  On-site  Platform used for Video Visit: Rosaura  Signed Electronically by: Purnima Biswas DNP  {Email feedback regarding this note to [email or web address]   :[id number]}    Chart documentation with Dragon Voice recognition Software. Although reviewed after completion, some words and grammatical errors may remain.

## 2025-03-11 NOTE — PATIENT INSTRUCTIONS
Class 2 obesity due to excess calories without serious comorbidity with body mass index (BMI) of 39.0 to 39.9 in adult  Chronic, restarted on phentermine and topiramate last month.  Patient is tolerating well, however has not had any movement in weight.  Not really noting much of an appetite decrease.  Currently on topiramate 25 mg twice daily and 15 mg of phentermine.  Discussed increasing dosages and patient is agreeable to this.  Is continuing to follow a healthy diet and working on increasing exercise.  Patient to follow-up in 1 month for recheck.  - phentermine (ADIPEX-P) 37.5 MG capsule; Take 1 capsule (37.5 mg) by mouth every morning.  - topiramate (TOPAMAX) 50 MG tablet; Take 1 tablet (50 mg) by mouth 2 times daily.

## 2025-04-16 ENCOUNTER — VIRTUAL VISIT (OUTPATIENT)
Dept: FAMILY MEDICINE | Facility: CLINIC | Age: 45
End: 2025-04-16
Payer: COMMERCIAL

## 2025-04-16 DIAGNOSIS — E66.812 CLASS 2 OBESITY DUE TO EXCESS CALORIES WITHOUT SERIOUS COMORBIDITY WITH BODY MASS INDEX (BMI) OF 39.0 TO 39.9 IN ADULT: ICD-10-CM

## 2025-04-16 DIAGNOSIS — E66.09 CLASS 2 OBESITY DUE TO EXCESS CALORIES WITHOUT SERIOUS COMORBIDITY WITH BODY MASS INDEX (BMI) OF 39.0 TO 39.9 IN ADULT: ICD-10-CM

## 2025-04-16 PROCEDURE — 98005 SYNCH AUDIO-VIDEO EST LOW 20: CPT | Performed by: NURSE PRACTITIONER

## 2025-04-16 RX ORDER — PHENTERMINE HYDROCHLORIDE 37.5 MG/1
37.5 CAPSULE ORAL EVERY MORNING
Qty: 30 CAPSULE | Refills: 0 | Status: SHIPPED | OUTPATIENT
Start: 2025-04-16

## 2025-04-16 RX ORDER — TOPIRAMATE 50 MG/1
75 TABLET, FILM COATED ORAL 2 TIMES DAILY
Qty: 90 TABLET | Refills: 0 | Status: SHIPPED | OUTPATIENT
Start: 2025-04-16

## 2025-04-16 NOTE — NURSING NOTE
Chief Complaint   Patient presents with    Weight Management       Initial LMP 02/24/2025 (Approximate)  Estimated body mass index is 39.26 kg/m  as calculated from the following:    Height as of 12/24/24: 1.524 m (5').    Weight as of 12/24/24: 91.2 kg (201 lb).    Patient presents to the clinic using No DME    Is there anyone who you would like to be able to receive your results? No  If yes have patient fill out DANE

## 2025-04-16 NOTE — PATIENT INSTRUCTIONS
Class 2 obesity due to excess calories without serious comorbidity with body mass index (BMI) of 39.0 to 39.9 in adult  Chronic, slightly improved since dosage change last month.  Phentermine increased to 37.5 and topiramate increased to 50 mg twice a day.  Patient is tolerating well.  Does note a decrease in appetite and weight is slowly coming down approximately 6 pounds.  Discussed dosage adjustment the patient would like to increase topiramate.  Will increase to 75 mg twice a day and patient to continue to work on nutrition and exercise.  Follow-up in 1 month in person for routine preventative visit and follow-up.  - phentermine (ADIPEX-P) 37.5 MG capsule; Take 1 capsule (37.5 mg) by mouth every morning.  - topiramate (TOPAMAX) 50 MG tablet; Take 1.5 tablets (75 mg) by mouth 2 times daily.

## 2025-04-16 NOTE — PROGRESS NOTES
Sebastian is a 44 year old who is being evaluated via a billable video visit.    How would you like to obtain your AVS? MyChart  If the video visit is dropped, the invitation should be resent by: Text to cell phone: [phone]  Will anyone else be joining your video visit? No      Assessment & Plan     Class 2 obesity due to excess calories without serious comorbidity with body mass index (BMI) of 39.0 to 39.9 in adult  Chronic, slightly improved since dosage change last month.  Phentermine increased to 37.5 and topiramate increased to 50 mg twice a day.  Patient is tolerating well.  Does note a decrease in appetite and weight is slowly coming down approximately 6 pounds.  Discussed dosage adjustment the patient would like to increase topiramate.  Will increase to 75 mg twice a day and patient to continue to work on nutrition and exercise.  Follow-up in 1 month in person for routine preventative visit and follow-up.  - phentermine (ADIPEX-P) 37.5 MG capsule; Take 1 capsule (37.5 mg) by mouth every morning.  - topiramate (TOPAMAX) 50 MG tablet; Take 1.5 tablets (75 mg) by mouth 2 times daily.          BMI  Estimated body mass index is 39.26 kg/m  as calculated from the following:    Height as of 12/24/24: 1.524 m (5').    Weight as of 12/24/24: 91.2 kg (201 lb).   Weight management plan: Pharmacotherapy, and lifestyle management        Subjective   Sebastian is a 44 year old, presenting for the following health issues:  Weight Management        4/16/2025     4:38 PM   Additional Questions   [facility]     HPI      Weight Management  Wt Readings from Last 4 Encounters:   12/24/24 91.2 kg (201 lb)   11/18/24 91.2 kg (201 lb)   11/07/24 90.3 kg (199 lb)   05/01/24 94.8 kg (209 lb)     Still slow, but good  Weight this morning was 195; was down to 193  Definitely noting appetite decrease       Review of Systems  Constitutional, HEENT, cardiovascular, pulmonary, gi and gu systems are negative, except as otherwise  noted.      Objective    Vitals - Patient Reported  Weight (Patient Reported): 88.5 kg (195 lb)      Vitals:  No vitals were obtained today due to virtual visit.    Physical Exam   GENERAL: alert and no distress  EYES: Eyes grossly normal to inspection.  No discharge or erythema, or obvious scleral/conjunctival abnormalities.  RESP: No audible wheeze, cough, or visible cyanosis.    SKIN: Visible skin clear. No significant rash, abnormal pigmentation or lesions.  NEURO: Cranial nerves grossly intact.  Mentation and speech appropriate for age.  PSYCH: Appropriate affect, tone, and pace of words    Diagnostic Test Results:  none        Video-Visit Details    Type of service:  Video Visit   Originating Location (pt. Location): Home    Distant Location (provider location):  On-site  Platform used for Video Visit: Rosaura  Signed Electronically by: Purnima Biswas DNP      Chart documentation with Dragon Voice recognition Software. Although reviewed after completion, some words and grammatical errors may remain.

## 2025-05-12 ENCOUNTER — OFFICE VISIT (OUTPATIENT)
Dept: FAMILY MEDICINE | Facility: CLINIC | Age: 45
End: 2025-05-12
Payer: COMMERCIAL

## 2025-05-12 VITALS
RESPIRATION RATE: 20 BRPM | TEMPERATURE: 98.7 F | WEIGHT: 194.2 LBS | HEART RATE: 93 BPM | BODY MASS INDEX: 38.13 KG/M2 | HEIGHT: 60 IN | DIASTOLIC BLOOD PRESSURE: 80 MMHG | SYSTOLIC BLOOD PRESSURE: 124 MMHG | OXYGEN SATURATION: 98 %

## 2025-05-12 DIAGNOSIS — Z00.00 ROUTINE GENERAL MEDICAL EXAMINATION AT A HEALTH CARE FACILITY: Primary | ICD-10-CM

## 2025-05-12 DIAGNOSIS — E66.09 CLASS 2 OBESITY DUE TO EXCESS CALORIES WITHOUT SERIOUS COMORBIDITY WITH BODY MASS INDEX (BMI) OF 37.0 TO 37.9 IN ADULT: ICD-10-CM

## 2025-05-12 DIAGNOSIS — E66.812 CLASS 2 OBESITY DUE TO EXCESS CALORIES WITHOUT SERIOUS COMORBIDITY WITH BODY MASS INDEX (BMI) OF 37.0 TO 37.9 IN ADULT: ICD-10-CM

## 2025-05-12 DIAGNOSIS — Z12.31 ENCOUNTER FOR SCREENING MAMMOGRAM FOR BREAST CANCER: ICD-10-CM

## 2025-05-12 DIAGNOSIS — Z13.6 SCREENING FOR CARDIOVASCULAR CONDITION: ICD-10-CM

## 2025-05-12 LAB
ANION GAP SERPL CALCULATED.3IONS-SCNC: 10 MMOL/L (ref 7–15)
BUN SERPL-MCNC: 9.1 MG/DL (ref 6–20)
CALCIUM SERPL-MCNC: 9.2 MG/DL (ref 8.8–10.4)
CHLORIDE SERPL-SCNC: 107 MMOL/L (ref 98–107)
CHOLEST SERPL-MCNC: 194 MG/DL
CREAT SERPL-MCNC: 0.79 MG/DL (ref 0.51–0.95)
EGFRCR SERPLBLD CKD-EPI 2021: >90 ML/MIN/1.73M2
FASTING STATUS PATIENT QL REPORTED: NO
FASTING STATUS PATIENT QL REPORTED: NO
GLUCOSE SERPL-MCNC: 75 MG/DL (ref 70–99)
HCO3 SERPL-SCNC: 21 MMOL/L (ref 22–29)
HDLC SERPL-MCNC: 73 MG/DL
LDLC SERPL CALC-MCNC: 95 MG/DL
NONHDLC SERPL-MCNC: 121 MG/DL
POTASSIUM SERPL-SCNC: 4.1 MMOL/L (ref 3.4–5.3)
SODIUM SERPL-SCNC: 138 MMOL/L (ref 135–145)
TRIGL SERPL-MCNC: 128 MG/DL

## 2025-05-12 PROCEDURE — 3074F SYST BP LT 130 MM HG: CPT | Performed by: NURSE PRACTITIONER

## 2025-05-12 PROCEDURE — 36415 COLL VENOUS BLD VENIPUNCTURE: CPT | Performed by: NURSE PRACTITIONER

## 2025-05-12 PROCEDURE — 3079F DIAST BP 80-89 MM HG: CPT | Performed by: NURSE PRACTITIONER

## 2025-05-12 PROCEDURE — 99396 PREV VISIT EST AGE 40-64: CPT | Performed by: NURSE PRACTITIONER

## 2025-05-12 PROCEDURE — 1126F AMNT PAIN NOTED NONE PRSNT: CPT | Performed by: NURSE PRACTITIONER

## 2025-05-12 PROCEDURE — 99213 OFFICE O/P EST LOW 20 MIN: CPT | Mod: 25 | Performed by: NURSE PRACTITIONER

## 2025-05-12 PROCEDURE — 80061 LIPID PANEL: CPT | Performed by: NURSE PRACTITIONER

## 2025-05-12 PROCEDURE — 80048 BASIC METABOLIC PNL TOTAL CA: CPT | Performed by: NURSE PRACTITIONER

## 2025-05-12 RX ORDER — TOPIRAMATE 100 MG/1
100 TABLET, FILM COATED ORAL 2 TIMES DAILY
Qty: 180 TABLET | Refills: 1 | Status: SHIPPED | OUTPATIENT
Start: 2025-05-12

## 2025-05-12 SDOH — HEALTH STABILITY: PHYSICAL HEALTH: ON AVERAGE, HOW MANY DAYS PER WEEK DO YOU ENGAGE IN MODERATE TO STRENUOUS EXERCISE (LIKE A BRISK WALK)?: 2 DAYS

## 2025-05-12 SDOH — HEALTH STABILITY: PHYSICAL HEALTH: ON AVERAGE, HOW MANY MINUTES DO YOU ENGAGE IN EXERCISE AT THIS LEVEL?: 20 MIN

## 2025-05-12 ASSESSMENT — ANXIETY QUESTIONNAIRES
5. BEING SO RESTLESS THAT IT IS HARD TO SIT STILL: SEVERAL DAYS
1. FEELING NERVOUS, ANXIOUS, OR ON EDGE: MORE THAN HALF THE DAYS
IF YOU CHECKED OFF ANY PROBLEMS ON THIS QUESTIONNAIRE, HOW DIFFICULT HAVE THESE PROBLEMS MADE IT FOR YOU TO DO YOUR WORK, TAKE CARE OF THINGS AT HOME, OR GET ALONG WITH OTHER PEOPLE: SOMEWHAT DIFFICULT
3. WORRYING TOO MUCH ABOUT DIFFERENT THINGS: MORE THAN HALF THE DAYS
GAD7 TOTAL SCORE: 13
4. TROUBLE RELAXING: MORE THAN HALF THE DAYS
7. FEELING AFRAID AS IF SOMETHING AWFUL MIGHT HAPPEN: MORE THAN HALF THE DAYS
7. FEELING AFRAID AS IF SOMETHING AWFUL MIGHT HAPPEN: MORE THAN HALF THE DAYS
GAD7 TOTAL SCORE: 13
GAD7 TOTAL SCORE: 13
6. BECOMING EASILY ANNOYED OR IRRITABLE: MORE THAN HALF THE DAYS
2. NOT BEING ABLE TO STOP OR CONTROL WORRYING: MORE THAN HALF THE DAYS
8. IF YOU CHECKED OFF ANY PROBLEMS, HOW DIFFICULT HAVE THESE MADE IT FOR YOU TO DO YOUR WORK, TAKE CARE OF THINGS AT HOME, OR GET ALONG WITH OTHER PEOPLE?: SOMEWHAT DIFFICULT

## 2025-05-12 ASSESSMENT — PATIENT HEALTH QUESTIONNAIRE - PHQ9
10. IF YOU CHECKED OFF ANY PROBLEMS, HOW DIFFICULT HAVE THESE PROBLEMS MADE IT FOR YOU TO DO YOUR WORK, TAKE CARE OF THINGS AT HOME, OR GET ALONG WITH OTHER PEOPLE: NOT DIFFICULT AT ALL
SUM OF ALL RESPONSES TO PHQ QUESTIONS 1-9: 6
SUM OF ALL RESPONSES TO PHQ QUESTIONS 1-9: 6

## 2025-05-12 ASSESSMENT — PAIN SCALES - GENERAL: PAINLEVEL_OUTOF10: NO PAIN (0)

## 2025-05-12 ASSESSMENT — SOCIAL DETERMINANTS OF HEALTH (SDOH): HOW OFTEN DO YOU GET TOGETHER WITH FRIENDS OR RELATIVES?: NEVER

## 2025-05-12 NOTE — PATIENT INSTRUCTIONS
Class 2 obesity due to excess calories without serious comorbidity with body mass index (BMI) of 37.0 to 37.9 in adult  Chronic, stable.  Improving.  Taking phentermine approximately for 2 to 3 days due to dry mouth.  Tolerating 75 mg twice daily of topiramate.  Still improving appetite, weight is down.  Discussed increasing topiramate to 100 mg twice daily, patient is agreeable.  Continue to take phentermine every 2 to 3 days and topiramate.  Continue to encourage focusing in good nutrition and increasing daily physical activity.  Follow-up via virtual visit in approximately 3 months.  - topiramate (TOPAMAX) 100 MG tablet; Take 1 tablet (100 mg) by mouth 2 times daily.    Encounter for screening mammogram for breast cancer  Order placed.  - MA Screen Bilateral w/[facility]; Future    Screening for cardiovascular condition  Check cholesterol levels, patient not fasting.  - Lipid panel reflex to direct LDL Non-fasting; Future      Patient Education   Preventive Care Advice   This is general advice given by our system to help you stay healthy. However, your care team may have specific advice just for you. Please talk to your care team about your preventive care needs.  Nutrition  Eat 5 or more servings of fruits and vegetables each day.  Try wheat bread, brown rice and whole grain pasta (instead of white bread, rice, and pasta).  Get enough calcium and vitamin D. Check the label on foods and aim for 100% of the RDA (recommended daily allowance).  Lifestyle  Exercise at least 150 minutes each week  (30 minutes a day, 5 days a week).  Do muscle strengthening activities 2 days a week. These help control your weight and prevent disease.  No smoking.  Wear sunscreen to prevent skin cancer.  Have a dental exam and cleaning every 6 months.  Yearly exams  See your health care team every year to talk about:  Any changes in your health.  Any medicines your care team has prescribed.  Preventive care, family planning, and ways to  prevent chronic diseases.  Shots (vaccines)   HPV shots (up to age 26), if you've never had them before.  Hepatitis B shots (up to age 59), if you've never had them before.  COVID-19 shot: Get this shot when it's due.  Flu shot: Get a flu shot every year.  Tetanus shot: Get a tetanus shot every 10 years.  Pneumococcal, hepatitis A, and RSV shots: Ask your care team if you need these based on your risk.  Shingles shot (for age 50 and up)  General health tests  Diabetes screening:  Starting at age 35, Get screened for diabetes at least every 3 years.  If you are younger than age 35, ask your care team if you should be screened for diabetes.  Cholesterol test: At age 39, start having a cholesterol test every 5 years, or more often if advised.  Bone density scan (DEXA): At age 50, ask your care team if you should have this scan for osteoporosis (brittle bones).  Hepatitis C: Get tested at least once in your life.  STIs (sexually transmitted infections)  Before age 24: Ask your care team if you should be screened for STIs.  After age 24: Get screened for STIs if you're at risk. You are at risk for STIs (including HIV) if:  You are sexually active with more than one person.  You don't use condoms every time.  You or a partner was diagnosed with a sexually transmitted infection.  If you are at risk for HIV, ask about PrEP medicine to prevent HIV.  Get tested for HIV at least once in your life, whether you are at risk for HIV or not.  Cancer screening tests  Cervical cancer screening: If you have a cervix, begin getting regular cervical cancer screening tests starting at age 21.  Breast cancer scan (mammogram): If you've ever had breasts, begin having regular mammograms starting at age 40. This is a scan to check for breast cancer.  Colon cancer screening: It is important to start screening for colon cancer at age 45.  Have a colonoscopy test every 10 years (or more often if you're at risk) Or, ask your provider about stool  tests like a FIT test every year or Cologuard test every 3 years.  To learn more about your testing options, visit:   .  For help making a decision, visit:   https://bit.ly/zs12247.  Prostate cancer screening test: If you have a prostate, ask your care team if a prostate cancer screening test (PSA) at age 55 is right for you.  Lung cancer screening: If you are a current or former smoker ages 50 to 80, ask your care team if ongoing lung cancer screenings are right for you.  For informational purposes only. Not to replace the advice of your health care provider. Copyright   2023 [employer]. All rights reserved. Clinically reviewed by the [employer] Program. [employer] 537406 - REV 01/24.  Relationships for Good Health  Relationships are important for our health and happiness. Social isolation, loneliness and lack of support are bad for your health. Studies show that loneliness can harm health and limit your life span as much as high blood pressure and smoking.   Take some time to reflect on your relationships. Then answer these questions:  Are there people in your life that cause you stress or drain your energy? What can you do to set limits?  ________________________________________________________________________________________________________________________________________________________________________________________________________________________________________________________________________________________________________________________________________________  Who do you enjoy spending time with? Who can you go to for support?  ________________________________________________________________________________________________________________________________________________________________________________________________________________________________________________________________________________________________________________________________________________  What can  you do to improve your relationships with others?  __________________________________________________________________________________________________________________________________________________________________________________________________________________  ______________________________________________________________________________________________________________________________  What do you like most about your relationships with others?  ________________________________________________________________________________________________________________________________________________________________________________________________________________________________________________________________________________________________________________________________________________  My goal: ______________________________________________________________________  I will: ______________________________________________________________________________________________________________________________________________________________________________________________    For informational purposes only. Not to replace the advice of your health care provider. Copyright   2018 [facility]. All rights reserved. Clinically reviewed by [facility]  Team. [employer] 572836 - Rev 06/24.  Learning About Stress  What is stress?     Stress is your body's response to a hard situation. Your body can have a physical, emotional, or mental response. Stress is a fact of life for most people, and it affects everyone differently. What causes stress for you may not be stressful for someone else.  A lot of things can cause stress. You may feel stress when you go on a job interview, take a test, or run a race. This kind of short-term stress is normal and even useful. It can help you if you need to work hard or react quickly. For example, stress can help you finish an important job on time.  Long-term stress is caused by ongoing  stressful situations or events. Examples of long-term stress include long-term health problems, ongoing problems at work, or conflicts in your family. Long-term stress can harm your health.  How does stress affect your health?  When you are stressed, your body responds as though you are in danger. It makes hormones that speed up your heart, make you breathe faster, and give you a burst of energy. This is called the fight-or-flight stress response. If the stress is over quickly, your body goes back to normal and no harm is done.  But if stress happens too often or lasts too long, it can have bad effects. Long-term stress can make you more likely to get sick, and it can make symptoms of some diseases worse. If you tense up when you are stressed, you may develop neck, shoulder, or low back pain. Stress is linked to high blood pressure and heart disease.  Stress also harms your emotional health. It can make you martinez, tense, or depressed. Your relationships may suffer, and you may not do well at work or school.  What can you do to manage stress?  You can try these things to help manage stress:   Do something active. Exercise or activity can help reduce stress. Walking is a great way to get started. Even everyday activities such as housecleaning or yard work can help.  Try yoga or reina chi. These techniques combine exercise and meditation. You may need some training at first to learn them.  Do something you enjoy. For example, listen to music or go to a movie. Practice your hobby or do volunteer work.  Meditate. This can help you relax, because you are not worrying about what happened before or what may happen in the future.  Do guided imagery. Imagine yourself in any setting that helps you feel calm. You can use online videos, books, or a teacher to guide you.  Do breathing exercises. For example:  From a standing position, bend forward from the waist with your knees slightly bent. Let your arms dangle close to the  "floor.  Breathe in slowly and deeply as you return to a standing position. Roll up slowly and lift your head last.  Hold your breath for just a few seconds in the standing position.  Breathe out slowly and bend forward from the waist.  Let your feelings out. Talk, laugh, cry, and express anger when you need to. Talking with supportive friends or family, a counselor, or a simone leader about your feelings is a healthy way to relieve stress. Avoid discussing your feelings with people who make you feel worse.  Write. It may help to write about things that are bothering you. This helps you find out how much stress you feel and what is causing it. When you know this, you can find better ways to cope.  What can you do to prevent stress?  You might try some of these things to help prevent stress:  Manage your time. This helps you find time to do the things you want and need to do.  Get enough sleep. Your body recovers from the stresses of the day while you are sleeping.  Get support. Your family, friends, and community can make a difference in how you experience stress.  Limit your news feed. Avoid or limit time on social media or news that may make you feel stressed.  Do something active. Exercise or activity can help reduce stress. Walking is a great way to get started.  Where can you learn more?  Go to https://www.[employer].net/patiented  Enter N032 in the search box to learn more about \"Learning About Stress.\"  Current as of: October 24, 2024  Content Version: 14.4    6345-6610 [employer].   Care instructions adapted under license by your healthcare professional. If you have questions about a medical condition or this instruction, always ask your healthcare professional. [employer] disclaims any warranty or liability for your use of this information.    Learning About Depression Screening  What is depression screening?  Depression screening is a way to see if you have depression symptoms. It may " "be done by a doctor or counselor. It's often part of a routine checkup. That's because your mental health is just as important as your physical health.  Depression is a mental health condition that affects how you feel, think, and act. You may:  Have less energy.  Lose interest in your daily activities.  Feel sad and grouchy for a long time.  Depression is very common. It affects people of all ages.  Many things can lead to depression. Some people become depressed after they have a stroke or find out they have a major illness like cancer or heart disease. The death of a loved one or a breakup may lead to depression. It can run in families. Most experts believe that a combination of inherited genes and stressful life events can cause it.  What happens during screening?  You may be asked to fill out a form about your depression symptoms. You and the doctor will discuss your answers. The doctor may ask you more questions to learn more about how you think, act, and feel.  What happens after screening?  If you have symptoms of depression, your doctor will talk to you about your options.  Doctors usually treat depression with medicines or counseling. Often, combining the two works best. Many people don't get help because they think that they'll get over the depression on their own. But people with depression may not get better unless they get treatment.  The cause of depression is not well understood. There may be many factors involved. But if you have depression, it's not your fault.  A serious symptom of depression is thinking about death or suicide. If you or someone you care about talks about this or about feeling hopeless, get help right away.  It's important to know that depression can be treated. Medicine, counseling, and self-care may help.  Where can you learn more?  Go to https://www.healthwise.net/patiented  Enter T185 in the search box to learn more about \"Learning About Depression Screening.\"  Current as of: " July 31, 2024  Content Version: 14.4    7894-8748 [employer].   Care instructions adapted under license by your healthcare professional. If you have questions about a medical condition or this instruction, always ask your healthcare professional. [employer] disclaims any warranty or liability for your use of this information.

## 2025-05-12 NOTE — PROGRESS NOTES
Preventive Care Visit  [facility]  Purnima Biswas DNP, Family Medicine  May 12, 2025      Assessment & Plan     Routine general medical examination at a health care facility  Very pleasant, relatively healthy 44-year-old female in for annual preventative examination.  - Basic metabolic panel  (Ca, Cl, CO2, Creat, Gluc, K, Na, BUN); Future    Class 2 obesity due to excess calories without serious comorbidity with body mass index (BMI) of 37.0 to 37.9 in adult  Chronic, stable.  Improving.  Taking phentermine approximately for 2 to 3 days due to dry mouth.  Tolerating 75 mg twice daily of topiramate.  Still improving appetite, weight is down.  Discussed increasing topiramate to 100 mg twice daily, patient is agreeable.  Continue to take phentermine every 2 to 3 days and topiramate.  Continue to encourage focusing in good nutrition and increasing daily physical activity.  Follow-up via virtual visit in approximately 3 months.  - topiramate (TOPAMAX) 100 MG tablet; Take 1 tablet (100 mg) by mouth 2 times daily.    Encounter for screening mammogram for breast cancer  Order placed.  - MA Screen Bilateral w/[place]; Future    Screening for cardiovascular condition  Check cholesterol levels, patient not fasting.  - Lipid panel reflex to direct LDL Non-fasting; Future        Patient has been advised of split billing requirements and indicates understanding: Yes        Counseling  Appropriate preventive services were addressed with this patient via screening, questionnaire, or discussion as appropriate for fall prevention, nutrition, physical activity, Tobacco-use cessation, social engagement, weight loss and cognition.  Checklist reviewing preventive services available has been given to the patient.  Reviewed patient's diet, addressing concerns and/or questions.   She is at risk for lack of exercise and has been provided with information to increase physical activity for the benefit of her  well-being.   Patient is at risk for social isolation and has been provided with information about the benefit of social connection.   She is at risk for psychosocial distress and has been provided with information to reduce risk.   The patient's PHQ-9 score is consistent with mild depression. She was provided with information regarding depression.       Follow-up    Follow-up Visit   Expected date:  May 12, 2026 (Approximate)      Follow Up Appointment Details:     Follow-up with whom?: PCP    Follow-Up for what?: Adult Preventive    How?: In Person                 Chino Cortes is a 44 year old, presenting for the following:  Physical        5/12/2025     9:53 AM   Additional Questions   Roomed by Tiana LEE CMA          HPI  -Would like to discuss menstrual concerns. Has been having headaches during her period.  Sometimes her period starts early, and sometimes it lasts longer.       The last 2 months leading up to and during her cycle   Last month had dark spotting and then 2-3 days after had normal bleeding. Hasn't had it again.   Still getting her cycle at the same time within her pill pack however  Is noticing a difference being on the generic pill versus the brand name    Medication Followup of Phentermine and Topamax  Taking Medication as prescribed: NO-Taking Topamax as prescribed, but not taking Phentermine because it was causing dry mouth.  Side Effects:  see above  Medication Helping Symptoms:  yes    Taking Phentermine every 2-3 days   Feels both are still working   Eating well and exercising     Wt Readings from Last 3 Encounters:   05/12/25 88.1 kg (194 lb 3.2 oz)   12/24/24 91.2 kg (201 lb)   11/18/24 91.2 kg (201 lb)        Advance Care Planning    Discussed advance care planning with patient; informed AVS has link to [employer] [employer].        5/12/2025   General Health   How would you rate your overall physical health? Good   Feel stress (tense, anxious, or unable to sleep) Very much   (!) STRESS  CONCERN      5/12/2025   Nutrition   Three or more servings of calcium each day? (!) NO   Diet: Regular (no restrictions)   How many servings of fruit and vegetables per day? (!) 0-1   How many sweetened beverages each day? (!) 2         5/12/2025   Exercise   Days per week of moderate/strenous exercise 2 days   Average minutes spent exercising at this level 20 min   (!) EXERCISE CONCERN      5/12/2025   Social Factors   Frequency of gathering with friends or relatives Never   Worry food won't last until get money to buy more No   Food not last or not have enough money for food? No   Do you have housing? (Housing is defined as stable permanent housing and does not include staying outside in a car, in a tent, in an abandoned building, in an overnight shelter, or couch-surfing.) Yes   Are you worried about losing your housing? No   Lack of transportation? No   Unable to get utilities (heat,electricity)? No   (!) SOCIAL CONNECTIONS CONCERN      5/12/2025   Dental   Dentist two times every year? Yes       Today's PHQ-9 Score:       5/12/2025     9:35 AM   PHQ-9 SCORE   PHQ-9 Total Score MyChart 6 (Mild depression)   PHQ-9 Total Score 6        Patient-reported         5/12/2025   Substance Use   Alcohol more than 3/day or more than 7/wk No   Do you use any other substances recreationally? No     Social History     Tobacco Use    Smoking status: Never     Passive exposure: Never    Smokeless tobacco: Never   Vaping Use    Vaping status: Never Used   Substance Use Topics    Alcohol use: No     Comment: rare    Drug use: No           2/24/2024   LAST FHS-7 RESULTS   1st degree relative breast or ovarian cancer No   Any relative bilateral breast cancer No   Any male have breast cancer No   Any ONE woman have BOTH breast AND ovarian cancer No   Any woman with breast cancer before 50yrs No   2 or more relatives with breast AND/OR ovarian cancer No   2 or more relatives with breast AND/OR bowel cancer No        Mammogram  Screening - Mammogram every 1-2 years updated in Health Maintenance based on mutual decision making        2025   STI Screening   New sexual partner(s) since last STI/HIV test? No     History of abnormal Pap smear: No - age 30- 64 PAP with HPV every 5 years recommended        Latest Ref Rng & Units 2024     4:48 PM 2019     4:21 PM 2019     4:15 PM   PAP / HPV   PAP  Negative for Intraepithelial Lesion or Malignancy (NILM)      PAP (Historical)   NIL     HPV 16 DNA Negative Negative   Negative    HPV 18 DNA Negative Negative   Negative    Other HR HPV Negative Negative   Negative      ASCVD Risk   The ASCVD Risk score (Richard THOMPSON, et al., 2019) failed to calculate for the following reasons:    Cannot find a previous HDL lab    Cannot find a previous total cholesterol lab        2025   Contraception/Family Planning   Questions about contraception or family planning No        Reviewed and updated as needed this visit by Provider                    Past Medical History:   Diagnosis Date    Metrorrhagia 2013    Need for prophylactic hormone replacement therapy (postmenopausal)      Past Surgical History:   Procedure Laterality Date    ABDOMEN SURGERY  ,    c-sections    C/SECTION, LOW TRANSVERSE  2004    , Low Transverse    C/SECTION, LOW TRANSVERSE      CHOLECYSTECTOMY  2009    COLONOSCOPY  2013    Procedure: COLONOSCOPY;  Colonoscopy  ;  Surgeon: Jorge Underwood MD;  Location: [facility]    GALLBLADDER SURGERY       OB History    Para Term  AB Living   2 2 2 0 0 1   SAB IAB Ectopic Multiple Live Births   0 0 0 0 1      # Outcome Date GA Lbr Moshe/2nd Weight Sex Type Anes PTL Lv   2 Term 09 39w0d  3.232 kg (7 lb 2 oz) M CS-LTranv Spinal N FIONA      Name: Jett      Apgar1: 8  Apgar5: 9   1 Term 04    F CS-LTranv            Review of Systems  Constitutional, neuro, ENT, endocrine, pulmonary, cardiac,  gastrointestinal, genitourinary, musculoskeletal, integument and psychiatric systems are negative, except as otherwise noted.     Objective    Exam  /80 (BP Location: Right arm, Patient Position: Sitting, Cuff Size: Adult Large)   Pulse 93   Temp 98.7  F (37.1  C) (Tympanic)   Resp 20   Ht 1.524 m (5')   Wt 88.1 kg (194 lb 3.2 oz)   LMP 04/15/2025 (Exact Date)   SpO2 98%   BMI 37.93 kg/m     Estimated body mass index is 37.93 kg/m  as calculated from the following:    Height as of this encounter: 1.524 m (5').    Weight as of this encounter: 88.1 kg (194 lb 3.2 oz).    Physical Exam  GENERAL: alert and no distress  EYES: Eyes grossly normal to inspection, PERRL and conjunctivae and sclerae normal  HENT: ear canals and TM's normal, nose and mouth without ulcers or lesions  NECK: no adenopathy, no asymmetry, masses, or scars  RESP: lungs clear to auscultation - no rales, rhonchi or wheezes  BREAST: normal without masses, tenderness or nipple discharge and no palpable axillary masses or adenopathy  CV: regular rate and rhythm, normal S1 S2, no S3 or S4, no murmur, click or rub, no peripheral edema  ABDOMEN: soft, nontender, no hepatosplenomegaly, no masses and bowel sounds normal   (female) w/bimanual: normal female external genitalia, normal urethral meatus, normal vaginal mucosa, and normal cervix/adnexa/uterus without masses or discharge  MS: no gross musculoskeletal defects noted, no edema  SKIN: no suspicious lesions or rashes  NEURO: Normal strength and tone, mentation intact and speech normal  PSYCH: mentation appears normal, affect normal/bright        Signed Electronically by: Purnima Biswas DNP    Answers submitted by the patient for this visit:  Patient Health Questionnaire (Submitted on 5/12/2025)  If you checked off any problems, how difficult have these problems made it for you to do your work, take care of things at home, or get along with other people?: Not difficult at all  PHQ9 TOTAL  SCORE: 6  Patient Health Questionnaire (G7) (Submitted on 5/12/2025)  FIDENCIO 7 TOTAL SCORE: 13      Chart documentation with Dragon Voice recognition Software. Although reviewed after completion, some words and grammatical errors may remain.

## 2025-05-13 ENCOUNTER — RESULTS FOLLOW-UP (OUTPATIENT)
Dept: FAMILY MEDICINE | Facility: CLINIC | Age: 45
End: 2025-05-13

## 2025-06-11 ENCOUNTER — HOSPITAL ENCOUNTER (OUTPATIENT)
Dept: MAMMOGRAPHY | Facility: CLINIC | Age: 45
Discharge: HOME | End: 2025-06-11
Attending: NURSE PRACTITIONER
Payer: COMMERCIAL

## 2025-06-11 DIAGNOSIS — Z12.31 ENCOUNTER FOR SCREENING MAMMOGRAM FOR BREAST CANCER: ICD-10-CM

## 2025-06-11 PROCEDURE — 77063 BREAST TOMOSYNTHESIS BI: CPT
